# Patient Record
Sex: FEMALE | Race: OTHER | Employment: FULL TIME
[De-identification: names, ages, dates, MRNs, and addresses within clinical notes are randomized per-mention and may not be internally consistent; named-entity substitution may affect disease eponyms.]

---

## 2017-01-25 ENCOUNTER — TELEPHONE (OUTPATIENT)
Dept: OBGYN | Facility: CLINIC | Age: 32
End: 2017-01-25

## 2017-01-25 DIAGNOSIS — N92.6 MISSED PERIOD: Primary | ICD-10-CM

## 2017-02-16 ENCOUNTER — HOSPITAL ENCOUNTER (OUTPATIENT)
Age: 32
Discharge: HOME | End: 2017-02-16
Payer: COMMERCIAL

## 2017-02-16 DIAGNOSIS — N92.6 MISSED PERIOD: ICD-10-CM

## 2017-02-16 LAB — HCG QUANTITATIVE: <1 IU/L

## 2017-02-16 PROCEDURE — 84702 CHORIONIC GONADOTROPIN TEST: CPT

## 2017-02-16 PROCEDURE — 36415 COLL VENOUS BLD VENIPUNCTURE: CPT

## 2018-02-16 ENCOUNTER — OFFICE VISIT (OUTPATIENT)
Dept: OBGYN CLINIC | Age: 33
End: 2018-02-16
Payer: COMMERCIAL

## 2018-02-16 ENCOUNTER — HOSPITAL ENCOUNTER (OUTPATIENT)
Age: 33
Setting detail: SPECIMEN
Discharge: HOME | End: 2018-02-16
Payer: COMMERCIAL

## 2018-02-16 VITALS
HEART RATE: 86 BPM | HEIGHT: 65 IN | DIASTOLIC BLOOD PRESSURE: 70 MMHG | WEIGHT: 188 LBS | SYSTOLIC BLOOD PRESSURE: 106 MMHG | BODY MASS INDEX: 31.32 KG/M2

## 2018-02-16 DIAGNOSIS — Z11.51 SPECIAL SCREENING EXAMINATION FOR HUMAN PAPILLOMAVIRUS (HPV): ICD-10-CM

## 2018-02-16 DIAGNOSIS — N89.8 VAGINAL DISCHARGE: ICD-10-CM

## 2018-02-16 DIAGNOSIS — L91.8 SKIN TAG: ICD-10-CM

## 2018-02-16 DIAGNOSIS — Z01.419 WELL WOMAN EXAM: Primary | ICD-10-CM

## 2018-02-16 LAB
DIRECT EXAM: NORMAL
Lab: NORMAL
SPECIMEN DESCRIPTION: NORMAL
SPECIMEN DESCRIPTION: NORMAL
STATUS: NORMAL

## 2018-02-16 PROCEDURE — G0145 SCR C/V CYTO,THINLAYER,RESCR: HCPCS

## 2018-02-16 PROCEDURE — 87491 CHLMYD TRACH DNA AMP PROBE: CPT

## 2018-02-16 PROCEDURE — 99395 PREV VISIT EST AGE 18-39: CPT | Performed by: NURSE PRACTITIONER

## 2018-02-16 PROCEDURE — 87510 GARDNER VAG DNA DIR PROBE: CPT

## 2018-02-16 PROCEDURE — 87480 CANDIDA DNA DIR PROBE: CPT

## 2018-02-16 PROCEDURE — 87660 TRICHOMONAS VAGIN DIR PROBE: CPT

## 2018-02-16 PROCEDURE — 87591 N.GONORRHOEAE DNA AMP PROB: CPT

## 2018-02-16 PROCEDURE — 87624 HPV HI-RISK TYP POOLED RSLT: CPT

## 2018-02-16 ASSESSMENT — PATIENT HEALTH QUESTIONNAIRE - PHQ9
2. FEELING DOWN, DEPRESSED OR HOPELESS: 0
SUM OF ALL RESPONSES TO PHQ9 QUESTIONS 1 & 2: 0
SUM OF ALL RESPONSES TO PHQ QUESTIONS 1-9: 0
1. LITTLE INTEREST OR PLEASURE IN DOING THINGS: 0

## 2018-02-16 NOTE — PROGRESS NOTES
cancer- 45s? - unsure of what age)  Given information on Myrisk testing     If YES see scanned worksheet. Preventative Health Testing:    Health Maintenance:  Health Maintenance Due   Topic Date Due    DTaP/Tdap/Td vaccine (1 - Tdap) 08/23/2005    Cervical cancer screen  01/05/2017    Flu vaccine (1) 09/01/2017       Date of Last Pap Smear: 1/5/2016 ASCUS  Abnormal Pap Smear History: yes  Colposcopy History: colp 5/2016  Date of Last Mammogram: NA  Date of Last Colonoscopy:   Date of Last Bone Density:      ________________________________________________________________________        REVIEW OF SYSTEMS:    yes   A minimum of an eleven point review of systems was completed. Review Of Systems (11 point):  Constitutional: No fever, chills or malaise; No weight change or fatigue  Head and Eyes: No vision, Headache, Dizziness or trauma in last 12 months  ENT ROS: No hearing, Tinnitis, sinus or taste problems  Hematological and Lymphatic ROS:No Lymphoma, Von Willebrand's, Hemophillia or Bleeding History  Psych ROS: No Depression, Homicidal thoughts,suicidal thoughts, or anxiety  Breast ROS: No prior breast abnormalities or lumps  Respiratory ROS: No SOB, Pneumoniae,Cough, or Pulmonary Embolism History  Cardiovascular ROS: No Chest Pain with Exertion, Palpitations, Syncope, Edema, Arrhythmia  Gastrointestinal ROS: No Indigestion, Heartburn, Nausea, vomiting, Diarrhea, Constipation,or Bowel Changes; No Bloody Stools or melena  Genito-Urinary ROS: No Dysuria, Hematuria or Nocturia.  No Urinary Incontinence +Vaginal Discharge  Musculoskeletal ROS: No Arthralgia, Arthritis,Gout,Osteoporosis or Rheumatism  Neurological ROS: No CVA, Migraines, Epilepsy, Seizure Hx, or Limb Weakness  Dermatological ROS: No Rash, Itching, Hives, Mole Changes or Cancer PHYSICAL Exam:     Constitutional:  Vitals:    02/16/18 1055   BP: 106/70   Site: Right Arm   Position: Sitting   Cuff Size: Medium Adult   Pulse: 86   Weight: 188 lb (85.3 kg)   Height: 5' 5\" (1.651 m)         General Appearance: This  is a well Developed, well Nourished, well groomed female. Her BMI was reviewed. Nutritional decision making was discussed. Skin:  There was a Normal Inspection of the skin without rashes or lesions. There were no rashes. (Papular, Maculopapular, Hives, Pustular, Macular)     There were no lesions (Ulcers, Erythema, Abn. Appearing Nevi)            Lymphatic:  No Lymph Nodes were Palpable in the neck , axilla or groin.  0 # Of Lymph Nodes; Location ; Character [Normal]  [Shotty] [Tender] [Enlarged]     Neck and EENT:  The neck was supple. There were no masses   The thyroid was not enlarged and had no masses. Perrla, EOMI B/L, TMI B/L No Abnormalities. Throat inspected-No exudates or Masses, Nares Patent No Masses        Respiratory: The lungs were auscultated and found to be clear. There were no rales, rhonchi or wheezes. There was a good respiratory effort. Cardiovascular: The heart was in a regular rate and rhythm. . No S3 or S4. There was no murmur appreciated. Location, grade, and radiation are not applicable. Extremities: The patients extremities were without calf tenderness, edema, or varicosities. There was full range of motion in all four extremities. Pulses in all four extremities were appreciated and are 2/4. Abdomen: The abdomen was soft and non-tender. There were good bowel sounds in all quadrants and there was no guarding, rebound or rigidity. On evaluation there was no evidence of hepatosplenomegaly and there was no costal vertebral adeline tenderness bilaterally. No hernias were appreciated. Abdominal Scars: cholecystectomy scar    Psych:   The patient had a normal Orientation to: Time, begin every year at 37 yo if no abnormalities are found and no family     History. 3. Bone density studies every 2-3 years. Begin at 71 yo. If no fracture history or osteoporosis family history. (significant). 4. Colonoscopy begin at 47 yo. Repeat every ten years if negative and no family history. 5. Calcium of 4074-3448 mg/day in split dosing  6. Vitamin D 400-800 IU/day  7. All other preventative health recommendations will be managed by the patients Primary care physician. PLAN:  Return in about 4 weeks (around 3/16/2018) for DR Montoya/ skin tag removal.   Pap smear and vaginal cultures collected. Call for results. Repeat Annual every 1 year  Cervical Cytology Evaluation begins at 24years old. If Negative Cytology, Follow-up screening per current guidelines. Mammograms every 1 year. If 37 yo and last mammogram was negative. Calcium and Vitamin D dosing reviewed. Colonoscopy screening reviewed as well as onset for bone density testing. Birth control and barrier recommendations discussed. STD counseling and prevention reviewed. Gardisil counseling completed for all patients 7-35 yo. Routine health maintenance per patients PCP. Orders Placed This Encounter   Procedures    VAGINITIS DNA PROBE    C. Trachomatis / N. Gonorrhoeae, DNA     Standing Status:   Future     Standing Expiration Date:   2/17/2019    PAP SMEAR     Patient History:    No LMP recorded. Patient has had an injection. OBGYN Status: Injection  Past Surgical History:  No date: CHOLECYSTECTOMY    Problem List       Edg Problems Affecting Cytology    HPV in female     Smoking status: Never Smoker                                                              Smokeless tobacco: Never Used                           Standing Status:   Future     Standing Expiration Date:   2/16/2019     Order Specific Question:   Collection Type     Answer: Thin Prep     Order Specific Question:   Prior Abnormal Pap Test     Answer:    No

## 2018-02-19 LAB
C TRACH DNA GENITAL QL NAA+PROBE: NEGATIVE
HPV SAMPLE: ABNORMAL
HPV SOURCE: ABNORMAL
HPV, GENOTYPE 16: NOT DETECTED
HPV, GENOTYPE 18: DETECTED
HPV, HIGH RISK OTHER: DETECTED
HPV, INTERPRETATION: ABNORMAL
N. GONORRHOEAE DNA: NEGATIVE

## 2018-02-23 LAB — CYTOLOGY REPORT: NORMAL

## 2018-02-28 ENCOUNTER — TELEPHONE (OUTPATIENT)
Dept: OBGYN CLINIC | Age: 33
End: 2018-02-28

## 2018-02-28 RX ORDER — FLUCONAZOLE 150 MG/1
150 TABLET ORAL ONCE
Qty: 1 TABLET | Refills: 0 | Status: SHIPPED | OUTPATIENT
Start: 2018-02-28 | End: 2018-02-28

## 2018-04-16 ENCOUNTER — TELEPHONE (OUTPATIENT)
Dept: OBGYN CLINIC | Age: 33
End: 2018-04-16

## 2018-04-16 DIAGNOSIS — N92.6 MISSED MENSES: Primary | ICD-10-CM

## 2018-07-09 ENCOUNTER — OFFICE VISIT (OUTPATIENT)
Dept: PODIATRY | Age: 33
End: 2018-07-09
Payer: COMMERCIAL

## 2018-07-09 VITALS — HEIGHT: 65 IN | WEIGHT: 193 LBS | BODY MASS INDEX: 32.15 KG/M2

## 2018-07-09 DIAGNOSIS — M72.2 PLANTAR FASCIAL FIBROMATOSIS: Primary | ICD-10-CM

## 2018-07-09 DIAGNOSIS — M79.671 PAIN IN RIGHT FOOT: ICD-10-CM

## 2018-07-09 PROCEDURE — 99203 OFFICE O/P NEW LOW 30 MIN: CPT | Performed by: PODIATRIST

## 2018-07-09 NOTE — PROGRESS NOTES
Bilateral    Neurological: Sensation intact to light touch to level of digits, Bilateral.  Protective sensation intact 10/10 sites via 5.07/10g [place]-Vasyl Monofilament, Bilateral.  negative Tinel's, Bilateral.  negative Valleix sign, Bilateral.      Integument: Warm, dry, supple, Bilateral.  Open lesion absent, Bilateral.  Interdigital maceration absent to web spaces 1-4, Bilateral.  Nails are normal in length, thickness and color 1-5 bilateral.  Fissures absent, Bilateral.         Asessment: Patient is a 33 y.o. female with:   Diagnosis Orders   1. Plantar fascial fibromatosis  MRI Ankle Right WO Contrast   2. Pain in right foot  MRI Ankle Right WO Contrast       Plan: Patient examined and evaluated. Current condition and treatment options discussed in detail. Advised pt to her condtion. since we have tried NSAID, immobilization,  RICE therapy physical therapy and the symptoms have not improved we will need to order an MRI of the affected limb to assess the area as she may have a tear in the ligament. .  Verbal and written instructions given to patient. Orders:   Orders Placed This Encounter   Procedures    MRI Ankle Right WO Contrast     Standing Status:   Future     Standing Expiration Date:   7/10/2019      Contact office with any questions/problems/concerns.   RTC in 1week(s) after MRI.    7/9/2018    Electronically signed by Kannan Freedman DPM on 7/9/2018 at 4:26 PM  7/9/2018

## 2018-07-14 ENCOUNTER — HOSPITAL ENCOUNTER (OUTPATIENT)
Dept: MRI IMAGING | Age: 33
Discharge: HOME | End: 2018-07-16
Payer: COMMERCIAL

## 2018-07-14 DIAGNOSIS — M79.671 PAIN IN RIGHT FOOT: ICD-10-CM

## 2018-07-14 DIAGNOSIS — M72.2 PLANTAR FASCIAL FIBROMATOSIS: ICD-10-CM

## 2018-07-14 PROCEDURE — 73721 MRI JNT OF LWR EXTRE W/O DYE: CPT

## 2018-07-16 ENCOUNTER — OFFICE VISIT (OUTPATIENT)
Dept: PODIATRY | Age: 33
End: 2018-07-16
Payer: COMMERCIAL

## 2018-07-16 VITALS — HEIGHT: 65 IN | WEIGHT: 193 LBS | BODY MASS INDEX: 32.15 KG/M2

## 2018-07-16 DIAGNOSIS — M79.671 PAIN IN BOTH FEET: ICD-10-CM

## 2018-07-16 DIAGNOSIS — M72.2 PLANTAR FASCIAL FIBROMATOSIS: Primary | ICD-10-CM

## 2018-07-16 DIAGNOSIS — M79.672 PAIN IN BOTH FEET: ICD-10-CM

## 2018-07-16 PROBLEM — F10.10 ALCOHOL ABUSE: Status: ACTIVE | Noted: 2017-02-08

## 2018-07-16 PROBLEM — K85.90 ACUTE PANCREATITIS: Status: ACTIVE | Noted: 2017-08-22

## 2018-07-16 PROBLEM — N91.2 AMENORRHEA: Status: ACTIVE | Noted: 2017-03-22

## 2018-07-16 PROBLEM — K80.10 CHOLELITHIASIS AND CHOLECYSTITIS WITHOUT OBSTRUCTION: Status: ACTIVE | Noted: 2017-08-22

## 2018-07-16 PROBLEM — B35.4 TINEA CORPORIS: Status: ACTIVE | Noted: 2017-02-08

## 2018-07-16 PROBLEM — R51.9 HEADACHE: Status: ACTIVE | Noted: 2018-07-16

## 2018-07-16 PROBLEM — J01.90 ACUTE SINUSITIS: Status: ACTIVE | Noted: 2018-07-16

## 2018-07-16 PROBLEM — N92.6 IRREGULAR PERIODS: Status: ACTIVE | Noted: 2018-07-16

## 2018-07-16 PROBLEM — F41.8 MIXED ANXIETY AND DEPRESSIVE DISORDER: Status: ACTIVE | Noted: 2018-07-16

## 2018-07-16 PROCEDURE — 99213 OFFICE O/P EST LOW 20 MIN: CPT | Performed by: PODIATRIST

## 2018-07-16 RX ORDER — CITALOPRAM 10 MG/1
TABLET ORAL
COMMUNITY
End: 2018-08-10

## 2018-07-16 RX ORDER — KETOROLAC TROMETHAMINE 30 MG/ML
INJECTION, SOLUTION INTRAMUSCULAR; INTRAVENOUS
COMMUNITY
End: 2018-08-10

## 2018-07-16 RX ORDER — NAPROXEN 500 MG/1
TABLET ORAL
COMMUNITY
End: 2018-08-10

## 2018-07-16 RX ORDER — METRONIDAZOLE 500 MG/1
TABLET ORAL
COMMUNITY
End: 2018-08-10 | Stop reason: ALTCHOICE

## 2018-07-16 NOTE — LETTER
[address]  [address]  Phone: [phone]    Iam Nelson        July 16, 7216     Patient: Conner Aviles   YOB: 1985   Date of Visit: 7/16/2018       To Whom it May Concern:    Conner Aviles was seen in my clinic on 7/16/2018. She may return to work on tuesday july 17 2018. Please allow patient to work a full shift with 4 hours light duty and 4 hours of regular work duty not to exceed 8 hours per shift. Please allow patient to ice her foot for 15 minutes when needed for foot pain. If you have any questions or concerns, please don't hesitate to call.     Sincerely,         Maria De Jesus Miller DPM

## 2018-08-10 ENCOUNTER — HOSPITAL ENCOUNTER (OUTPATIENT)
Dept: PREADMISSION TESTING | Age: 33
Discharge: HOME | End: 2018-08-14
Payer: COMMERCIAL

## 2018-08-10 VITALS
WEIGHT: 201.5 LBS | SYSTOLIC BLOOD PRESSURE: 116 MMHG | RESPIRATION RATE: 16 BRPM | OXYGEN SATURATION: 98 % | HEART RATE: 89 BPM | BODY MASS INDEX: 33.57 KG/M2 | DIASTOLIC BLOOD PRESSURE: 88 MMHG | HEIGHT: 65 IN

## 2018-08-10 LAB
HCT VFR BLD CALC: 39.8 % (ref 36–46)
HEMOGLOBIN: 12.9 G/DL (ref 12–16)
MCH RBC QN AUTO: 26.5 PG (ref 26–34)
MCHC RBC AUTO-ENTMCNC: 32.5 G/DL (ref 31–37)
MCV RBC AUTO: 81.7 FL (ref 80–100)
NRBC AUTOMATED: ABNORMAL PER 100 WBC
PDW BLD-RTO: 13.5 % (ref 11.5–14.5)
PLATELET # BLD: 368 K/UL (ref 130–400)
PMV BLD AUTO: 9 FL (ref 6–12)
RBC # BLD: 4.88 M/UL (ref 4–5.2)
WBC # BLD: 13.5 K/UL (ref 3.5–11)

## 2018-08-10 PROCEDURE — 85027 COMPLETE CBC AUTOMATED: CPT

## 2018-08-10 PROCEDURE — 36415 COLL VENOUS BLD VENIPUNCTURE: CPT

## 2018-08-10 RX ORDER — PREDNISONE 20 MG/1
20 TABLET ORAL 2 TIMES DAILY
Status: ON HOLD | COMMUNITY
End: 2018-09-07

## 2018-08-10 RX ORDER — AMOXICILLIN AND CLAVULANATE POTASSIUM 875; 125 MG/1; MG/1
1 TABLET, FILM COATED ORAL 2 TIMES DAILY
Status: ON HOLD | COMMUNITY
End: 2018-09-07

## 2018-08-10 ASSESSMENT — PAIN DESCRIPTION - ORIENTATION: ORIENTATION: RIGHT

## 2018-08-10 ASSESSMENT — PAIN SCALES - GENERAL: PAINLEVEL_OUTOF10: 7

## 2018-08-10 ASSESSMENT — PAIN DESCRIPTION - DESCRIPTORS: DESCRIPTORS: SHARP

## 2018-08-10 ASSESSMENT — PAIN DESCRIPTION - PAIN TYPE: TYPE: ACUTE PAIN

## 2018-08-10 ASSESSMENT — PAIN DESCRIPTION - LOCATION: LOCATION: FOOT

## 2018-08-10 NOTE — H&P
History and Physical Service   [facility]    HISTORY AND PHYSICAL EXAMINATION            Date of Evaluation: 8/10/2018  Patient name:  Abiel Arthur  MRN:   [id number]  YOB: 1985  PCP:    Alia Michaels M.D., MD    History Obtained From:     Patient    History of Present Illness: This is Abiel Arthur a 33 y.o. female who presents for a pre-admission testing appointment for an upcoming right foot plantar fasciotomy and PRP injection by Dr. Randi Puente scheduled on 09- at 0900 due to right plantar fasciitis. The  patient's chief complaint is intermittent, 5-10/10 right foot pain which has progressively worsened over the past couple months. Right foot pain is aggravated by walking; and is minimally relieved with ice, elevation, and Naproxen. Pt states she has occasional right hallux numbness and occasional right foot swelling. Denies right foot tingling and weakness. Prior treatment includes a foot boot which is worn at night and right foot steroid injections x 3 by Dr. Ariadne West from podiatry. Denies recent falls and injury. Patient presents for surgical correction. Pt started taking Augmentin on 08- due to tonsillitis. Augmentin was prescribed for 10 days by Dr. Ritchie Grimaldo per pt. Since tonsillitis symptoms and sore throat had not improved by 08-, Dr. Ritchie Grimaldo started the pt on Prednisone 20 mg twice daily for 7 days. Pt denies current sore throat and fever. Pt states she has sinus congestion, occasional snoring, halitosis, and occasional dysphagia. Pt is taking Claritin for sinus congestion. Pt has an appointment with ENT on 08-.     Past Medical History:     Past Medical History:   Diagnosis Date    Alcohol abuse 2/8/2017    Atypical squamous cell changes of undetermined significance (ASCUS) on cervical cytology with positive high risk human papilloma virus (HPV) 1/5/16    Chlamydia 10/9/12    Depressive disorder 06/06/2013    is doing well now, no longer on medication    History of anxiety     HPV in female 2016    Seasonal allergies     Strep throat     Tonsillitis 2018        Past Surgical History:     Past Surgical History:   Procedure Laterality Date    CHOLECYSTECTOMY          Medications Prior to Admission:     Prior to Admission medications    Medication Sig Start Date End Date Taking? Authorizing Provider   NAPROXEN PO Take 200 mg by mouth 2 times daily as needed   Yes Historical Provider, MD   amoxicillin-clavulanate (AUGMENTIN) 875-125 MG per tablet Take 1 tablet by mouth 2 times daily   Yes Historical Provider, MD   predniSONE (DELTASONE) 20 MG tablet Take 20 mg by mouth 2 times daily   Yes Historical Provider, MD   loratadine-pseudoephedrine (CLARITIN-D 12 HOUR) 5-120 MG per extended release tablet Claritin-D 12 Hour 5 mg-120 mg tablet,extended release   Take 1 tablet every 12 hours by oral route. Yes Historical Provider, MD        Allergies:     No known allergies    Social History:     Tobacco:    reports that she has never smoked. She has never used smokeless tobacco.  Alcohol:      reports that she drinks alcohol. Drug Use:  reports that she does not use drugs. Functional Capacity:   1) Pt is able to walk 2 blocks on level ground without stopping due to right foot pain. 2) Pt is able to climb 1 flight of stairs or more without stopping. 3) Pt is able to walk up a hill 1-2 blocks.     Family History:     Family History   Problem Relation Age of Onset    Other Paternal Grandfather         sepsis    Diabetes Paternal Grandfather     Kidney Disease Paternal Grandfather         had transplant that caused sepsis    Cancer Paternal Grandmother         unsure of kind    Hypertension Paternal Grandmother     Hypertension Father     No Known Problems Mother     Breast Cancer Neg Hx     Colon Cancer Neg Hx     Eclampsia Neg Hx     Ovarian Cancer Neg Hx      Labor Neg Hx     Spont Abortions Neg Hx     Stroke Neg Hx  Heart Attack Neg Hx        Review of Systems:     Positive and Negative as described in HPI. CONSTITUTIONAL: Negative for fevers, chills, sweats, fatigue, and weight loss. HEENT: Sinus congestion. Occasional dysphagia. Seasonal allergies. Snoring. Halitosis. Negative for glasses, hearing changes, runny nose, and throat pain. RESPIRATORY:  Denies asthma, COPD, and sleep apnea. Negative for shortness of breath, cough, congestion, and wheezing. CARDIOVASCULAR:  Negative for chest pain, blood clot, irregular heart beat, and palpitations. GASTROINTESTINAL:  Negative for reflux, nausea, vomiting, diarrhea, constipation, change in bowel habits, and abdominal pain. GENITOURINARY: Negative for difficulty of urination, burning with urination, and frequency. INTEGUMENT:  Negative for rash, skin lesions, and easy bruising. HEMATOLOGIC/LYMPHATIC:  Negative for swelling/edema. ALLERGIC/IMMUNOLOGIC:  Negative for urticaria and itching. ENDOCRINE:  Negative for diabetes, increase in drinking, increase in urination, and heat or cold intolerance. MUSCULOSKELETAL: Right foot pain and occasional swelling. Negative muscle aches. NEUROLOGICAL: Right hallux numbness. Negative for headaches, dizziness, lightheadedness, and tingling extremities. BEHAVIOR/PSYCH: Depression. Negative for anxiety. Physical Exam:   /88   Pulse 89   Resp 16   Ht 5' 5\" (1.651 m)   Wt 201 lb 8 oz (91.4 kg)   LMP 07/14/2018   SpO2 98%   BMI 33.53 kg/m²   Patient's last menstrual period was 07/14/2018. [id number]  No results for input(s): POCGLU in the last 72 hours. General Appearance:  Alert, well appearing, and in no acute distress. Obese. Mental status:  Oriented to person, place, and time. Head:  Normocephalic and atraumatic. Eye:  No icterus, redness, pupils equal and reactive, extraocular eye movements intact, and conjunctiva clear. Ear:  Hearing grossly intact. Nose:  No drainage noted.   Mouth:  Airway is calcaneofibular and posterior talofibular ligaments are intact. DELTOID LIGAMENT COMPLEX: The deep fibers of the deltoid ligament are intact. SINUS TARSI AND SPRING LIGAMENT: The sinus tarsi fat is preserved. MEDIAL TENDONS: The tibialis posterior, flexor digitorum longus and flexor hallucis longus tendons are intact. There is minimal thickening of the distal tibialis posterior tendon. LATERAL TENDONS: Peroneal tendons are located and intact. There is a trace amount of peroneal tendon sheath fluid. ANTERIOR TENDONS: The anterior tendons are intact. ACHILLES TENDON: The Achilles tendon is intact. PLANTAR FASCIA: There is intense edema associated with the plantar fascial origins. This is most pronounced at the central and lateral cord origins. There is a small plantar calcaneal heel spur with mild edema. There is linear fluid signal intensity within the central cord origin consistent with small interstitial tearing. There is no complete tear or distraction. TARSAL TUNNEL: There are no obstructing lesions in the location of the tarsal tunnel. BONE MARROW: There is articular surface irregularity about the medial naviculocuneiform articulation with associated edema and cystic change. There is intense edema within the mid to distal aspect of the central cuneiform. There is no diffuse marrow replacing process. JOINT SPACES: The Lisfranc ligament is intact. Midfoot alignment is within normal limits. There is no osteochondral lesion. 1. Moderate to severe acute plantar fasciitis most pronounced involving the central and lateral cords. Small linear interstitial tearing at the central cord origin. 2. Articular surface irregularity and subchondral change involving the medial naviculocuneiform articulation. 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 3. Minimal peroneal tenosynovitis. Diagnosis:      1.  Right plantar fasciitis    Plans: 1.  Right foot plantar fasciotomy and PRP injection      Gustavo Del Rosario, APRN - CNP  8/10/2018  3:22 PM

## 2018-08-10 NOTE — PRE-PROCEDURE INSTRUCTIONS
[facility] ON Friday, 9/7/2018 at 7:30 AM    Once you enter the hospital lobby, take the elevators to the second floor. Check-In is at the surgery registration desk. If you have been given a blood band, you must bring it with you the day of surgery. Continue to take your home medications as you normally do up to and including the night before surgery with the exception of any blood thinning medications. Please stop any blood thinning medications as directed by your surgeon or prescribing physician. Failure to stop certain medications may interfere with your scheduled surgery. These may include:  Aspirin, Warfarin (Coumadin), Clopidogrel (Plavix), Ibuprofen (Motrin, Advil), Naproxen (Aleve), Meloxicam (Mobic), Celecoxib (Celebrex), Eliquis, Pradaxa, Xarelto, Effient, Fish Oil, Herbal supplements. Naproxen    If you are diabetic, do not take any of your diabetic medications by mouth the morning of surgery. If you are taking insulin contact the doctor that manages your diabetes for instructions about any changes to your insulin dosages the day before surgery. Do not inject insulin or other injectable diabetic medications the morning of surgery unless otherwise instructed by the doctor who manages your diabetes. Please take the following medication(s) the day of surgery with a small sip of water:  none    Please use your inhalers at home the day of surgery. PREPARING FOR YOUR SURGERY:     Before surgery, you can play an important role in your own health. Because skin is not sterile, we need to be sure that your skin is as free of germs as possible before surgery by carefully washing before surgery. Preparing or prepping skin before surgery can reduce the risk of a surgical site infection.   Do not shave the area of your body where your surgery will be performed unless you received specific permission from your physician.     You will need to shower at home the night before to surgery.  Brush your teeth but do not swallow water.  Bring your eyeglasses and case with you. No contacts are to be worn the day of surgery. You also may bring your hearing aids.  If you are on C-PAP or Bi-PAP at home and plan on staying in the hospital overnight for your surgery please bring the machine with you. · Do not wear any jewelry or body piercings day of surgery. Also, NO lotion, perfume or deodorant to be used the day of surgery. No nail polish on the operative extremity (arm/leg surgeries)    · If you are staying overnight with us, please bring a small bag of necessary personal items.  Please wear loose, comfortable clothing. If you are potentially going to have a cast or brace bring clothing that will fit over them.  In case of illness - If you have cold or flu like symptoms (high fever, runny nose, sore throat, cough, etc.) rash, nausea, vomiting, loose stools, and/or recent contact with someone who has a contagious disease (chicken pox, measles, etc.) Please call your doctor before coming to the hospital.     If your child is having surgery please make arrangements for any other children to be cared for at home on the day of surgery. Other children are not permitted in recovery room and we want you to be able to spend time with the patient. If other arrangements are not available then we suggest that you have a second adult to stay in the waiting room. Day of Surgery/Procedure:    As a patient at [facility] you can expect quality medical and nursing care that is centered on your individual needs. Our goal is to make your surgical experience as comfortable as possible    . Transportation After Your Surgery/Procedure: You will need a friend or family member to drive you home after your procedure.   Your  must be 25years of age or

## 2018-09-04 NOTE — PROGRESS NOTES
Called patient regarding her recent tonsillitis and scheduled appointment with ENT 8/24/2018. Pt states she had to reschedule her appointment and has not gone yet. States she finished her antibiotics and steroids and she hasn't had any problems since.

## 2018-09-06 ENCOUNTER — ANESTHESIA EVENT (OUTPATIENT)
Dept: OPERATING ROOM | Age: 33
End: 2018-09-06
Payer: COMMERCIAL

## 2018-09-07 ENCOUNTER — HOSPITAL ENCOUNTER (OUTPATIENT)
Age: 33
Setting detail: OUTPATIENT SURGERY
Discharge: HOME | End: 2018-09-07
Attending: PODIATRIST | Admitting: PODIATRIST
Payer: COMMERCIAL

## 2018-09-07 ENCOUNTER — ANESTHESIA (OUTPATIENT)
Dept: OPERATING ROOM | Age: 33
End: 2018-09-07
Payer: COMMERCIAL

## 2018-09-07 VITALS
HEIGHT: 65 IN | TEMPERATURE: 96.8 F | SYSTOLIC BLOOD PRESSURE: 126 MMHG | OXYGEN SATURATION: 100 % | HEART RATE: 67 BPM | WEIGHT: 201.5 LBS | DIASTOLIC BLOOD PRESSURE: 77 MMHG | RESPIRATION RATE: 19 BRPM | BODY MASS INDEX: 33.57 KG/M2

## 2018-09-07 VITALS — OXYGEN SATURATION: 100 % | SYSTOLIC BLOOD PRESSURE: 117 MMHG | DIASTOLIC BLOOD PRESSURE: 73 MMHG | TEMPERATURE: 97 F

## 2018-09-07 DIAGNOSIS — Z98.890 STATUS POST RIGHT FOOT SURGERY: Primary | ICD-10-CM

## 2018-09-07 DIAGNOSIS — G89.18 ACUTE POST-OPERATIVE PAIN: ICD-10-CM

## 2018-09-07 LAB — HCG, PREGNANCY URINE (POC): NEGATIVE

## 2018-09-07 PROCEDURE — 6360000002 HC RX W HCPCS: Performed by: ANESTHESIOLOGY

## 2018-09-07 PROCEDURE — 7100000011 HC PHASE II RECOVERY - ADDTL 15 MIN: Performed by: PODIATRIST

## 2018-09-07 PROCEDURE — 84703 CHORIONIC GONADOTROPIN ASSAY: CPT

## 2018-09-07 PROCEDURE — 28062 REMOVAL OF FOOT FASCIA: CPT | Performed by: PODIATRIST

## 2018-09-07 PROCEDURE — 6360000002 HC RX W HCPCS: Performed by: PODIATRIST

## 2018-09-07 PROCEDURE — 3700000000 HC ANESTHESIA ATTENDED CARE: Performed by: PODIATRIST

## 2018-09-07 PROCEDURE — 6370000000 HC RX 637 (ALT 250 FOR IP): Performed by: ANESTHESIOLOGY

## 2018-09-07 PROCEDURE — C9290 INJ, BUPIVACAINE LIPOSOME: HCPCS | Performed by: PODIATRIST

## 2018-09-07 PROCEDURE — 7100000000 HC PACU RECOVERY - FIRST 15 MIN: Performed by: PODIATRIST

## 2018-09-07 PROCEDURE — 2580000003 HC RX 258: Performed by: PODIATRIST

## 2018-09-07 PROCEDURE — 6360000002 HC RX W HCPCS: Performed by: NURSE ANESTHETIST, CERTIFIED REGISTERED

## 2018-09-07 PROCEDURE — 2580000003 HC RX 258: Performed by: ANESTHESIOLOGY

## 2018-09-07 PROCEDURE — 2709999900 HC NON-CHARGEABLE SUPPLY: Performed by: PODIATRIST

## 2018-09-07 PROCEDURE — 3600000012 HC SURGERY LEVEL 2 ADDTL 15MIN: Performed by: PODIATRIST

## 2018-09-07 PROCEDURE — 7100000010 HC PHASE II RECOVERY - FIRST 15 MIN: Performed by: PODIATRIST

## 2018-09-07 PROCEDURE — 2500000003 HC RX 250 WO HCPCS: Performed by: NURSE ANESTHETIST, CERTIFIED REGISTERED

## 2018-09-07 PROCEDURE — 3700000001 HC ADD 15 MINUTES (ANESTHESIA): Performed by: PODIATRIST

## 2018-09-07 PROCEDURE — 2580000003 HC RX 258: Performed by: NURSE ANESTHETIST, CERTIFIED REGISTERED

## 2018-09-07 PROCEDURE — 7100000001 HC PACU RECOVERY - ADDTL 15 MIN: Performed by: PODIATRIST

## 2018-09-07 PROCEDURE — 3600000002 HC SURGERY LEVEL 2 BASE: Performed by: PODIATRIST

## 2018-09-07 RX ORDER — MIDAZOLAM HYDROCHLORIDE 1 MG/ML
INJECTION INTRAMUSCULAR; INTRAVENOUS PRN
Status: DISCONTINUED | OUTPATIENT
Start: 2018-09-07 | End: 2018-09-07 | Stop reason: SDUPTHER

## 2018-09-07 RX ORDER — FENTANYL CITRATE 50 UG/ML
INJECTION, SOLUTION INTRAMUSCULAR; INTRAVENOUS PRN
Status: DISCONTINUED | OUTPATIENT
Start: 2018-09-07 | End: 2018-09-07 | Stop reason: SDUPTHER

## 2018-09-07 RX ORDER — SODIUM CHLORIDE, SODIUM LACTATE, POTASSIUM CHLORIDE, CALCIUM CHLORIDE 600; 310; 30; 20 MG/100ML; MG/100ML; MG/100ML; MG/100ML
INJECTION, SOLUTION INTRAVENOUS CONTINUOUS PRN
Status: DISCONTINUED | OUTPATIENT
Start: 2018-09-07 | End: 2018-09-07 | Stop reason: SDUPTHER

## 2018-09-07 RX ORDER — ONDANSETRON 2 MG/ML
INJECTION INTRAMUSCULAR; INTRAVENOUS PRN
Status: DISCONTINUED | OUTPATIENT
Start: 2018-09-07 | End: 2018-09-07 | Stop reason: SDUPTHER

## 2018-09-07 RX ORDER — CEPHALEXIN 500 MG/1
500 CAPSULE ORAL 3 TIMES DAILY
Qty: 21 CAPSULE | Refills: 0 | Status: SHIPPED | OUTPATIENT
Start: 2018-09-07 | End: 2018-09-14

## 2018-09-07 RX ORDER — SODIUM CHLORIDE, SODIUM LACTATE, POTASSIUM CHLORIDE, CALCIUM CHLORIDE 600; 310; 30; 20 MG/100ML; MG/100ML; MG/100ML; MG/100ML
INJECTION, SOLUTION INTRAVENOUS CONTINUOUS
Status: DISCONTINUED | OUTPATIENT
Start: 2018-09-08 | End: 2018-09-07 | Stop reason: HOSPADM

## 2018-09-07 RX ORDER — LABETALOL HYDROCHLORIDE 5 MG/ML
5 INJECTION, SOLUTION INTRAVENOUS EVERY 10 MIN PRN
Status: DISCONTINUED | OUTPATIENT
Start: 2018-09-07 | End: 2018-09-07 | Stop reason: HOSPADM

## 2018-09-07 RX ORDER — HYDROMORPHONE HCL 110MG/55ML
0.5 PATIENT CONTROLLED ANALGESIA SYRINGE INTRAVENOUS EVERY 5 MIN PRN
Status: DISCONTINUED | OUTPATIENT
Start: 2018-09-07 | End: 2018-09-07 | Stop reason: HOSPADM

## 2018-09-07 RX ORDER — PROMETHAZINE HYDROCHLORIDE 25 MG/ML
6.25 INJECTION, SOLUTION INTRAMUSCULAR; INTRAVENOUS
Status: DISCONTINUED | OUTPATIENT
Start: 2018-09-07 | End: 2018-09-07 | Stop reason: HOSPADM

## 2018-09-07 RX ORDER — LIDOCAINE HYDROCHLORIDE 10 MG/ML
1 INJECTION, SOLUTION EPIDURAL; INFILTRATION; INTRACAUDAL; PERINEURAL
Status: DISCONTINUED | OUTPATIENT
Start: 2018-09-07 | End: 2018-09-07 | Stop reason: HOSPADM

## 2018-09-07 RX ORDER — ONDANSETRON 2 MG/ML
4 INJECTION INTRAMUSCULAR; INTRAVENOUS
Status: DISCONTINUED | OUTPATIENT
Start: 2018-09-07 | End: 2018-09-07 | Stop reason: HOSPADM

## 2018-09-07 RX ORDER — SODIUM CHLORIDE 9 MG/ML
INJECTION, SOLUTION INTRAVENOUS CONTINUOUS
Status: DISCONTINUED | OUTPATIENT
Start: 2018-09-08 | End: 2018-09-07

## 2018-09-07 RX ORDER — KETOROLAC TROMETHAMINE 30 MG/ML
INJECTION, SOLUTION INTRAMUSCULAR; INTRAVENOUS PRN
Status: DISCONTINUED | OUTPATIENT
Start: 2018-09-07 | End: 2018-09-07 | Stop reason: SDUPTHER

## 2018-09-07 RX ORDER — HYDRALAZINE HYDROCHLORIDE 20 MG/ML
5 INJECTION INTRAMUSCULAR; INTRAVENOUS EVERY 10 MIN PRN
Status: DISCONTINUED | OUTPATIENT
Start: 2018-09-07 | End: 2018-09-07 | Stop reason: HOSPADM

## 2018-09-07 RX ORDER — HYDROCODONE BITARTRATE AND ACETAMINOPHEN 5; 325 MG/1; MG/1
1 TABLET ORAL
Status: COMPLETED | OUTPATIENT
Start: 2018-09-07 | End: 2018-09-07

## 2018-09-07 RX ORDER — HYDROCODONE BITARTRATE AND ACETAMINOPHEN 5; 325 MG/1; MG/1
1 TABLET ORAL EVERY 6 HOURS PRN
Qty: 28 TABLET | Refills: 0 | Status: SHIPPED | OUTPATIENT
Start: 2018-09-07 | End: 2018-09-14

## 2018-09-07 RX ORDER — PROPOFOL 10 MG/ML
INJECTION, EMULSION INTRAVENOUS PRN
Status: DISCONTINUED | OUTPATIENT
Start: 2018-09-07 | End: 2018-09-07 | Stop reason: SDUPTHER

## 2018-09-07 RX ORDER — SODIUM CHLORIDE 0.9 % (FLUSH) 0.9 %
10 SYRINGE (ML) INJECTION PRN
Status: DISCONTINUED | OUTPATIENT
Start: 2018-09-07 | End: 2018-09-07 | Stop reason: HOSPADM

## 2018-09-07 RX ORDER — SODIUM CHLORIDE 0.9 % (FLUSH) 0.9 %
10 SYRINGE (ML) INJECTION EVERY 12 HOURS SCHEDULED
Status: DISCONTINUED | OUTPATIENT
Start: 2018-09-07 | End: 2018-09-07 | Stop reason: HOSPADM

## 2018-09-07 RX ORDER — LIDOCAINE HYDROCHLORIDE 20 MG/ML
INJECTION, SOLUTION INFILTRATION; PERINEURAL PRN
Status: DISCONTINUED | OUTPATIENT
Start: 2018-09-07 | End: 2018-09-07 | Stop reason: SDUPTHER

## 2018-09-07 RX ORDER — FENTANYL CITRATE 50 UG/ML
25 INJECTION, SOLUTION INTRAMUSCULAR; INTRAVENOUS EVERY 5 MIN PRN
Status: DISCONTINUED | OUTPATIENT
Start: 2018-09-07 | End: 2018-09-07 | Stop reason: HOSPADM

## 2018-09-07 RX ORDER — DEXAMETHASONE SODIUM PHOSPHATE 10 MG/ML
INJECTION INTRAMUSCULAR; INTRAVENOUS PRN
Status: DISCONTINUED | OUTPATIENT
Start: 2018-09-07 | End: 2018-09-07 | Stop reason: SDUPTHER

## 2018-09-07 RX ORDER — LIDOCAINE HYDROCHLORIDE 5 MG/ML
INJECTION, SOLUTION INFILTRATION; PERINEURAL PRN
Status: DISCONTINUED | OUTPATIENT
Start: 2018-09-07 | End: 2018-09-07 | Stop reason: HOSPADM

## 2018-09-07 RX ADMIN — MIDAZOLAM 2 MG: 1 INJECTION INTRAMUSCULAR; INTRAVENOUS at 08:57

## 2018-09-07 RX ADMIN — DEXTROSE MONOHYDRATE 2 G: 50 INJECTION, SOLUTION INTRAVENOUS at 09:09

## 2018-09-07 RX ADMIN — HYDROCODONE BITARTRATE AND ACETAMINOPHEN 1 TABLET: 5; 325 TABLET ORAL at 11:17

## 2018-09-07 RX ADMIN — FENTANYL CITRATE 50 MCG: 50 INJECTION, SOLUTION INTRAMUSCULAR; INTRAVENOUS at 09:05

## 2018-09-07 RX ADMIN — FENTANYL CITRATE 50 MCG: 50 INJECTION, SOLUTION INTRAMUSCULAR; INTRAVENOUS at 09:01

## 2018-09-07 RX ADMIN — FENTANYL CITRATE 50 MCG: 50 INJECTION, SOLUTION INTRAMUSCULAR; INTRAVENOUS at 09:55

## 2018-09-07 RX ADMIN — SODIUM CHLORIDE, POTASSIUM CHLORIDE, SODIUM LACTATE AND CALCIUM CHLORIDE: 600; 310; 30; 20 INJECTION, SOLUTION INTRAVENOUS at 08:04

## 2018-09-07 RX ADMIN — DEXAMETHASONE SODIUM PHOSPHATE 10 MG: 10 INJECTION INTRAMUSCULAR; INTRAVENOUS at 09:07

## 2018-09-07 RX ADMIN — ONDANSETRON 4 MG: 2 INJECTION, SOLUTION INTRAMUSCULAR; INTRAVENOUS at 09:36

## 2018-09-07 RX ADMIN — FENTANYL CITRATE 25 MCG: 50 INJECTION INTRAMUSCULAR; INTRAVENOUS at 10:34

## 2018-09-07 RX ADMIN — FENTANYL CITRATE 25 MCG: 50 INJECTION INTRAMUSCULAR; INTRAVENOUS at 10:08

## 2018-09-07 RX ADMIN — FENTANYL CITRATE 25 MCG: 50 INJECTION INTRAMUSCULAR; INTRAVENOUS at 10:14

## 2018-09-07 RX ADMIN — KETOROLAC TROMETHAMINE 30 MG: 30 INJECTION, SOLUTION INTRAMUSCULAR; INTRAVENOUS at 09:36

## 2018-09-07 RX ADMIN — LIDOCAINE HYDROCHLORIDE 50 MG: 20 INJECTION, SOLUTION INFILTRATION; PERINEURAL at 09:01

## 2018-09-07 RX ADMIN — SODIUM CHLORIDE, POTASSIUM CHLORIDE, SODIUM LACTATE AND CALCIUM CHLORIDE: 600; 310; 30; 20 INJECTION, SOLUTION INTRAVENOUS at 08:57

## 2018-09-07 RX ADMIN — PROPOFOL 180 MG: 10 INJECTION, EMULSION INTRAVENOUS at 09:01

## 2018-09-07 RX ADMIN — FENTANYL CITRATE 50 MCG: 50 INJECTION, SOLUTION INTRAMUSCULAR; INTRAVENOUS at 09:25

## 2018-09-07 ASSESSMENT — PULMONARY FUNCTION TESTS
PIF_VALUE: 1
PIF_VALUE: 9
PIF_VALUE: 8
PIF_VALUE: 8
PIF_VALUE: 7
PIF_VALUE: 6
PIF_VALUE: 7
PIF_VALUE: 2
PIF_VALUE: 4
PIF_VALUE: 9
PIF_VALUE: 2
PIF_VALUE: 9
PIF_VALUE: 2
PIF_VALUE: 2
PIF_VALUE: 10
PIF_VALUE: 0
PIF_VALUE: 7
PIF_VALUE: 9
PIF_VALUE: 7
PIF_VALUE: 2
PIF_VALUE: 3
PIF_VALUE: 7
PIF_VALUE: 10
PIF_VALUE: 10
PIF_VALUE: 3
PIF_VALUE: 2
PIF_VALUE: 9
PIF_VALUE: 10
PIF_VALUE: 7
PIF_VALUE: 2
PIF_VALUE: 7
PIF_VALUE: 7
PIF_VALUE: 10
PIF_VALUE: 9
PIF_VALUE: 2
PIF_VALUE: 0
PIF_VALUE: 3
PIF_VALUE: 11
PIF_VALUE: 2
PIF_VALUE: 6
PIF_VALUE: 7
PIF_VALUE: 2
PIF_VALUE: 7
PIF_VALUE: 6
PIF_VALUE: 7
PIF_VALUE: 10
PIF_VALUE: 0
PIF_VALUE: 7
PIF_VALUE: 2
PIF_VALUE: 20
PIF_VALUE: 7
PIF_VALUE: 7
PIF_VALUE: 9
PIF_VALUE: 2
PIF_VALUE: 7
PIF_VALUE: 2
PIF_VALUE: 7
PIF_VALUE: 2
PIF_VALUE: 6
PIF_VALUE: 0
PIF_VALUE: 7
PIF_VALUE: 10

## 2018-09-07 ASSESSMENT — PAIN DESCRIPTION - LOCATION
LOCATION: FOOT

## 2018-09-07 ASSESSMENT — PAIN DESCRIPTION - PAIN TYPE
TYPE: SURGICAL PAIN

## 2018-09-07 ASSESSMENT — PAIN SCALES - GENERAL
PAINLEVEL_OUTOF10: 4
PAINLEVEL_OUTOF10: 5

## 2018-09-07 ASSESSMENT — PAIN DESCRIPTION - DESCRIPTORS
DESCRIPTORS: SORE
DESCRIPTORS: SHARP
DESCRIPTORS: SORE

## 2018-09-07 ASSESSMENT — PAIN DESCRIPTION - ORIENTATION
ORIENTATION: RIGHT

## 2018-09-07 ASSESSMENT — PAIN DESCRIPTION - PROGRESSION
CLINICAL_PROGRESSION: GRADUALLY WORSENING
CLINICAL_PROGRESSION: GRADUALLY IMPROVING

## 2018-09-07 ASSESSMENT — PAIN - FUNCTIONAL ASSESSMENT: PAIN_FUNCTIONAL_ASSESSMENT: 0-10

## 2018-09-07 NOTE — OP NOTE
PATIENT NAME: Shyann Carvalho  YOB: 1985  -  29 y.o. female  MRN: [id number]  DATE: 9/7/2018  BILLING #: [id number]     Surgeon(s):  Amara Velasquez DPM      ASSISTANTS: Negin Robles DPM, Radha Cortez DPM      PRE-OP DIAGNOSIS:   1. Chronic plantar fasciitis, Right foot      POST-OP DIAGNOSIS: Same as above.     PROCEDURE:   1. Plantar fasciectomy, Right foot  2. Injection of PRP, Right foot      ANESTHESIA: General with local      HEMOSTASIS: Pneumatic ankle left tourniquet @ 250 mmHg.     ESTIMATED BLOOD LOSS: Less than 5cc.     MATERIALS: 4-0 Monocryl, 4-0 Prolene      INJECTABLES: 8cc 1.3% Exparel, 3cc PRP      SPECIMEN: none     COMPLICATIONS: none     FINDINGS: Thickened medial and central band of plantar fascia     Indications:  Shyann Carvalho demonstrates painful chronic plantar fasciitis of the right foot, which has failed all conservative measures at this point. Surgery is indicated at this time to treat pain. All risks, benefits, and alternatives to the procedure were explained to the patient who voiced understanding. A consent was signed and placed in the chart. Procedure:  Patient was transferred to the operating room and placed on the table in the supine position. Following IV sedation, local anesthesia was obtained in the right foot using a total of 10cc of 0.5% lidocaine plain. A pneumatic ankle tourniquet was placed on the patient's right ankle. The left foot was scrubbed prepped, and draped in the usual aseptic manner. Following a timeout:   attention was directed to the medial aspect of the right calcaneus where  a 3 cm linear incision was made at the level of the plantar medial tubercle. This incision was then deepened through the subcutaneous tissues, being careful to identify and  retract all vital neural and vascular structures. All bleeders were  cauterized and ligated.   At this time, a probe was then inserted through the  medial incision and directed laterally just plantar to the plantar fascia,  which could be palpated with the probe. A plane was created between the  plantar fascia and the plantar fat pad. The plantar fascia medial and central bands were noted to be thickened. A wedge of the plantar fascia was resected with curved noland scissors. The wound was flushed with copious amounts of sterile normal saline. The subcutaneous tissue was re-approximated using 4-0 Monocryl. The PRP drawn was then injected to the surgical site. A vascular loop was inserted as a drain. The skin was re-approximated using 4-0 Prolene. The incision was dressed with adaptic and a dry sterile dressing consisting of 4x4 gauze, kerlix, and an ACE wrap. A posterior splint was applied to the RLE. Patient was transferred to the recovery room with vital signs stable and intact perfusion noted to all digits of the right foot. Following a period of post-operative monitoring the patient will be discharged home. Post-operative instructions were provided to the patient. Patient was instructed to follow-up with Dr. Mable Charles within one week.      Electronically signed by Agusto Zuniga DPM on 9/7/2018 at 3:33 PM

## 2018-09-07 NOTE — INTERVAL H&P NOTE
History and Physical Update    Pt Name: Jocelyn Parra  MRN: [id number]  [place]: 1985  Date of evaluation: 9/7/2018      [x] I have reviewed the H & P found in [facility] by Ming Galvin CNP from 08- which meets the criteria for an Interval History and Physical note. [x] I have examined  Jocelyn Parra a 29 y.o., female who is scheduled for a right foot plantar fasciotomy and PRP injection by Dr. Caroline Qauch due to right plantar fasciitis. The patient denies health changes since her appointment with Ming Galvin CNP on 08-. Pt also denies dysphagia, fever, chills, productive cough, SOB, chest pain, open sores, rashes, and wounds. Pt denies diabetes. Naproxen was last taken on 09-. Vital signs: /75   Pulse 74   Temp 98.1 °F (36.7 °C) (Oral)   Resp 18   Ht 5' 5\" (1.651 m)   Wt 201 lb 8 oz (91.4 kg)   LMP 07/14/2018   SpO2 100%   BMI 33.53 kg/m²      Allergies:  No known allergies    Past medical history, surgical history, social history, and family history were reviewed and updated in EPIC as indicated. Medications:    Prior to Admission medications    Medication Sig Start Date End Date Taking? Authorizing Provider   NAPROXEN PO Take 200 mg by mouth 2 times daily as needed    Historical Provider, MD   predniSONE (DELTASONE) 20 MG tablet Take 20 mg by mouth 2 times daily    Historical Provider, MD   loratadine-pseudoephedrine (CLARITIN-D 12 HOUR) 5-120 MG per extended release tablet Claritin-D 12 Hour 5 mg-120 mg tablet,extended release   Take 1 tablet every 12 hours by oral route. Historical Provider, MD       This is a 29 y.o. female who is pleasant, cooperative, alert and oriented x 3, in no acute distress. Obese. Heart:  HR 74. Regular rate and rhythm without murmur, gallop or rub. Lungs:  Normal respiratory effort with good air exchange, unlabored, and clear to auscultation without wheezes or rales bilaterally   Abdomen: Rounded abdomen.  Soft,

## 2018-09-07 NOTE — H&P (VIEW-ONLY)
History and Physical Service   [place] [address]    HISTORY AND PHYSICAL EXAMINATION            Date of Evaluation: 8/10/2018  Patient name:  Jocelyn Parra  MRN:   [id number]  YOB: 1985  PCP:    Jenanette Elam M.D., MD    History Obtained From:     Patient    History of Present Illness: This is Jocelyn Parra a 33 y.o. female who presents for a pre-admission testing appointment for an upcoming right foot plantar fasciotomy and PRP injection by Dr. Caroline Quach scheduled on 09- at 0900 due to right plantar fasciitis. The  patient's chief complaint is intermittent, 5-10/10 right foot pain which has progressively worsened over the past couple months. Right foot pain is aggravated by walking; and is minimally relieved with ice, elevation, and Naproxen. Pt states she has occasional right hallux numbness and occasional right foot swelling. Denies right foot tingling and weakness. Prior treatment includes a foot boot which is worn at night and right foot steroid injections x 3 by Dr. Shahbaz Dasilva from podiatry. Denies recent falls and injury. Patient presents for surgical correction. Pt started taking Augmentin on 08- due to tonsillitis. Augmentin was prescribed for 10 days by Dr. Erica Alfaro per pt. Since tonsillitis symptoms and sore throat had not improved by 08-, Dr. Erica Alfaro started the pt on Prednisone 20 mg twice daily for 7 days. Pt denies current sore throat and fever. Pt states she has sinus congestion, occasional snoring, halitosis, and occasional dysphagia. Pt is taking Claritin for sinus congestion. Pt has an appointment with ENT on 08-.     Past Medical History:     Past Medical History:   Diagnosis Date    Alcohol abuse 2/8/2017    Atypical squamous cell changes of undetermined significance (ASCUS) on cervical cytology with positive high risk human papilloma virus (HPV) 1/5/16    Chlamydia 10/9/12    Depressive disorder 06/06/2013    is doing well now, no longer on medication    History of anxiety     HPV in female 2016    Seasonal allergies     Strep throat     Tonsillitis 2018        Past Surgical History:     Past Surgical History:   Procedure Laterality Date    CHOLECYSTECTOMY          Medications Prior to Admission:     Prior to Admission medications    Medication Sig Start Date End Date Taking? Authorizing Provider   NAPROXEN PO Take 200 mg by mouth 2 times daily as needed   Yes Historical Provider, MD   amoxicillin-clavulanate (AUGMENTIN) 875-125 MG per tablet Take 1 tablet by mouth 2 times daily   Yes Historical Provider, MD   predniSONE (DELTASONE) 20 MG tablet Take 20 mg by mouth 2 times daily   Yes Historical Provider, MD   loratadine-pseudoephedrine (CLARITIN-D 12 HOUR) 5-120 MG per extended release tablet Claritin-D 12 Hour 5 mg-120 mg tablet,extended release   Take 1 tablet every 12 hours by oral route. Yes Historical Provider, MD        Allergies:     No known allergies    Social History:     Tobacco:    reports that she has never smoked. She has never used smokeless tobacco.  Alcohol:      reports that she drinks alcohol. Drug Use:  reports that she does not use drugs. Functional Capacity:   1) Pt is able to walk 2 blocks on level ground without stopping due to right foot pain. 2) Pt is able to climb 1 flight of stairs or more without stopping. 3) Pt is able to walk up a hill 1-2 blocks.     Family History:     Family History   Problem Relation Age of Onset    Other Paternal Grandfather         sepsis    Diabetes Paternal Grandfather     Kidney Disease Paternal Grandfather         had transplant that caused sepsis    Cancer Paternal Grandmother         unsure of kind    Hypertension Paternal Grandmother     Hypertension Father     No Known Problems Mother     Breast Cancer Neg Hx     Colon Cancer Neg Hx     Eclampsia Neg Hx     Ovarian Cancer Neg Hx      Labor Neg Hx     Spont Abortions Neg Hx     Stroke Neg Hx patent. Mucous membranes moist.  Neck: Enlarged tonsils. No lymphadenopathy noted. Supple and no carotid bruits noted. Lungs:  Bilateral equal air entry, clear to auscultation, no wheezing, rales or rhonchi, and normal effort. Cardiovascular:  Normal rate, regular rhythm, no murmur, gallop, and rub. Abdomen: Rounded abdomen. Soft, non-tender, non-distended, and active bowel sounds. Neurologic:  Normal speech and cranial nerves II through XII grossly intact. Strength 5+/5+ bilaterally. Skin:  No gross lesions, rashes, bruising, or bleeding on exposed skin area. Extremities: Left ankle 1+ edema. Left pedal 1+ edema. Posterior tibial pulses 2+ bilaterally. No right pedal or right ankle edema. No calf tenderness with palpation. Psych: Anxious. Investigations:      Laboratory Testing:  Recent Results (from the past 24 hour(s))   CBC    Collection Time: 08/10/18  2:34 PM   Result Value Ref Range    WBC 13.5 (H) 3.5 - 11.0 k/uL    RBC 4.88 4.0 - 5.2 m/uL    Hemoglobin 12.9 12.0 - 16.0 g/dL    Hematocrit 39.8 36 - 46 %    MCV 81.7 80 - 100 fL    MCH 26.5 26 - 34 pg    MCHC 32.5 31 - 37 g/dL    RDW 13.5 11.5 - 14.5 %    Platelets 886 490 - 029 k/uL    MPV 9.0 6.0 - 12.0 fL    NRBC Automated NOT REPORTED per 100 WBC       Recent Labs      08/10/18   1434   HGB  12.9   HCT  39.8   WBC  13.5*   MCV  81.7       Imaging/Diagnostics:    MRI Ankle Right Wo Contrast  Result Date: 7/16/2018  EXAMINATION: MRI OF THE RIGHT ANKLE WITHOUT CONTRAST, 7/14/2018 4:20 pm TECHNIQUE: Multiplanar multisequence MRI of the right ankle was performed without the administration of intravenous contrast. COMPARISON: None HISTORY: ORDERING SYSTEM PROVIDED HISTORY: Plantar fascial fibromatosis FINDINGS: SYNDESMOTIC LIGAMENTS: The tibiofibular syndesmosis and syndesmotic ligaments are intact. There is mild thickening of the anteroinferior tibiofibular syndesmotic ligament.  LATERAL COLLATERAL LIGAMENT COMPLEX: The anterior talofibular,

## 2018-09-07 NOTE — ANESTHESIA PRE PROCEDURE
Department of Anesthesiology  Preprocedure Note       Name:  Avis Boston   Age:  29 y.o.  :  1985                                          MRN:  [id number]         Date:  2018      Surgeon: Farshad Mercedes):  Jojo Hazel DPM    Procedure: Procedure(s):  PLANTAR FASCIOTOMY RIGHT AND PRP INJECTION RIGHT FOOT - CELLSAVER    Medications prior to admission:   Prior to Admission medications    Medication Sig Start Date End Date Taking? Authorizing Provider   loratadine-pseudoephedrine (CLARITIN-D 12 HOUR) 5-120 MG per extended release tablet Claritin-D 12 Hour 5 mg-120 mg tablet,extended release   Take 1 tablet every 12 hours by oral route.    Yes Historical Provider, MD   NAPROXEN PO Take 200 mg by mouth 2 times daily as needed    Historical Provider, MD       Current medications:    Current Facility-Administered Medications   Medication Dose Route Frequency Provider Last Rate Last Dose    [START ON 2018] lactated ringers infusion   Intravenous Continuous Saturnino Batres  mL/hr at 18 0804      sodium chloride flush 0.9 % injection 10 mL  10 mL Intravenous 2 times per day Mary Matthews MD        sodium chloride flush 0.9 % injection 10 mL  10 mL Intravenous PRN Mary Matthews MD        lidocaine PF 1 % injection 1 mL  1 mL Intradermal Once PRN Mary Matthews MD         Facility-Administered Medications Ordered in Other Encounters   Medication Dose Route Frequency Provider Last Rate Last Dose    lactated ringers infusion    Continuous PRN Clyda Spanner, APRN - CRNA        midazolam (VERSED) injection    PRN Clyda Spanner, APRN - CRNA   2 mg at 18 0857    propofol injection    PRN Clyda Spanner, APRN - CRNA   180 mg at 18 0901    lidocaine 2 % injection    PRN Clyda Spanner, APRN - CRNA   50 mg at 18 0901    fentaNYL (SUBLIMAZE) injection    PRN Clyda Spanner, APRN - CRNA   50 mcg at 18 0925    dexamethasone (DECADRON) injection    PRN °C)   TempSrc:  Oral   SpO2:  100%   Weight: 201 lb 8 oz (91.4 kg)    Height: 5' 5\" (1.651 m)                                               BP Readings from Last 3 Encounters:   09/07/18 129/75   08/10/18 116/88   02/16/18 106/70       NPO Status: Time of last liquid consumption: 2200                        Time of last solid consumption: 2200                        Date of last liquid consumption: 09/06/18                        Date of last solid food consumption: 09/06/18    BMI:   Wt Readings from Last 3 Encounters:   09/07/18 201 lb 8 oz (91.4 kg)   08/10/18 201 lb 8 oz (91.4 kg)   07/16/18 193 lb (87.5 kg)     Body mass index is 33.53 kg/m². CBC:   Lab Results   Component Value Date    WBC 13.5 08/10/2018    RBC 4.88 08/10/2018    HGB 12.9 08/10/2018    HCT 39.8 08/10/2018    MCV 81.7 08/10/2018    RDW 13.5 08/10/2018     08/10/2018       CMP: No results found for: NA, K, CL, CO2, BUN, CREATININE, GFRAA, AGRATIO, LABGLOM, GLUCOSE, PROT, CALCIUM, BILITOT, ALKPHOS, AST, ALT    POC Tests: No results for input(s): POCGLU, POCNA, POCK, POCCL, POCBUN, POCHEMO, POCHCT in the last 72 hours.     Coags: No results found for: PROTIME, INR, APTT    HCG (If Applicable):   Lab Results   Component Value Date    PREGTESTUR negative 05/20/2016    HCG NEGATIVE 09/07/2018    HCGQUANT <1 02/16/2017        ABGs: No results found for: PHART, PO2ART, NPN3GUN, NJC1JBA, BEART, H8QJPYTU     Type & Screen (If Applicable):  No results found for: LABABO, [address]    Anesthesia Evaluation  Patient summary reviewed and Nursing notes reviewed no history of anesthetic complications:   Airway: Mallampati: II  TM distance: >3 FB   Neck ROM: full  Mouth opening: > = 3 FB Dental: normal exam         Pulmonary:normal exam        (-) COPD and asthma                           Cardiovascular:  Exercise tolerance: no interval change,       (-) pacemaker, hypertension, CAD, CABG/stent and dysrhythmias        Rate: normal

## 2018-09-12 ENCOUNTER — OFFICE VISIT (OUTPATIENT)
Dept: PODIATRY | Age: 33
End: 2018-09-12

## 2018-09-12 VITALS — BODY MASS INDEX: 32.15 KG/M2 | WEIGHT: 193 LBS | HEIGHT: 65 IN

## 2018-09-12 DIAGNOSIS — Z98.890 POST-OPERATIVE STATE: Primary | ICD-10-CM

## 2018-09-12 PROCEDURE — 99024 POSTOP FOLLOW-UP VISIT: CPT | Performed by: PODIATRIST

## 2018-09-12 RX ORDER — PREDNISONE 20 MG/1
TABLET ORAL
COMMUNITY
End: 2019-04-15

## 2018-09-12 RX ORDER — IBUPROFEN 800 MG/1
800 TABLET ORAL EVERY 8 HOURS PRN
Qty: 90 TABLET | Refills: 2 | Status: SHIPPED | OUTPATIENT
Start: 2018-09-12 | End: 2020-01-09

## 2018-09-12 NOTE — PROGRESS NOTES
[address] [facility] [address] [facility]  [address]  Dept: [phone]    POST-OP PROGRESS NOTE  Date of patient's visit: 9/12/2018  Patient's Name:  Neeta Redd YOB: 1985            Patient Care Team:  Melia Everett MD as PCP - General (Family Medicine)  Hanane Nichols DO as Consulting Physician (Obstetrics & Gynecology)  Bunny Foster DPM as Consulting Physician (Podiatry)        Chief Complaint   Patient presents with    Post-Op Check     right foot           Subjective: Neeta Redd is a 29 y.o. female who presents to the office today 5 day(s)  S/P PLANTAR FASCIOTOMY RIGHT AND PRP INJECTION RIGHT FOOT   for correction of PLANTAR FASCITIS RIGHT. Patient states the pain medication is making her nauseous. She is requesting a new pain medication if possible. Problem List Items Addressed This Visit     None      Visit Diagnoses     Post-operative state    -  Primary      . Patient relates pain is Present and IMPROVED Pain is rated 4 out of 10 and is described as intermittent. Currently denies F/C/N/V. Patient is taking pain medications as prescribed and is controlling pain. Physical Examination:  Incision is coapted, sutures/steri-strips are intact. Minimal bleeding post operatively. Edema present. No erythema. No Pus. Operative correction is satisfactory. Assessment: Neeta Redd is status post PLANTAR FASCIOTOMY RIGHT AND PRP INJECTION RIGHT FOOT   Normal post operative course. Doing well            ICD-10-CM ICD-9-CM    1. Post-operative state Z98.890 V45.89          Plan:  Patient examined and evaluated. Current condition and treatment options discussed in detail. Advised pt to to her condtion. She is to take ibuprofen to be taken q 8 hrs. Verbal and written instructions given to patient. Orders: No orders of the defined types were placed in this encounter.      Contact office with any

## 2018-09-19 ENCOUNTER — OFFICE VISIT (OUTPATIENT)
Dept: PODIATRY | Age: 33
End: 2018-09-19

## 2018-09-19 VITALS — HEIGHT: 65 IN | WEIGHT: 193 LBS | BODY MASS INDEX: 32.15 KG/M2

## 2018-09-19 DIAGNOSIS — Z98.890 POST-OPERATIVE STATE: Primary | ICD-10-CM

## 2018-09-19 PROCEDURE — 99024 POSTOP FOLLOW-UP VISIT: CPT | Performed by: PODIATRIST

## 2018-09-19 NOTE — PROGRESS NOTES
questions/problems/concerns.   RTC in 2 weeks     Electronically signed by Ruthann Coe DPM on 9/19/2018 at 10:58 AM  9/19/2018

## 2018-10-03 ENCOUNTER — OFFICE VISIT (OUTPATIENT)
Dept: PODIATRY | Age: 33
End: 2018-10-03

## 2018-10-03 DIAGNOSIS — M72.2 PLANTAR FASCIAL FIBROMATOSIS: ICD-10-CM

## 2018-10-03 DIAGNOSIS — Z98.890 POST-OPERATIVE STATE: Primary | ICD-10-CM

## 2018-10-03 DIAGNOSIS — M79.672 PAIN IN LEFT FOOT: ICD-10-CM

## 2018-10-03 PROCEDURE — 99024 POSTOP FOLLOW-UP VISIT: CPT | Performed by: PODIATRIST

## 2018-10-03 NOTE — PROGRESS NOTES
Verbal and written instructions given to patient. Orders: No orders of the defined types were placed in this encounter. Contact office with any questions/problems/concerns. RTC in 2week(s).      Electronically signed by Kaitlin Barney DPM on 10/3/2018 at 2:13 PM  10/3/2018

## 2018-10-17 ENCOUNTER — OFFICE VISIT (OUTPATIENT)
Dept: PODIATRY | Age: 33
End: 2018-10-17

## 2018-10-17 VITALS — WEIGHT: 193 LBS | BODY MASS INDEX: 32.15 KG/M2 | HEIGHT: 65 IN

## 2018-10-17 DIAGNOSIS — Z98.890 POST-OPERATIVE STATE: Primary | ICD-10-CM

## 2018-10-17 PROCEDURE — 99024 POSTOP FOLLOW-UP VISIT: CPT | Performed by: PODIATRIST

## 2018-10-17 RX ORDER — LIDOCAINE AND PRILOCAINE 25; 25 MG/G; MG/G
CREAM TOPICAL
Qty: 3 TUBE | Refills: 1 | Status: SHIPPED | OUTPATIENT
Start: 2018-10-17 | End: 2019-04-15

## 2018-10-29 ENCOUNTER — TELEPHONE (OUTPATIENT)
Dept: OBGYN CLINIC | Age: 33
End: 2018-10-29

## 2018-10-29 DIAGNOSIS — N92.6 MISSED PERIOD: Primary | ICD-10-CM

## 2018-10-31 ENCOUNTER — HOSPITAL ENCOUNTER (OUTPATIENT)
Age: 33
Discharge: HOME | End: 2018-10-31
Payer: COMMERCIAL

## 2018-10-31 ENCOUNTER — OFFICE VISIT (OUTPATIENT)
Dept: PODIATRY | Age: 33
End: 2018-10-31

## 2018-10-31 VITALS — WEIGHT: 193 LBS | BODY MASS INDEX: 32.15 KG/M2 | HEIGHT: 65 IN

## 2018-10-31 DIAGNOSIS — Z98.890 POST-OPERATIVE STATE: Primary | ICD-10-CM

## 2018-10-31 DIAGNOSIS — N92.6 MISSED MENSES: ICD-10-CM

## 2018-10-31 LAB — HCG QUANTITATIVE: 2384 IU/L

## 2018-10-31 PROCEDURE — 99024 POSTOP FOLLOW-UP VISIT: CPT | Performed by: PODIATRIST

## 2018-10-31 PROCEDURE — 36415 COLL VENOUS BLD VENIPUNCTURE: CPT

## 2018-10-31 PROCEDURE — 84702 CHORIONIC GONADOTROPIN TEST: CPT

## 2018-10-31 NOTE — PROGRESS NOTES
[address] [facility] [address] [facility]  [address]  Dept: [phone]    POST-OP PROGRESS NOTE  Date of patient's visit: 10/31/2018  Patient's Name:  Palomo Pimentel YOB: 1985            Patient Care Team:  Carole Worrell MD as PCP - General (Family Medicine)  Luana Carvajal DO as Consulting Physician (Obstetrics & Gynecology)  Priyanka Flores DPM as Consulting Physician (Podiatry)        Chief Complaint   Patient presents with    Post-Op Check     8 weeks right foot           Subjective: Palomo Pimentel is a 29 y.o. female who presents to the office today 8 week(s)  S/P right foot open plantar fasciectomy for correction of right heel pain. Problem List Items Addressed This Visit     None      Visit Diagnoses     Post-operative state    -  Primary      . Patient relates pain is Present and improved. Pain is rated 0 out of 10 and is described as intermittent. Currently denies F/C/N/V. Physical Examination:  Incision is coapted, sutures/steri-strips are intact. Minimal bleeding post operatively. Edema present. No erythema. No Pus. Operative correction is satisfactory. she feels really great. Assessment: Palomo Pimentel is status post as above  Normal post operative course. ICD-10-CM    1. Post-operative state Z98.890          Plan:  Patient examined and evaluated. Current condition and treatment options discussed in detail. Verbal and written instructions given to patient. Orders: No orders of the defined types were placed in this encounter. Contact office with any questions/problems/concerns.     Pt can go back to work and have no restrictions     Electronically signed by Priyanka Flores DPM on 10/31/2018 at 10:37 AM  10/31/2018

## 2018-10-31 NOTE — LETTER
[address] [facility] [address]  [address]  [address]  Phone: [phone]    Alberto Reyes        October 31, 4100     Patient: Preet Nguyen   YOB: 1985   Date of Visit: 10/31/2018       To Whom It May Concern: It is my medical opinion that Preet Nguyen may return to full duty immediately with no restrictions on 11/05/2018. If you have any questions or concerns, please don't hesitate to call.     Sincerely,        Segun Gallego DPM

## 2018-11-01 ENCOUNTER — TELEPHONE (OUTPATIENT)
Dept: OBGYN CLINIC | Age: 33
End: 2018-11-01

## 2018-11-01 DIAGNOSIS — Z34.90 EARLY STAGE OF PREGNANCY: Primary | ICD-10-CM

## 2019-03-06 ENCOUNTER — OFFICE VISIT (OUTPATIENT)
Dept: PODIATRY | Age: 34
End: 2019-03-06
Payer: COMMERCIAL

## 2019-03-06 VITALS — BODY MASS INDEX: 35.65 KG/M2 | HEIGHT: 65 IN | WEIGHT: 214 LBS

## 2019-03-06 DIAGNOSIS — M72.2 PLANTAR FASCIAL FIBROMATOSIS: Primary | ICD-10-CM

## 2019-03-06 DIAGNOSIS — M79.672 PAIN IN LEFT FOOT: ICD-10-CM

## 2019-03-06 PROBLEM — R51.9 INTRACTABLE HEADACHE: Status: ACTIVE | Noted: 2019-02-18

## 2019-03-06 PROBLEM — R10.13 EPIGASTRIC PAIN: Status: ACTIVE | Noted: 2019-01-14

## 2019-03-06 PROBLEM — F10.11 HISTORY OF ALCOHOL ABUSE: Status: ACTIVE | Noted: 2019-01-14

## 2019-03-06 PROBLEM — Z90.49 HISTORY OF CHOLECYSTECTOMY: Status: ACTIVE | Noted: 2019-01-14

## 2019-03-06 PROCEDURE — 99213 OFFICE O/P EST LOW 20 MIN: CPT | Performed by: PODIATRIST

## 2019-03-06 PROCEDURE — 20550 NJX 1 TENDON SHEATH/LIGAMENT: CPT | Performed by: PODIATRIST

## 2019-04-01 ENCOUNTER — TELEPHONE (OUTPATIENT)
Dept: OBGYN CLINIC | Age: 34
End: 2019-04-01

## 2019-04-01 RX ORDER — PYRIDOXINE HCL (VITAMIN B6) 50 MG
50 TABLET ORAL 2 TIMES DAILY
Qty: 60 TABLET | Refills: 0 | Status: SHIPPED | OUTPATIENT
Start: 2019-04-01 | End: 2019-04-15

## 2019-04-15 ENCOUNTER — HOSPITAL ENCOUNTER (EMERGENCY)
Age: 34
Discharge: HOME | End: 2019-04-15
Attending: EMERGENCY MEDICINE
Payer: COMMERCIAL

## 2019-04-15 VITALS
HEIGHT: 65 IN | BODY MASS INDEX: 34.99 KG/M2 | RESPIRATION RATE: 19 BRPM | TEMPERATURE: 97.7 F | OXYGEN SATURATION: 98 % | DIASTOLIC BLOOD PRESSURE: 64 MMHG | HEART RATE: 87 BPM | SYSTOLIC BLOOD PRESSURE: 129 MMHG | WEIGHT: 210 LBS

## 2019-04-15 DIAGNOSIS — R11.2 NON-INTRACTABLE VOMITING WITH NAUSEA, UNSPECIFIED VOMITING TYPE: Primary | ICD-10-CM

## 2019-04-15 LAB
-: ABNORMAL
ABO/RH: NORMAL
ABSOLUTE EOS #: 0 K/UL (ref 0–0.4)
ABSOLUTE IMMATURE GRANULOCYTE: ABNORMAL K/UL (ref 0–0.3)
ABSOLUTE LYMPH #: 0.8 K/UL (ref 1–4.8)
ABSOLUTE MONO #: 0.6 K/UL (ref 0.2–0.8)
ALBUMIN SERPL-MCNC: 3.7 G/DL (ref 3.5–5.2)
ALBUMIN/GLOBULIN RATIO: ABNORMAL (ref 1–2.5)
ALP BLD-CCNC: 50 U/L (ref 35–104)
ALT SERPL-CCNC: 62 U/L (ref 5–33)
AMORPHOUS: ABNORMAL
AMYLASE: 43 U/L (ref 28–100)
ANION GAP SERPL CALCULATED.3IONS-SCNC: 14 MMOL/L (ref 9–17)
AST SERPL-CCNC: 48 U/L
BACTERIA: ABNORMAL
BASOPHILS # BLD: 1 % (ref 0–2)
BASOPHILS ABSOLUTE: 0 K/UL (ref 0–0.2)
BILIRUB SERPL-MCNC: 0.16 MG/DL (ref 0.3–1.2)
BILIRUBIN DIRECT: <0.08 MG/DL
BILIRUBIN URINE: ABNORMAL
BILIRUBIN, INDIRECT: ABNORMAL MG/DL (ref 0–1)
BUN BLDV-MCNC: 5 MG/DL (ref 6–20)
BUN/CREAT BLD: 11 (ref 9–20)
CALCIUM SERPL-MCNC: 8.5 MG/DL (ref 8.6–10.4)
CASTS UA: ABNORMAL /LPF
CHLORIDE BLD-SCNC: 102 MMOL/L (ref 98–107)
CO2: 18 MMOL/L (ref 20–31)
COLOR: YELLOW
COMMENT UA: ABNORMAL
CREAT SERPL-MCNC: 0.44 MG/DL (ref 0.5–0.9)
CRYSTALS, UA: ABNORMAL /HPF
DIFFERENTIAL TYPE: ABNORMAL
EOSINOPHILS RELATIVE PERCENT: 1 % (ref 1–4)
EPITHELIAL CELLS UA: ABNORMAL /HPF (ref 0–5)
GFR AFRICAN AMERICAN: >60 ML/MIN
GFR NON-AFRICAN AMERICAN: >60 ML/MIN
GFR SERPL CREATININE-BSD FRML MDRD: ABNORMAL ML/MIN/{1.73_M2}
GFR SERPL CREATININE-BSD FRML MDRD: ABNORMAL ML/MIN/{1.73_M2}
GLOBULIN: ABNORMAL G/DL (ref 1.5–3.8)
GLUCOSE BLD-MCNC: 92 MG/DL (ref 70–99)
GLUCOSE URINE: NEGATIVE
HCG QUANTITATIVE: ABNORMAL IU/L
HCG, PREGNANCY URINE (POC): POSITIVE
HCT VFR BLD CALC: 36.1 % (ref 36–46)
HEMOGLOBIN: 12.2 G/DL (ref 12–16)
IMMATURE GRANULOCYTES: ABNORMAL %
KETONES, URINE: ABNORMAL
LEUKOCYTE ESTERASE, URINE: ABNORMAL
LIPASE: 35 U/L (ref 13–60)
LYMPHOCYTES # BLD: 18 % (ref 24–44)
MAGNESIUM: 1.8 MG/DL (ref 1.6–2.6)
MCH RBC QN AUTO: 26.7 PG (ref 26–34)
MCHC RBC AUTO-ENTMCNC: 33.7 G/DL (ref 31–37)
MCV RBC AUTO: 79.2 FL (ref 80–100)
MONOCYTES # BLD: 13 % (ref 1–7)
MUCUS: ABNORMAL
NITRITE, URINE: NEGATIVE
NRBC AUTOMATED: ABNORMAL PER 100 WBC
OTHER OBSERVATIONS UA: ABNORMAL
PDW BLD-RTO: 12.6 % (ref 11.5–14.5)
PH UA: 6 (ref 5–8)
PLATELET # BLD: 291 K/UL (ref 130–400)
PLATELET ESTIMATE: ABNORMAL
PMV BLD AUTO: ABNORMAL FL (ref 6–12)
POTASSIUM SERPL-SCNC: 3.3 MMOL/L (ref 3.7–5.3)
PROTEIN UA: ABNORMAL
RBC # BLD: 4.56 M/UL (ref 4–5.2)
RBC # BLD: ABNORMAL 10*6/UL
RBC UA: ABNORMAL /HPF (ref 0–2)
RENAL EPITHELIAL, UA: ABNORMAL /HPF
SEG NEUTROPHILS: 67 % (ref 36–66)
SEGMENTED NEUTROPHILS ABSOLUTE COUNT: 3.4 K/UL (ref 1.8–7.7)
SODIUM BLD-SCNC: 134 MMOL/L (ref 135–144)
SPECIFIC GRAVITY UA: 1.03 (ref 1–1.03)
TOTAL PROTEIN: 7.3 G/DL (ref 6.4–8.3)
TRICHOMONAS: ABNORMAL
TURBIDITY: ABNORMAL
URINE HGB: ABNORMAL
UROBILINOGEN, URINE: NORMAL
WBC # BLD: 4.8 K/UL (ref 3.5–11)
WBC # BLD: ABNORMAL 10*3/UL
WBC UA: ABNORMAL /HPF (ref 0–5)
YEAST: ABNORMAL

## 2019-04-15 PROCEDURE — C9113 INJ PANTOPRAZOLE SODIUM, VIA: HCPCS | Performed by: EMERGENCY MEDICINE

## 2019-04-15 PROCEDURE — 86900 BLOOD TYPING SEROLOGIC ABO: CPT

## 2019-04-15 PROCEDURE — 96361 HYDRATE IV INFUSION ADD-ON: CPT

## 2019-04-15 PROCEDURE — 80076 HEPATIC FUNCTION PANEL: CPT

## 2019-04-15 PROCEDURE — 84703 CHORIONIC GONADOTROPIN ASSAY: CPT

## 2019-04-15 PROCEDURE — 99283 EMERGENCY DEPT VISIT LOW MDM: CPT

## 2019-04-15 PROCEDURE — 6360000002 HC RX W HCPCS: Performed by: EMERGENCY MEDICINE

## 2019-04-15 PROCEDURE — 86901 BLOOD TYPING SEROLOGIC RH(D): CPT

## 2019-04-15 PROCEDURE — 81001 URINALYSIS AUTO W/SCOPE: CPT

## 2019-04-15 PROCEDURE — 83735 ASSAY OF MAGNESIUM: CPT

## 2019-04-15 PROCEDURE — 96374 THER/PROPH/DIAG INJ IV PUSH: CPT

## 2019-04-15 PROCEDURE — 85025 COMPLETE CBC W/AUTO DIFF WBC: CPT

## 2019-04-15 PROCEDURE — 80048 BASIC METABOLIC PNL TOTAL CA: CPT

## 2019-04-15 PROCEDURE — 84702 CHORIONIC GONADOTROPIN TEST: CPT

## 2019-04-15 PROCEDURE — 82150 ASSAY OF AMYLASE: CPT

## 2019-04-15 PROCEDURE — 96375 TX/PRO/DX INJ NEW DRUG ADDON: CPT

## 2019-04-15 PROCEDURE — 83690 ASSAY OF LIPASE: CPT

## 2019-04-15 PROCEDURE — 2580000003 HC RX 258: Performed by: EMERGENCY MEDICINE

## 2019-04-15 RX ORDER — ONDANSETRON 2 MG/ML
4 INJECTION INTRAMUSCULAR; INTRAVENOUS ONCE
Status: COMPLETED | OUTPATIENT
Start: 2019-04-15 | End: 2019-04-15

## 2019-04-15 RX ORDER — 0.9 % SODIUM CHLORIDE 0.9 %
2000 INTRAVENOUS SOLUTION INTRAVENOUS ONCE
Status: COMPLETED | OUTPATIENT
Start: 2019-04-15 | End: 2019-04-15

## 2019-04-15 RX ORDER — 0.9 % SODIUM CHLORIDE 0.9 %
10 VIAL (ML) INJECTION ONCE
Status: DISCONTINUED | OUTPATIENT
Start: 2019-04-15 | End: 2019-04-15 | Stop reason: HOSPADM

## 2019-04-15 RX ORDER — PROMETHAZINE HYDROCHLORIDE 25 MG/ML
12.5 INJECTION, SOLUTION INTRAMUSCULAR; INTRAVENOUS ONCE
Status: COMPLETED | OUTPATIENT
Start: 2019-04-15 | End: 2019-04-15

## 2019-04-15 RX ORDER — ONDANSETRON 4 MG/1
4 TABLET, ORALLY DISINTEGRATING ORAL EVERY 8 HOURS PRN
Qty: 20 TABLET | Refills: 0 | Status: SHIPPED | OUTPATIENT
Start: 2019-04-15 | End: 2019-09-03

## 2019-04-15 RX ORDER — PANTOPRAZOLE SODIUM 40 MG/10ML
40 INJECTION, POWDER, LYOPHILIZED, FOR SOLUTION INTRAVENOUS ONCE
Status: COMPLETED | OUTPATIENT
Start: 2019-04-15 | End: 2019-04-15

## 2019-04-15 RX ADMIN — SODIUM CHLORIDE 2000 ML: 9 INJECTION, SOLUTION INTRAVENOUS at 01:12

## 2019-04-15 RX ADMIN — PROMETHAZINE HYDROCHLORIDE 12.5 MG: 25 INJECTION INTRAMUSCULAR; INTRAVENOUS at 01:17

## 2019-04-15 RX ADMIN — ONDANSETRON 4 MG: 2 INJECTION INTRAMUSCULAR; INTRAVENOUS at 01:17

## 2019-04-15 RX ADMIN — PANTOPRAZOLE SODIUM 40 MG: 40 INJECTION, POWDER, LYOPHILIZED, FOR SOLUTION INTRAVENOUS at 01:17

## 2019-04-15 ASSESSMENT — PAIN SCALES - GENERAL: PAINLEVEL_OUTOF10: 10

## 2019-04-15 NOTE — ED PROVIDER NOTES
[address] [place] [address] ED  eMERGENCY dEPARTMENT eNCOUnter      Pt Name: Ganga De Oliveira  MRN: [id number]  [place] 1985  Date of evaluation: 4/15/2019  Provider: Shagufta Kimble MD    [address] [place] [address]       Chief Complaint   Patient presents with    Abdominal Pain    Emesis     diarrhea         HISTORY OF PRESENT ILLNESS  (Location/Symptom, Timing/Onset, Context/Setting, Quality, Duration, Modifying Factors, Severity.)   Ganga De Oliveira is a 29 y.o. female who presents to the emergency department for reevaluation of sustained vomiting and diarrhea. Patient was evaluated yesterday at [facility].  She had an ultrasound performed confirming 8 week 6 day gestation, no evidence of appendicitis. Patient states she's in the process of aborting this pregnancy and taking misoprostol. She now has sustained vomiting and diarrhea symptoms. Nursing Notes were reviewed.     ALLERGIES     No known allergies    CURRENT MEDICATIONS       Previous Medications    IBUPROFEN (ADVIL;MOTRIN) 800 MG TABLET    Take 1 tablet by mouth every 8 hours as needed for Pain    NAPROXEN PO    Take 200 mg by mouth 2 times daily as needed       PAST MEDICAL HISTORY         Diagnosis Date    Alcohol abuse 2/8/2017    Atypical squamous cell changes of undetermined significance (ASCUS) on cervical cytology with positive high risk human papilloma virus (HPV) 1/5/16    Chlamydia 10/9/12    Depressive disorder 06/06/2013    is doing well now, no longer on medication    Generalized anxiety disorder     History of anxiety     HPV in female 1/2016    Mixed anxiety and depressive disorder     Seasonal allergies     Strep throat     Tonsillitis 08/2018       SURGICAL HISTORY           Procedure Laterality Date    CHOLECYSTECTOMY      PLANTAR FASCIA SURGERY Right 09/07/2018    PLANTAR FASCIOTOMY RIGHT AND PRP INJECTION RIGHT FOOT - CELLSAVER    OK INCISION OF FOOT/TOE FASCIA Right 9/7/2018    PLANTAR FASCIOTOMY RIGHT AND PRP auscultation without wheezes rales or rhonchi. Abdomen is mildly obese. Abdomen is soft. Fundal height not appreciable to palpation. She does not have any focal rebound. Extremities show no gross abnormality. Integument without rash or lesion. No neurovascular deficits are noted. DIAGNOSTIC RESULTS           RADIOLOGY:   Non-plain film images such as CT, Ultrasound and MRI are read by the radiologist. Plain radiographic images are visualized and preliminarily interpreted by the emergency physician with the below findings:        Interpretation per the Radiologist below, if available at the time of this note:    No orders to display     Result Narrative   Pelvic ultrasound    History: Pelvic pain, ovarian torsion, right lower quadrant, neck pain    Comparison: 10/17/2018    Findings:    Transabdominal and transvaginal sonographic evaluation of the pelvis. Transabdominal imaging performed to evaluate for extra adnexal pelvic pathology. Transvaginal imaging performed for better delineation of the adnexal and endometrial contents. Uterus measures 12.1 x 8.1 x 7.3 cm. Within the imaged cavity is a gestational sac measuring 3.3 cm corresponding to an 8 week 2 day gestation.  Possible small subchorionic hemorrhage.  Amniotic fluid via is appropriate for patient's age.  Is no uterus given early gestational age. Fetal pole measures 2.2 cm corresponding to 8 weeks 6 days.  Fetal heart rate 180 bpm.    Yolk sac measures 0.4 cm. Ovaries are unremarkable the right measures 3.5 x 2.5 x 2.2 cm.  The left 2.5 x 1.4 x 1.6 cm. Doppler Evaluation    Procedure:    Real time gray scale, color flow imaging and duplex spectral Doppler waveform analysis evaluation was performed of the major arterial inflow and venous outflow structures of the ovaries with arterial and venous spectral waveforms obtained and reviewed in view of the clinical history of pelvic pain   ovarian torsion.   Findings:    Duplex spectral Doppler document arterial and venous spectral waveforms documented within the major arterial inflow and venous outflow of both  ovaries .  Normal arterial venous Doppler device spectral waveforms without evidence of ovarian torsion. Impression:    Single live intrauterine pregnancy corresponding to a 8 week 6 day gestation by crown-rump length. Possible small subchorionic hemorrhage. Unremarkable ovaries without evidence of ovarian torsion. ______________________________________________      STUDY: Ultrasound abdomen    CLINICAL HISTORY: Abdominal pain, right lower quadrant pain, appendicitis    COMPARISON: None. FINDINGS:    Real-time sonographic evaluation of the abdomen and right lower quadrant.  Nonvisualization the appendix.  No right lower quadrant fluid collection.  Appendicitis cannot be excluded. IMPRESSION:    1. Nonvisualization of the appendix.  No focal fluid collection.     Workstation:[id number]    Finalized by Uriel Velarde MD on 4/14/2019 12:03 AM   Other Result Information         ED BEDSIDE ULTRASOUND:   Performed by ED Physician - none    LABS:  Labs Reviewed   BASIC METABOLIC PANEL - Abnormal; Notable for the following components:       Result Value    BUN 5 (*)     CREATININE 0.44 (*)     Calcium 8.5 (*)     Sodium 134 (*)     Potassium 3.3 (*)     CO2 18 (*)     All other components within normal limits   CBC WITH AUTO DIFFERENTIAL - Abnormal; Notable for the following components:    MCV 79.2 (*)     Seg Neutrophils 67 (*)     Lymphocytes 18 (*)     Monocytes 13 (*)     Absolute Lymph # 0.80 (*)     All other components within normal limits   HEPATIC FUNCTION PANEL - Abnormal; Notable for the following components:    ALT 62 (*)     AST 48 (*)     Total Bilirubin 0.16 (*)     All other components within normal limits   CULTURE STOOL   C DIFF TOXIN B BY RT PCR   GIARDIA ANTIGEN   MAGNESIUM   AMYLASE   LIPASE   URINALYSIS   HCG, QUANTITATIVE, PREGNANCY   BLOOD OCCULT STOOL an appointment as soon as possible for a visit       [facility] ED  [address] [facility]  [phone]    As needed      DISCHARGE MEDICATIONS:     New Prescriptions    ONDANSETRON (ZOFRAN ODT) 4 MG DISINTEGRATING TABLET    Take 1 tablet by mouth every 8 hours as needed for Nausea           (Please note that portions of this note were completed with a voice recognition program.  Efforts were made to edit the dictations but occasionally words are mis-transcribed.)    Felice Ahumada, MD  Attending Emergency Physician         Felice Ahumada, MD  04/15/19 0684

## 2019-04-15 NOTE — ED NOTES
Pt presents to the er c/o generalized abdominal pain nausea and vomiting that started two days ago pt states that she was seen yesterday at [facility] for the same thing. Pt states that she is currently 8 weeks pregnant and in the midst of going through an  pt states that she took the first pill for the  yesterday.      Milind Solorio RN  04/15/19 [id number]

## 2019-06-03 ENCOUNTER — OFFICE VISIT (OUTPATIENT)
Dept: OBGYN CLINIC | Age: 34
End: 2019-06-03
Payer: COMMERCIAL

## 2019-06-03 ENCOUNTER — HOSPITAL ENCOUNTER (OUTPATIENT)
Age: 34
Setting detail: SPECIMEN
Discharge: HOME | End: 2019-06-03
Payer: COMMERCIAL

## 2019-06-03 VITALS
HEIGHT: 65 IN | SYSTOLIC BLOOD PRESSURE: 112 MMHG | WEIGHT: 211 LBS | DIASTOLIC BLOOD PRESSURE: 84 MMHG | BODY MASS INDEX: 35.16 KG/M2

## 2019-06-03 DIAGNOSIS — Z11.3 SCREENING EXAMINATION FOR STD (SEXUALLY TRANSMITTED DISEASE): ICD-10-CM

## 2019-06-03 DIAGNOSIS — Z01.419 WELL WOMAN EXAM: ICD-10-CM

## 2019-06-03 DIAGNOSIS — Z01.419 WELL WOMAN EXAM: Primary | ICD-10-CM

## 2019-06-03 DIAGNOSIS — Z11.51 SCREENING FOR HPV (HUMAN PAPILLOMAVIRUS): ICD-10-CM

## 2019-06-03 LAB
DIRECT EXAM: ABNORMAL
Lab: ABNORMAL
SPECIMEN DESCRIPTION: ABNORMAL

## 2019-06-03 PROCEDURE — 99395 PREV VISIT EST AGE 18-39: CPT | Performed by: NURSE PRACTITIONER

## 2019-06-03 PROCEDURE — 87480 CANDIDA DNA DIR PROBE: CPT

## 2019-06-03 PROCEDURE — G0145 SCR C/V CYTO,THINLAYER,RESCR: HCPCS

## 2019-06-03 PROCEDURE — 87624 HPV HI-RISK TYP POOLED RSLT: CPT

## 2019-06-03 PROCEDURE — 87491 CHLMYD TRACH DNA AMP PROBE: CPT

## 2019-06-03 PROCEDURE — 87510 GARDNER VAG DNA DIR PROBE: CPT

## 2019-06-03 PROCEDURE — 87591 N.GONORRHOEAE DNA AMP PROB: CPT

## 2019-06-03 PROCEDURE — 87660 TRICHOMONAS VAGIN DIR PROBE: CPT

## 2019-06-03 RX ORDER — NORETHINDRONE ACETATE AND ETHINYL ESTRADIOL 1MG-20(21)
1 KIT ORAL DAILY
Qty: 1 PACKET | Refills: 12 | Status: SHIPPED | OUTPATIENT
Start: 2019-06-03 | End: 2019-09-03

## 2019-06-03 ASSESSMENT — PATIENT HEALTH QUESTIONNAIRE - PHQ9
SUM OF ALL RESPONSES TO PHQ9 QUESTIONS 1 & 2: 0
SUM OF ALL RESPONSES TO PHQ QUESTIONS 1-9: 0
SUM OF ALL RESPONSES TO PHQ QUESTIONS 1-9: 0
1. LITTLE INTEREST OR PLEASURE IN DOING THINGS: 0
2. FEELING DOWN, DEPRESSED OR HOPELESS: 0

## 2019-06-03 NOTE — PATIENT INSTRUCTIONS
Patient Education        Combination Birth Control Pills: Care Instructions  Your Care Instructions    Combination birth control pills are used to prevent pregnancy. They give you a regular dose of the hormones estrogen and progestin. You take a hormone pill every day to prevent pregnancy. Birth control pills come in packs. The most common type has 3 weeks of hormone pills. Some packs have sugar pills (they do not contain any hormones) for the fourth week. During that fourth no-hormone week, you have your period. After the fourth week (28 days), you start a new pack. Some birth control pills are packaged in different ways. For example, some have hormone pills for the fourth week instead of sugar pills. Taking hormones for the entire month causes you to not have periods or to have fewer periods. Others are packaged so that you have a period every 3 months. Your doctor will tell you what type of pills you have. Follow-up care is a key part of your treatment and safety. Be sure to make and go to all appointments, and call your doctor if you are having problems. It's also a good idea to know your test results and keep a list of the medicines you take. How can you care for yourself at home? How do you take the pill? · Follow your doctor's instructions about when to start taking your pills. Use backup birth control, such as a condom, or don't have intercourse for 7 days after you start your pills. · Take your pills every day, at about the same time of day. To help yourself do this, try to take them when you do something else every day, such as brushing your teeth. What if you forget to take a pill? Always read the label for specific instructions, or call your doctor. Here are some basic guidelines:  · If you miss 1 hormone pill, take it as soon as you remember. Ask your doctor if you may need to use a backup birth control method, such as a condom, or not have intercourse.   · If you miss 2 or more hormone pills, take one as soon as you remember you forgot them. Then read the pill label or call your doctor about instructions on how to take your missed pills. Use a backup method of birth control or don't have intercourse for 7 days. Pregnancy is more likely if you miss more than 1 pill. · If you had intercourse, you can use emergency contraception, such as the morning-after pill (Plan B). You can use emergency contraception for up to 5 days after having had intercourse, but it works best if you take it right away. What else do you need to know? · The pill has side effects. ? You may have very light or skipped periods. ? You may have bleeding between periods (spotting). This usually decreases after 3 to 4 months. ? You may have mood changes, less interest in sex, or weight gain. · The pill may reduce acne, heavy bleeding and cramping, and symptoms of premenstrual syndrome. · Check with your doctor before you use any other medicines, including over-the-counter medicines, vitamins, herbal products, and supplements. Birth control hormones may not work as well to prevent pregnancy when combined with other medicines. · The pill doesn't protect against sexually transmitted infection (STIs), such as herpes or HIV/AIDS. If you're not sure whether your sex partner might have an STI, use a condom to protect against disease. When should you call for help? Call your doctor now or seek immediate medical care if:    · You have severe belly pain.     · You have signs of a blood clot, such as:  ? Pain in your calf, back of the knee, thigh, or groin. ?  Redness and swelling in your leg or groin.     · You have blurred vision or other problems seeing.     · You have a severe headache.     · You have severe trouble breathing.    Watch closely for changes in your health, and be sure to contact your doctor if:    · You think you might be pregnant.     · You think you may be depressed.     · You think you may have been exposed to or have

## 2019-06-03 NOTE — PROGRESS NOTES
History and Physical  [address], [address] [facility] [address], [address] [phone]   Fax [phone]  Suzan Ramirez  [birth year]              29 y.o. No chief complaint on file. Patient's last menstrual period was 2019. Primary Care Physician: Eagle Alejo M.D., MD    The patient was seen and examined. She has no chief complaint today and is here for her annual exam. Patient here to discuss birth control options. Hx of  a few months ago. LMP 2019. Had Mirena in a few years ago and didn't like the mood swings. Interested in nonhormonal options. Discussed both nonhormonal and hormonal options. Denies smoking. Denies personal or family hx of blood clots to leg, lung or brain. Decides to try OCPs. Her bowels are regular. There are no voiding complaints. She denies any bloating. She denies vaginal discharge and was counseled on STD's and the need for barrier contraception.      HPI : Suzan Ramirez is a 29 y.o. female     Annual exam  Wants to start on OCPs  Hx of abnormal pap 2018 ASCUS, +HPV- did not complete recommended colposcopy  STD screening    ________________________________________________________________________  OB History    Para Term  AB Living   2 2 2 0 0 2   SAB TAB Ectopic Molar Multiple Live Births   0 0 0 0 0 0      # Outcome Date GA Lbr Henrik/2nd Weight Sex Delivery Anes PTL Lv   2 Term 02/15/09 39w0d 07:00 7 lb 12 oz (3.515 kg) F Vag-Spont None        Name: Sen Slice   1 Term 06 40w0d 07:00 8 lb 5 oz (3.771 kg) F Vag-Forceps None        Name: Marni Gotti     Past Medical History:   Diagnosis Date    Alcohol abuse 2017    Atypical squamous cell changes of undetermined significance (ASCUS) on cervical cytology with positive high risk human papilloma virus (HPV) 16    Chlamydia 10/9/12    Depressive disorder 2013    is doing well now, no longer on medication    Generalized anxiety disorder     History of anxiety     HPV in female 2016    Mixed anxiety and depressive disorder     Seasonal allergies     Strep throat     Tonsillitis 2018                                                                   Past Surgical History:   Procedure Laterality Date    CHOLECYSTECTOMY      PLANTAR FASCIA SURGERY Right 2018    PLANTAR FASCIOTOMY RIGHT AND PRP INJECTION RIGHT FOOT - CELLSAVER    [place] INCISION OF FOOT/TOE FASCIA Right 2018    PLANTAR FASCIOTOMY RIGHT AND PRP INJECTION RIGHT FOOT - CELLSAVER performed by Shravan Edge DPM at [address] [place] [address] History   Problem Relation Age of Onset    Other Paternal Grandfather         sepsis    Diabetes Paternal Grandfather     Kidney Disease Paternal Grandfather         had transplant that caused sepsis    Cancer Paternal Grandmother         unsure of kind    Hypertension Paternal Grandmother     Hypertension Father     No Known Problems Mother     Breast Cancer Neg Hx     Colon Cancer Neg Hx     Eclampsia Neg Hx     Ovarian Cancer Neg Hx      Labor Neg Hx     Spont Abortions Neg Hx     Stroke Neg Hx     Heart Attack Neg Hx      Social History     Socioeconomic History    Marital status: Single     Spouse name: Not on file    Number of children: Not on file    Years of education: Not on file    Highest education level: Not on file   Occupational History    Not on file   Social Needs    Financial resource strain: Not on file    Food insecurity:     Worry: Not on file     Inability: Not on file    Transportation needs:     Medical: Not on file     Non-medical: Not on file   Tobacco Use    Smoking status: Never Smoker    Smokeless tobacco: Never Used   Substance and Sexual Activity    Alcohol use:  Yes     Alcohol/week: 0.0 oz     Comment: Occassional    Drug use: No    Sexual activity: Yes     Partners: Male     Birth control/protection: IUD     Comment: IUD inserted in 3/2009 Lifestyle    Physical activity:     Days per week: Not on file     Minutes per session: Not on file    Stress: Not on file   Relationships    Social connections:     Talks on phone: Not on file     Gets together: Not on file     Attends Jew service: Not on file     Active member of club or organization: Not on file     Attends meetings of clubs or organizations: Not on file     Relationship status: Not on file    Intimate partner violence:     Fear of current or ex partner: Not on file     Emotionally abused: Not on file     Physically abused: Not on file     Forced sexual activity: Not on file   Other Topics Concern    Not on file   Social History Narrative    Not on file       MEDICATIONS:  Current Outpatient Medications   Medication Sig Dispense Refill    norethindrone-ethinyl estradiol (1110 Dominique MENCHACA 1/20) 1-20 MG-MCG per tablet Take 1 tablet by mouth daily for 28 days 1 packet 12    ondansetron (ZOFRAN ODT) 4 MG disintegrating tablet Take 1 tablet by mouth every 8 hours as needed for Nausea 20 tablet 0    ibuprofen (ADVIL;MOTRIN) 800 MG tablet Take 1 tablet by mouth every 8 hours as needed for Pain 90 tablet 2    NAPROXEN PO Take 200 mg by mouth 2 times daily as needed       No current facility-administered medications for this visit. ALLERGIES:  Allergies as of 06/03/2019 - Review Complete 06/03/2019   Allergen Reaction Noted    No known allergies         Symptoms of decreased mood absent  Symptoms of anhedonia absent    **If either question is answered in a  positive fashion then complete the PHQ9 Scoring Evaluation and make the appropriate referral**      Immunization status: stated as current, but no records available. Gynecologic History:  Menarche: 16 yo  Menopause at [address] [facility] yo     Patient's last menstrual period was 05/17/2019.     Sexually Active: Yes    STD History: Yes     Permanent Sterilization: No   Reversible Birth Control: yes, condoms       Hormone Replacement Exposure: No      Genetic Qualified Family History of Breast, Ovarian , Colon or Uterine Cancer: No     If YES see scanned worksheet. Preventative Health Testing:    Health Maintenance:  Health Maintenance Due   Topic Date Due    Pneumococcal 0-64 years Vaccine (1 of 1 - PPSV23) 08/23/1992    Varicella Vaccine (1 of 2 - 13+ 2-dose series) 08/23/1999    DTaP/Tdap/Td vaccine (1 - Tdap) 08/23/2005    Cervical cancer screen  02/16/2019       Date of Last Pap Smear: 2/16/2018 ASCUS/+HPV - did not complete recommended colposcopy  Abnormal Pap Smear History: yes  Colposcopy History:   Date of Last Mammogram: NA  Date of Last Colonoscopy:   Date of Last Bone Density:      ________________________________________________________________________        REVIEW OF SYSTEMS:    yes   A minimum of an eleven point review of systems was completed. Review Of Systems (11 point):  Constitutional: No fever, chills or malaise; No weight change or fatigue  Head and Eyes: No vision, Headache, Dizziness or trauma in last 12 months  ENT ROS: No hearing, Tinnitis, sinus or taste problems  Hematological and Lymphatic ROS:No Lymphoma, Von Willebrand's, Hemophillia or Bleeding History  Psych ROS: No Depression, Homicidal thoughts,suicidal thoughts, or anxiety  Breast ROS: No prior breast abnormalities or lumps  Respiratory ROS: No SOB, Pneumoniae,Cough, or Pulmonary Embolism History  Cardiovascular ROS: No Chest Pain with Exertion, Palpitations, Syncope, Edema, Arrhythmia  Gastrointestinal ROS: No Indigestion, Heartburn, Nausea, vomiting, Diarrhea, Constipation,or Bowel Changes; No Bloody Stools or melena  Genito-Urinary ROS: No Dysuria, Hematuria or Nocturia.  No Urinary Incontinence or Vaginal Discharge  Musculoskeletal ROS: No Arthralgia, Arthritis,Gout,Osteoporosis or Rheumatism  Neurological ROS: No CVA, Migraines, Epilepsy, Seizure Hx, or Limb Weakness  Dermatological ROS: No Rash, Itching, Hives, Mole Changes or Cancer PHYSICAL Exam:     Constitutional:  Vitals:    06/03/19 1607   BP: 112/84   Site: Left Upper Arm   Position: Sitting   Cuff Size: Medium Adult   Weight: 211 lb (95.7 kg)   Height: 5' 5\" (1.651 m)         General Appearance: This  is a well Developed, well Nourished, well groomed female. Her BMI was reviewed. Nutritional decision making was discussed. Skin:  There was a Normal Inspection of the skin without rashes or lesions. There were no rashes. (Papular, Maculopapular, Hives, Pustular, Macular)     There were no lesions (Ulcers, Erythema, Abn. Appearing Nevi)            Lymphatic:  No Lymph Nodes were Palpable in the neck , axilla or groin.  0 # Of Lymph Nodes; Location ; Character [Normal]  [Shotty] [Tender] [Enlarged]     Neck and EENT:  The neck was supple. There were no masses   The thyroid was not enlarged and had no masses. Perrla, EOMI B/L, TMI B/L No Abnormalities. Throat inspected-No exudates or Masses, Nares Patent No Masses        Respiratory: The lungs were auscultated and found to be clear. There were no rales, rhonchi or wheezes. There was a good respiratory effort. Cardiovascular: The heart was in a regular rate and rhythm. . No S3 or S4. There was no murmur appreciated. Location, grade, and radiation are not applicable. Extremities: The patients extremities were without calf tenderness, edema, or varicosities. There was full range of motion in all four extremities. Pulses in all four extremities were appreciated and are 2/4. Abdomen: The abdomen was soft and non-tender. There were good bowel sounds in all quadrants and there was no guarding, rebound or rigidity.   On evaluation there was no evidence of hepatosplenomegaly and there was no costal vertebral adeline tenderness bilaterally. No hernias were appreciated. Abdominal Scars: intact    Psych: The patient had a normal Orientation to: Time, Place, Person, and Situation  There is no Mood / Affect changes    Breast:  (Chest)  normal appearance, no masses or tenderness  Self breast exams were reviewed in detail. Literature was given. Pelvic Exam:  Vulva and vagina appear normal. Bimanual exam reveals normal uterus and adnexa. Rectal Exam:  exam declined by patient          Musculosk:  Normal Gait and station was noted. Digits were evaluated without abnormal findings. Range of motion, stability and strength were evaluated and found to be appropriate for the patients age. ASSESSMENT:      29 y.o. Annual   Diagnosis Orders   1. Well woman exam  PAP SMEAR   2. Screening examination for STD (sexually transmitted disease)  VAGINITIS DNA PROBE    C.trachomatis N.gonorrhoeae DNA   3. Screening for HPV (human papillomavirus)            No chief complaint on file.          Past Medical History:   Diagnosis Date    Alcohol abuse 2/8/2017    Atypical squamous cell changes of undetermined significance (ASCUS) on cervical cytology with positive high risk human papilloma virus (HPV) 1/5/16    Chlamydia 10/9/12    Depressive disorder 06/06/2013    is doing well now, no longer on medication    Generalized anxiety disorder     History of anxiety     HPV in female 1/2016    Mixed anxiety and depressive disorder     Seasonal allergies     Strep throat     Tonsillitis 08/2018         Patient Active Problem List    Diagnosis Date Noted    Vaginitis 10/09/2012     Priority: High    Intractable headache 02/18/2019    Epigastric pain 01/14/2019    History of alcohol abuse 01/14/2019    History of cholecystectomy 01/14/2019    Plantar fascial fibromatosis     Pain in right foot     Acute sinusitis 07/16/2018    Headache 07/16/2018    Irregular periods 07/16/2018    Mixed anxiety and depressive disorder 07/16/2018    Acute pancreatitis 08/22/2017    Cholelithiasis and cholecystitis without obstruction 08/22/2017    Amenorrhea 03/22/2017    Alcohol abuse 02/08/2017    Tinea corporis 02/08/2017    ASCUS with positive high risk HPV cervical 02/26/2016    Atypical squamous cell changes of undetermined significance (ASCUS) on cervical cytology with positive high risk human papilloma virus (HPV) 01/05/2016    HPV in female 01/01/2016    Microscopic hematuria 04/16/2014    Disorder of lung 03/19/2014    Calculus of gallbladder with cholecystitis 03/17/2014    Acute pharyngitis 03/05/2014    Migraine without aura 09/03/2013    Depressive disorder 06/06/2013    Generalized anxiety disorder 06/06/2013    Overweight 06/06/2013    Chlamydia           Hereditary Breast, Ovarian, Colon and Uterine Cancer screening Done. Tobacco & Secondary smoke risks reviewed; instructed on cessation and avoidance      Counseling Completed:  Preventative Health Recommendations and Follow up. The patient was informed of the recommended preventative health recommendations. 1. Annuals every year; Cytology collections per prevailing guidelines. 2. Mammograms begin every year at 37 yo if no abnormalities are found and no family     History. 3. Bone density studies every 2-3 years. Begin at 73 yo. If no fracture history or osteoporosis family history. (significant). 4. Colonoscopy begin at 38 yo. Repeat every ten years if negative and no family history. 5. Calcium of 3677-4944 mg/day in split dosing  6. Vitamin D 400-800 IU/day  7. All other preventative health recommendations will be managed by the patients Primary care physician. Counseling Hormonal Based Birth Control:      The patient was seen and counseled on all forms of birth control both male and female  reversible and non.  She is aware that hormonal based birth control may increase her risk of developing a blood clot which may increase her morbidity and or mortality. She was counseled on alternate non hormonal based contraception options. We discussed that smoking and any hormonal based contraception may increase the patients risks of developing these life threatening blood clots. All patients are encouraged to stop smoking at the time of contraceptive counseling. Cessation programs were reviewed. The patient was instructed to use barrier contraception for sexually transmitted disease prevention. The patient was also informed of antibiotics decreasing contraceptive efficacy and the need for barrier contraception from the onset of her antibiotic dosing and through a minimum of thirty days from antibiotic cessation. The life threatening side effect profile was reviewed in detail this includes but is not limited to shortness of breath, chest pain, severe or persistent headaches, or calf pain. If any of these occur the patient has been instructed to stop using her hormonal based contraception, notify the office, and go to the emergency department or call 911. The patient denied any personal history of blood clots in her leg, lung, or heart and denied any family history of stroke, TIA, sudden cardiac death < 36 y.o.,pulmonary embolism, or deep venous thrombosis. PLAN:  Return in about 3 months (around 9/3/2019) for follow up on OCPs. Pap smear and vaginal cultures obtained. Call for results. Patient denies any personal or family history of blood clots or sudden cardiac death prior to age 36. Signs hormonal birth control consent form  Prescription for Loestrin Fe 1/20 sent to pharmacy. Repeat Annual every 1 year  Cervical Cytology Evaluation begins at 24years old. If Negative Cytology, Follow-up screening per current guidelines. Mammograms every 1 year. If 35 yo and last mammogram was negative. Calcium and Vitamin D dosing reviewed. Colonoscopy screening reviewed as well as onset for bone density testing.   Birth control and barrier recommendations discussed. STD counseling and prevention reviewed. Gardisil counseling completed for all patients 10-37 yo. Routine health maintenance per patients PCP. Orders Placed This Encounter   Procedures    VAGINITIS DNA PROBE    C.trachomatis N.gonorrhoeae DNA    PAP SMEAR     Patient History:    Patient's last menstrual period was 05/17/2019. OBGYN Status: Other - See Notes  Past Surgical History:  No date: CHOLECYSTECTOMY  09/07/2018: PLANTAR FASCIA SURGERY; Right      Comment:  PLANTAR FASCIOTOMY RIGHT AND PRP INJECTION RIGHT FOOT -                CELLSAVER  9/7/2018: [place] INCISION OF FOOT/TOE FASCIA; Right      Comment:  PLANTAR FASCIOTOMY RIGHT AND PRP INJECTION RIGHT FOOT -                CELLSAVER performed by Krish Santiago DPM at [address] [facility]  Medications/Contraceptives Affecting Cytology     Combination Contraceptives - Oral Disp Start End     norethindrone-ethinyl estradiol (1110 Dominique MENCHACA 1/20) 1-20 MG-MCG per   tablet    1 packet 6/3/2019 7/1/2019    Sig: Take 1 tablet by mouth daily for 28 days    Route: Oral     Problem List       Edg Problems Affecting Cytology    HPV in female     Social History    Tobacco Use      Smoking status: Never Smoker      Smokeless tobacco: Never Used       Standing Status:   Future     Standing Expiration Date:   6/2/2020     Order Specific Question:   Collection Type     Answer: Thin Prep     Order Specific Question:   Prior Abnormal Pap Test     Answer:   No     Order Specific Question:   Screening or Diagnostic     Answer:   Screening     Order Specific Question:   HPV Requested?      Answer:   Yes     Order Specific Question:   High Risk Patient     Answer:   N/A

## 2019-06-04 ENCOUNTER — TELEPHONE (OUTPATIENT)
Dept: OBGYN CLINIC | Age: 34
End: 2019-06-04

## 2019-06-04 LAB
C TRACH DNA GENITAL QL NAA+PROBE: NEGATIVE
N. GONORRHOEAE DNA: NEGATIVE
SPECIMEN DESCRIPTION: NORMAL

## 2019-06-04 RX ORDER — METRONIDAZOLE 500 MG/1
500 TABLET ORAL 2 TIMES DAILY
Qty: 14 TABLET | Refills: 0 | Status: SHIPPED | OUTPATIENT
Start: 2019-06-04 | End: 2019-06-11

## 2019-06-04 NOTE — TELEPHONE ENCOUNTER
Attempted to reach patient in regards to + BV and Flagyl 500 mg # 14 BID po x 7 days, as indicated by Anjali Singh.  Patient did not answer, voicemail left, awaiting call back.

## 2019-06-04 NOTE — TELEPHONE ENCOUNTER
Patient returned call related to + BV and   Flagyl 500 mg # 14 BID po x 7 days, as indicated by Kortney Lux.  Patient verbalized an understanding of test results and plan of care.

## 2019-06-04 NOTE — TELEPHONE ENCOUNTER
----- Message from Heidi Bryant, Macie Oconnell sent at 6/4/2019  8:21 AM EDT -----  + BV  Flagyl 500 mg # 14 BID po x 7 days

## 2019-06-04 NOTE — TELEPHONE ENCOUNTER
----- Message from Jaycob Adams, Macie Oconnell sent at 6/4/2019  8:21 AM EDT -----  + BV  Flagyl 500 mg # 14 BID po x 7 days

## 2019-06-05 LAB
HPV SAMPLE: NORMAL
HPV, GENOTYPE 16: NOT DETECTED
HPV, GENOTYPE 18: NOT DETECTED
HPV, HIGH RISK OTHER: NOT DETECTED
HPV, INTERPRETATION: NORMAL
SPECIMEN DESCRIPTION: NORMAL

## 2019-06-06 LAB — CYTOLOGY REPORT: NORMAL

## 2019-06-07 ENCOUNTER — TELEPHONE (OUTPATIENT)
Dept: OBGYN CLINIC | Age: 34
End: 2019-06-07

## 2019-06-10 ENCOUNTER — TELEPHONE (OUTPATIENT)
Dept: OBGYN CLINIC | Age: 34
End: 2019-06-10

## 2019-06-10 NOTE — TELEPHONE ENCOUNTER
----- Message from NAHUN Bunch CNP sent at 6/10/2019 10:13 AM EDT -----  Pap smear neg  HPV not detected  AGE 33  Discussed results with Dr Arcadio Reyes. Repeat pap smear in 6 months- 1year. Please call patient and go over results and schedule follow up pap smear.

## 2019-06-17 ENCOUNTER — OFFICE VISIT (OUTPATIENT)
Dept: PODIATRY | Age: 34
End: 2019-06-17
Payer: COMMERCIAL

## 2019-06-17 VITALS — HEIGHT: 65 IN | BODY MASS INDEX: 35.32 KG/M2 | WEIGHT: 212 LBS

## 2019-06-17 DIAGNOSIS — M79.672 PAIN IN LEFT FOOT: Primary | ICD-10-CM

## 2019-06-17 DIAGNOSIS — M72.2 PLANTAR FASCIAL FIBROMATOSIS: ICD-10-CM

## 2019-06-17 PROBLEM — R19.7 DIARRHEA: Status: ACTIVE | Noted: 2019-04-15

## 2019-06-17 PROBLEM — Z34.90 PREGNANT: Status: ACTIVE | Noted: 2019-04-15

## 2019-06-17 PROBLEM — A08.4 VIRAL GASTROENTERITIS: Status: ACTIVE | Noted: 2019-04-15

## 2019-06-17 PROBLEM — Z33.2 ELECTIVE ABORTION: Status: ACTIVE | Noted: 2019-04-15

## 2019-06-17 PROCEDURE — 99213 OFFICE O/P EST LOW 20 MIN: CPT | Performed by: PODIATRIST

## 2019-06-17 PROCEDURE — 20550 NJX 1 TENDON SHEATH/LIGAMENT: CPT | Performed by: PODIATRIST

## 2019-06-17 RX ORDER — FLUTICASONE PROPIONATE 50 MCG
SPRAY, SUSPENSION (ML) NASAL
Refills: 0 | COMMUNITY
Start: 2019-06-06 | End: 2020-02-24

## 2019-06-17 RX ORDER — BETAMETHASONE SODIUM PHOSPHATE AND BETAMETHASONE ACETATE 3; 3 MG/ML; MG/ML
6 INJECTION, SUSPENSION INTRA-ARTICULAR; INTRALESIONAL; INTRAMUSCULAR; SOFT TISSUE ONCE
Status: DISCONTINUED | OUTPATIENT
Start: 2019-06-17 | End: 2020-06-23

## 2019-06-17 NOTE — PROGRESS NOTES
[facility] [id number] [facility]  [address]  Dept: [phone]    RETURN PATIENT PROGRESS NOTE  Date of patient's visit: 6/17/2019  Patient's Name:  Tio Ochoa YOB: 1985            Patient Care Team:  Alyssa Larkin MD as PCP - General (Family Medicine)  Liz Bains DO as Consulting Physician (Obstetrics & Gynecology)  Sharlet Primrose, DPM as Consulting Physician (Podiatry)       Tio Ochoa 29 y.o. female that presents for follow-up of   Chief Complaint   Patient presents with    Foot Pain     left foot     Pt's primary care physician is Merly Becerril M.D., MD last seen April 15 2019  Symptoms began 5 month(s) ago and are increased . Patient relates pain is Present. Pain is rated 8 out of 10 and is described as constant. Treatments prior to today's visit include: previous podiatry treatment/cortisone injections. Currently denies F/C/N/V. 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  Authorizing Provider   fluticasone (FLONASE) 50 MCG/ACT nasal spray instill 2 sprays into each nostril once daily 6/6/19  Yes Historical Provider, MD   norethindrone-ethinyl estradiol (1110 Domin[address] MENCHACA 1/20) 1-20 MG-MCG per tablet Take 1 tablet by mouth daily for 28 days 6/3/19 7/1/19 Melony Kimble, APRN - CNP   ondansetron (ZOFRAN ODT) 4 MG disintegrating tablet Take 1 tablet by mouth every 8 hours as needed for Nausea 4/15/19   Eliana Guerrero MD   ibuprofen (ADVIL;MOTRIN) 800 MG tablet Take 1 tablet by mouth every 8 hours as needed for Pain 9/12/18 4/15/19  Krish Santiago DPM   NAPROXEN PO Take 200 mg by mouth 2 times daily as needed    Historical Provider, MD       Review of Systems    Review of Systems:  History obtained from chart review and the patient  General ROS: negative for - chills, fatigue, fever, night sweats or weight gain  Constitutional: Negative for chills, diaphoresis, fatigue, fever and unexpected weight change. Musculoskeletal: Positive for arthralgias, gait problem and joint swelling. Neurological ROS: negative for - behavioral changes, confusion, headaches or seizures. Negative for weakness and numbness. Dermatological ROS: negative for - mole changes, rash  Cardiovascular: Negative for leg swelling. Gastrointestinal: Negative for constipation, diarrhea, nausea and vomiting. Lower Extremity Physical Examination:     Vitals: There were no vitals filed for this visit. General: AAO x 3 in NAD. Dermatologic Exam:  Skin lesion/ulceration Absent . Skin No rashes or nodules noted. .       Musculoskeletal:     1st MPJ ROM decreased, Bilateral.  Muscle strength 5/5, Bilateral.  POP of the left plantar medial calcaneal tuberosity. Pain increased with Dorsiflexion of the right and left lesser toes. Negative pain on compression of the calcaneus bilaterally Medial longitudinal arch, Bilateral WNL.   Ankle ROM WNL,Bilateral.    Dorsally contracted digits absent digits 1-5 Bilateral.     Vascular: DP and PT pulses palpable 2/4, Bilateral.  CFT <3 seconds, Bilateral.  Hair growth present to the level of the digits, Bilateral.  Edema absent, Bilateral.  Varicosities absent, Bilateral. Erythema absent, Bilateral    Neurological: Sensation intact to light touch to level of digits, Bilateral.  Protective sensation intact 10/10 sites via 5.07/10g Solsberry-Vasyl Monofilament, Bilateral.  negative Tinel's, Bilateral.  negative Valleix sign, Bilateral.      Integument: Warm, dry, supple, Bilateral.  Open lesion absent, Bilateral.  Interdigital maceration absent to web spaces 1-4, Bilateral.  Nails are normal in length, thickness and color 1-5 bilateral.  Fissures absent, Bilateral.       Asessment: Patient is a 29 y.o. female with:    Diagnosis Orders   1. Pain in left foot  97146 - OK INJECT TENDON SHEATH/LIGAMENT   2. Plantar fascial fibromatosis  97289 - [place] INJECT TENDON SHEATH/LIGAMENT         Plan: Patient examined and evaluated. Current condition and treatment options discussed in detail. Advised pt to her condiiton. After obtaining verbal consent from the patient the left medial calcaneus was cleansed with an isopropyl alcohol swab. Topical ethyl chloride was then applied to the site. And injection was informed consisting of a 3:1 mix of 0.5% Marcaine plain and dexamethasone phosphate/dexamethasone acetate (6MG/ML). The patient was educated about the possibility of steroid flair. The patient is to call any questions, comments, or concerns. .  Verbal and written instructions given to patient. Contact office with any questions/problems/concerns. No orders of the defined types were placed in this encounter. No orders of the defined types were placed in this encounter. RTC in 2week(s).     6/17/2019      Electronically signed by Celia Rice DPM on 6/17/2019 at 3:27 PM  6/17/2019

## 2019-06-27 ENCOUNTER — OFFICE VISIT (OUTPATIENT)
Dept: PODIATRY | Age: 34
End: 2019-06-27
Payer: COMMERCIAL

## 2019-06-27 VITALS — HEIGHT: 65 IN | WEIGHT: 212 LBS | RESPIRATION RATE: 16 BRPM | BODY MASS INDEX: 35.32 KG/M2

## 2019-06-27 DIAGNOSIS — M72.2 PLANTAR FASCIAL FIBROMATOSIS: ICD-10-CM

## 2019-06-27 DIAGNOSIS — M79.672 PAIN IN LEFT FOOT: Primary | ICD-10-CM

## 2019-06-27 PROCEDURE — 99213 OFFICE O/P EST LOW 20 MIN: CPT | Performed by: PODIATRIST

## 2019-06-27 RX ORDER — TRAMADOL HYDROCHLORIDE 50 MG/1
50 TABLET ORAL EVERY 6 HOURS PRN
Qty: 30 TABLET | Refills: 0 | Status: SHIPPED | OUTPATIENT
Start: 2019-06-27 | End: 2019-07-04

## 2019-06-27 RX ORDER — PREDNISONE 10 MG/1
TABLET ORAL
Qty: 20 TABLET | Refills: 0 | Status: SHIPPED | OUTPATIENT
Start: 2019-06-27 | End: 2019-09-03

## 2019-06-27 NOTE — LETTER
[facility] [address] [address]  Phone: [phone]  Fax: [phone]    Dina Ruiz        June 27, 6543     Patient: Patsy Collier   YOB: 1985   Date of Visit: 6/27/2019       To Whom It May Concern: It is my medical opinion that Patsy Collier is unable to work from 6/28/2019 until 7/14/2019. May return to work on 7/15/2019    If you have any questions or concerns, please don't hesitate to call.     Sincerely,        Girish Gallagher DPM

## 2019-07-11 ENCOUNTER — OFFICE VISIT (OUTPATIENT)
Dept: PODIATRY | Age: 34
End: 2019-07-11
Payer: COMMERCIAL

## 2019-07-11 VITALS — WEIGHT: 212 LBS | BODY MASS INDEX: 33.27 KG/M2 | RESPIRATION RATE: 16 BRPM | HEIGHT: 67 IN

## 2019-07-11 DIAGNOSIS — M72.2 PLANTAR FASCIAL FIBROMATOSIS: ICD-10-CM

## 2019-07-11 DIAGNOSIS — M62.9 NONTRAUMATIC TEAR OF PLANTAR FASCIA: ICD-10-CM

## 2019-07-11 DIAGNOSIS — M79.672 PAIN IN LEFT FOOT: Primary | ICD-10-CM

## 2019-07-11 PROCEDURE — 99213 OFFICE O/P EST LOW 20 MIN: CPT | Performed by: PODIATRIST

## 2019-07-11 RX ORDER — IBUPROFEN 800 MG/1
800 TABLET ORAL EVERY 8 HOURS PRN
Qty: 90 TABLET | Refills: 2 | Status: SHIPPED | OUTPATIENT
Start: 2019-07-11 | End: 2019-09-03

## 2019-07-11 NOTE — LETTER
[facility] [address] [address]  Phone: [phone]  Fax: [phone]    Kavon Riccardo        July 11, 0852     Patient: Sangita Merritt   YOB: 1985   Date of Visit: 7/11/2019       To Whom It May Concern: It is my medical opinion that Sangita Merritt  Is unable to work from 6/28/19 to 7/21/2019. May return to work on 7/22/2019  If you have any questions or concerns, please don't hesitate to call.     Sincerely,        Yesica Thurman DPM

## 2019-08-21 ENCOUNTER — OFFICE VISIT (OUTPATIENT)
Dept: PODIATRY | Age: 34
End: 2019-08-21
Payer: COMMERCIAL

## 2019-08-21 VITALS — HEIGHT: 65 IN | WEIGHT: 210 LBS | BODY MASS INDEX: 34.99 KG/M2

## 2019-08-21 DIAGNOSIS — M79.672 PAIN IN LEFT FOOT: Primary | ICD-10-CM

## 2019-08-21 DIAGNOSIS — M76.72 PERONEAL TENDONITIS OF LEFT LOWER EXTREMITY: ICD-10-CM

## 2019-08-21 DIAGNOSIS — M72.2 PLANTAR FASCIAL FIBROMATOSIS: ICD-10-CM

## 2019-08-21 PROCEDURE — 99213 OFFICE O/P EST LOW 20 MIN: CPT | Performed by: PODIATRIST

## 2019-08-21 RX ORDER — CITALOPRAM 10 MG/1
TABLET ORAL
COMMUNITY
End: 2019-09-03

## 2019-08-21 RX ORDER — METRONIDAZOLE 500 MG/1
TABLET ORAL
COMMUNITY
End: 2019-09-03

## 2019-08-21 RX ORDER — METHYLPREDNISOLONE 4 MG/1
TABLET ORAL
Qty: 1 KIT | Refills: 0 | Status: SHIPPED | OUTPATIENT
Start: 2019-08-21 | End: 2019-08-27

## 2019-08-21 NOTE — PROGRESS NOTES
citalopram 10 mg tablet   Yes Historical Provider, MD   predniSONE (DELTASONE) 10 MG tablet Take one PO TID x 3days Take one PO BID x 3days Take one PO qdaily x 3days Take 1/2 PO q daily  x 4days 6/27/19  Yes Ca Leija DPM   fluticasone [facility]) 50 MCG/ACT nasal spray instill 2 sprays into each nostril once daily 6/6/19  Yes Historical Provider, MD   ondansetron (ZOFRAN ODT) 4 MG disintegrating tablet Take 1 tablet by mouth every 8 hours as needed for Nausea 4/15/19  Yes Sally Adams MD   NAPROXEN PO Take 200 mg by mouth 2 times daily as needed   Yes Historical Provider, MD   ibuprofen (ADVIL;MOTRIN) 800 MG tablet Take 1 tablet by mouth every 8 hours as needed for Pain 7/11/19 8/10/19  Ca Leija DPM   norethindrone-ethinyl estradiol (LOESTRIN FE 1/20) 1-20 MG-MCG per tablet Take 1 tablet by mouth daily for 28 days 6/3/19 7/1/19  NAHUN Patel - CNP   ibuprofen (ADVIL;MOTRIN) 800 MG tablet Take 1 tablet by mouth every 8 hours as needed for Pain 9/12/18 4/15/19  Ca Leija DPM       Review of Systems    Review of Systems:  History obtained from chart review and the patient  General ROS: negative for - chills, fatigue, fever, night sweats or weight gain  Constitutional: Negative for chills, diaphoresis, fatigue, fever and unexpected weight change. Musculoskeletal: Positive for arthralgias, gait problem and joint swelling. Neurological ROS: negative for - behavioral changes, confusion, headaches or seizures. Negative for weakness and numbness. Dermatological ROS: negative for - mole changes, rash  Cardiovascular: Negative for leg swelling. Gastrointestinal: Negative for constipation, diarrhea, nausea and vomiting. Lower Extremity Physical Examination:     Vitals: There were no vitals filed for this visit. General: AAO x 3 in NAD. Dermatologic Exam:  Skin lesion/ulceration Absent . Skin No rashes or nodules noted. .       Musculoskeletal:     1st MPJ ROM

## 2019-08-27 DIAGNOSIS — N92.6 MISSED PERIOD: Primary | ICD-10-CM

## 2019-09-03 ENCOUNTER — TELEPHONE (OUTPATIENT)
Dept: OBGYN CLINIC | Age: 34
End: 2019-09-03

## 2019-09-03 ENCOUNTER — HOSPITAL ENCOUNTER (OUTPATIENT)
Dept: PREADMISSION TESTING | Age: 34
Discharge: HOME | End: 2019-09-07
Payer: COMMERCIAL

## 2019-09-03 ENCOUNTER — HOSPITAL ENCOUNTER (OUTPATIENT)
Age: 34
Setting detail: SPECIMEN
Discharge: HOME | End: 2019-09-03
Payer: COMMERCIAL

## 2019-09-03 VITALS
WEIGHT: 219 LBS | HEIGHT: 65 IN | RESPIRATION RATE: 16 BRPM | HEART RATE: 83 BPM | DIASTOLIC BLOOD PRESSURE: 76 MMHG | OXYGEN SATURATION: 99 % | SYSTOLIC BLOOD PRESSURE: 124 MMHG | BODY MASS INDEX: 36.49 KG/M2

## 2019-09-03 LAB
ABSOLUTE EOS #: 0.19 K/UL (ref 0–0.44)
ABSOLUTE IMMATURE GRANULOCYTE: 0.07 K/UL (ref 0–0.3)
ABSOLUTE LYMPH #: 2.46 K/UL (ref 1.1–3.7)
ABSOLUTE MONO #: 0.64 K/UL (ref 0.1–1.2)
BASOPHILS # BLD: 0 % (ref 0–2)
BASOPHILS ABSOLUTE: 0.04 K/UL (ref 0–0.2)
DIFFERENTIAL TYPE: ABNORMAL
EKG ATRIAL RATE: 72 BPM
EKG P AXIS: 52 DEGREES
EKG P-R INTERVAL: 148 MS
EKG Q-T INTERVAL: 360 MS
EKG QRS DURATION: 84 MS
EKG QTC CALCULATION (BAZETT): 394 MS
EKG R AXIS: 20 DEGREES
EKG T AXIS: 26 DEGREES
EKG VENTRICULAR RATE: 72 BPM
EOSINOPHILS RELATIVE PERCENT: 2 % (ref 1–4)
HCG QUANTITATIVE: <1 IU/L
HCT VFR BLD CALC: 41.5 % (ref 36.3–47.1)
HEMOGLOBIN: 13 G/DL (ref 11.9–15.1)
IMMATURE GRANULOCYTES: 1 %
LYMPHOCYTES # BLD: 28 % (ref 24–43)
MCH RBC QN AUTO: 25.3 PG (ref 25.2–33.5)
MCHC RBC AUTO-ENTMCNC: 31.3 G/DL (ref 28.4–34.8)
MCV RBC AUTO: 80.9 FL (ref 82.6–102.9)
MONOCYTES # BLD: 7 % (ref 3–12)
NRBC AUTOMATED: 0 PER 100 WBC
PDW BLD-RTO: 14.3 % (ref 11.8–14.4)
PLATELET # BLD: 342 K/UL (ref 138–453)
PLATELET ESTIMATE: ABNORMAL
PMV BLD AUTO: 10.6 FL (ref 8.1–13.5)
RBC # BLD: 5.13 M/UL (ref 3.95–5.11)
RBC # BLD: ABNORMAL 10*6/UL
SEG NEUTROPHILS: 62 % (ref 36–65)
SEGMENTED NEUTROPHILS ABSOLUTE COUNT: 5.53 K/UL (ref 1.5–8.1)
WBC # BLD: 8.9 K/UL (ref 3.5–11.3)
WBC # BLD: ABNORMAL 10*3/UL

## 2019-09-03 PROCEDURE — 93005 ELECTROCARDIOGRAM TRACING: CPT | Performed by: ANESTHESIOLOGY

## 2019-09-03 PROCEDURE — 85025 COMPLETE CBC W/AUTO DIFF WBC: CPT

## 2019-09-03 PROCEDURE — 36415 COLL VENOUS BLD VENIPUNCTURE: CPT

## 2019-09-03 PROCEDURE — 84702 CHORIONIC GONADOTROPIN TEST: CPT

## 2019-09-03 ASSESSMENT — PAIN DESCRIPTION - ONSET: ONSET: ON-GOING

## 2019-09-03 ASSESSMENT — PAIN DESCRIPTION - FREQUENCY: FREQUENCY: CONTINUOUS

## 2019-09-03 ASSESSMENT — PAIN DESCRIPTION - PAIN TYPE: TYPE: CHRONIC PAIN

## 2019-09-03 ASSESSMENT — PAIN DESCRIPTION - LOCATION: LOCATION: FOOT

## 2019-09-03 ASSESSMENT — PAIN DESCRIPTION - ORIENTATION: ORIENTATION: LEFT

## 2019-09-03 ASSESSMENT — PAIN DESCRIPTION - PROGRESSION: CLINICAL_PROGRESSION: NOT CHANGED

## 2019-09-03 ASSESSMENT — PAIN DESCRIPTION - DESCRIPTORS: DESCRIPTORS: CONSTANT;SHARP

## 2019-09-03 ASSESSMENT — PAIN SCALES - GENERAL: PAINLEVEL_OUTOF10: 5

## 2019-09-03 NOTE — TELEPHONE ENCOUNTER
Patient made aware of negative blood pregnancy test as requested by Nba ARDON. Patient verbalized an understanding of results.

## 2019-09-03 NOTE — H&P
palpitations. GASTROINTESTINAL: Acid reflux. Negative for nausea, vomiting, diarrhea, constipation, change in bowel habits, and abdominal pain. GENITOURINARY: Negative for difficulty of urination, burning with urination, and frequency. INTEGUMENT: Negative for rash, skin lesions, and easy bruising. HEMATOLOGIC/LYMPHATIC:  Negative for swelling/edema. ALLERGIC/IMMUNOLOGIC:  Negative for urticaria and itching. ENDOCRINE: Negative for diabetes, increase in thirst, increase in urination, and heat or cold intolerance. MUSCULOSKELETAL: See HPI. NEUROLOGICAL: Headaches. Infrequent migraines. Negative for dizziness, lightheadedness, numbness, and tingling extremities. BEHAVIOR/PSYCH: Anxiety and depression. Pt states depression is under control. Physical Exam:   /76   Pulse 83   Resp 16   Ht 5' 5\" (1.651 m)   Wt 219 lb (99.3 kg)   SpO2 99%   BMI 36.44 kg/m²   No LMP recorded. (Menstrual status: Irregular periods). LMP was 07/2019. No results for input(s): POCGLU in the last 72 hours. General Appearance:  Alert, well appearing, and in no acute distress. Obese. Mental status:  Oriented to person, place, and time. Head:  Normocephalic and atraumatic. Eye:  No icterus, redness, pupils equal and reactive, extraocular eye movements intact, and conjunctiva clear. Ear:  Hearing grossly intact. Nose:  No drainage noted. Mouth:  Mucous membranes moist.  Neck:  Supple and no carotid bruits noted. Lungs:  Bilateral equal air entry, clear to auscultation, no wheezing, rales or rhonchi, and normal effort. Cardiovascular:  Normal rate, regular rhythm, no murmur, gallop, or rub. Abdomen:  Soft, non-tender, non-distended, and active bowel sounds. Neurologic:  Normal speech and cranial nerves II through XII grossly intact. Strength 5/5 bilaterally. Skin:  No gross lesions, rashes, bruising, or bleeding on exposed skin area. Extremities:  Posterior tibial pulses 2+ bilaterally. No pedal edema.

## 2019-09-12 ENCOUNTER — ANESTHESIA EVENT (OUTPATIENT)
Dept: OPERATING ROOM | Age: 34
End: 2019-09-12
Payer: COMMERCIAL

## 2019-09-13 ENCOUNTER — ANESTHESIA (OUTPATIENT)
Dept: OPERATING ROOM | Age: 34
End: 2019-09-13
Payer: COMMERCIAL

## 2019-09-13 ENCOUNTER — HOSPITAL ENCOUNTER (OUTPATIENT)
Age: 34
Setting detail: OUTPATIENT SURGERY
Discharge: HOME | End: 2019-09-13
Attending: PODIATRIST | Admitting: PODIATRIST
Payer: COMMERCIAL

## 2019-09-13 VITALS
DIASTOLIC BLOOD PRESSURE: 68 MMHG | SYSTOLIC BLOOD PRESSURE: 120 MMHG | RESPIRATION RATE: 17 BRPM | OXYGEN SATURATION: 99 %

## 2019-09-13 VITALS
BODY MASS INDEX: 36.49 KG/M2 | RESPIRATION RATE: 21 BRPM | HEIGHT: 65 IN | HEART RATE: 96 BPM | TEMPERATURE: 96.8 F | SYSTOLIC BLOOD PRESSURE: 140 MMHG | WEIGHT: 219 LBS | DIASTOLIC BLOOD PRESSURE: 75 MMHG | OXYGEN SATURATION: 100 %

## 2019-09-13 DIAGNOSIS — G89.18 POST-OP PAIN: Primary | ICD-10-CM

## 2019-09-13 DIAGNOSIS — Z98.890 POST-OPERATIVE STATE: ICD-10-CM

## 2019-09-13 LAB — HCG, PREGNANCY URINE (POC): NEGATIVE

## 2019-09-13 PROCEDURE — 2500000003 HC RX 250 WO HCPCS: Performed by: NURSE ANESTHETIST, CERTIFIED REGISTERED

## 2019-09-13 PROCEDURE — 6370000000 HC RX 637 (ALT 250 FOR IP): Performed by: ANESTHESIOLOGY

## 2019-09-13 PROCEDURE — 6360000002 HC RX W HCPCS: Performed by: ANESTHESIOLOGY

## 2019-09-13 PROCEDURE — 3700000001 HC ADD 15 MINUTES (ANESTHESIA): Performed by: PODIATRIST

## 2019-09-13 PROCEDURE — 28062 REMOVAL OF FOOT FASCIA: CPT | Performed by: PODIATRIST

## 2019-09-13 PROCEDURE — 81025 URINE PREGNANCY TEST: CPT

## 2019-09-13 PROCEDURE — 2580000003 HC RX 258: Performed by: ANESTHESIOLOGY

## 2019-09-13 PROCEDURE — 3600000012 HC SURGERY LEVEL 2 ADDTL 15MIN: Performed by: PODIATRIST

## 2019-09-13 PROCEDURE — 7100000010 HC PHASE II RECOVERY - FIRST 15 MIN: Performed by: PODIATRIST

## 2019-09-13 PROCEDURE — 3600000002 HC SURGERY LEVEL 2 BASE: Performed by: PODIATRIST

## 2019-09-13 PROCEDURE — 6360000002 HC RX W HCPCS: Performed by: PODIATRIST

## 2019-09-13 PROCEDURE — 6360000002 HC RX W HCPCS: Performed by: NURSE ANESTHETIST, CERTIFIED REGISTERED

## 2019-09-13 PROCEDURE — 7100000001 HC PACU RECOVERY - ADDTL 15 MIN: Performed by: PODIATRIST

## 2019-09-13 PROCEDURE — 7100000000 HC PACU RECOVERY - FIRST 15 MIN: Performed by: PODIATRIST

## 2019-09-13 PROCEDURE — 7100000011 HC PHASE II RECOVERY - ADDTL 15 MIN: Performed by: PODIATRIST

## 2019-09-13 PROCEDURE — 2709999900 HC NON-CHARGEABLE SUPPLY: Performed by: PODIATRIST

## 2019-09-13 PROCEDURE — 2580000003 HC RX 258: Performed by: PODIATRIST

## 2019-09-13 PROCEDURE — 3700000000 HC ANESTHESIA ATTENDED CARE: Performed by: PODIATRIST

## 2019-09-13 PROCEDURE — 2500000003 HC RX 250 WO HCPCS: Performed by: PODIATRIST

## 2019-09-13 RX ORDER — FENTANYL CITRATE 50 UG/ML
INJECTION, SOLUTION INTRAMUSCULAR; INTRAVENOUS PRN
Status: DISCONTINUED | OUTPATIENT
Start: 2019-09-13 | End: 2019-09-13 | Stop reason: SDUPTHER

## 2019-09-13 RX ORDER — FENTANYL CITRATE 50 UG/ML
25 INJECTION, SOLUTION INTRAMUSCULAR; INTRAVENOUS EVERY 5 MIN PRN
Status: DISCONTINUED | OUTPATIENT
Start: 2019-09-13 | End: 2019-09-13 | Stop reason: HOSPADM

## 2019-09-13 RX ORDER — PROPOFOL 10 MG/ML
INJECTION, EMULSION INTRAVENOUS PRN
Status: DISCONTINUED | OUTPATIENT
Start: 2019-09-13 | End: 2019-09-13 | Stop reason: SDUPTHER

## 2019-09-13 RX ORDER — LIDOCAINE HYDROCHLORIDE 20 MG/ML
INJECTION, SOLUTION EPIDURAL; INFILTRATION; INTRACAUDAL; PERINEURAL PRN
Status: DISCONTINUED | OUTPATIENT
Start: 2019-09-13 | End: 2019-09-13 | Stop reason: SDUPTHER

## 2019-09-13 RX ORDER — BUPIVACAINE HYDROCHLORIDE 5 MG/ML
INJECTION, SOLUTION EPIDURAL; INTRACAUDAL PRN
Status: DISCONTINUED | OUTPATIENT
Start: 2019-09-13 | End: 2019-09-13 | Stop reason: ALTCHOICE

## 2019-09-13 RX ORDER — OXYCODONE HYDROCHLORIDE AND ACETAMINOPHEN 5; 325 MG/1; MG/1
1 TABLET ORAL
Status: COMPLETED | OUTPATIENT
Start: 2019-09-13 | End: 2019-09-13

## 2019-09-13 RX ORDER — KETAMINE HCL IN NACL, ISO-OSM 100MG/10ML
SYRINGE (ML) INJECTION PRN
Status: DISCONTINUED | OUTPATIENT
Start: 2019-09-13 | End: 2019-09-13 | Stop reason: SDUPTHER

## 2019-09-13 RX ORDER — ONDANSETRON 2 MG/ML
4 INJECTION INTRAMUSCULAR; INTRAVENOUS
Status: COMPLETED | OUTPATIENT
Start: 2019-09-13 | End: 2019-09-13

## 2019-09-13 RX ORDER — FENTANYL CITRATE 50 UG/ML
50 INJECTION, SOLUTION INTRAMUSCULAR; INTRAVENOUS EVERY 5 MIN PRN
Status: DISCONTINUED | OUTPATIENT
Start: 2019-09-13 | End: 2019-09-13 | Stop reason: HOSPADM

## 2019-09-13 RX ORDER — SODIUM CHLORIDE 0.9 % (FLUSH) 0.9 %
10 SYRINGE (ML) INJECTION EVERY 12 HOURS SCHEDULED
Status: DISCONTINUED | OUTPATIENT
Start: 2019-09-13 | End: 2019-09-13 | Stop reason: HOSPADM

## 2019-09-13 RX ORDER — HYDROCODONE BITARTRATE AND ACETAMINOPHEN 5; 325 MG/1; MG/1
1 TABLET ORAL EVERY 6 HOURS PRN
Qty: 28 TABLET | Refills: 0 | Status: SHIPPED | OUTPATIENT
Start: 2019-09-13 | End: 2019-09-13 | Stop reason: HOSPADM

## 2019-09-13 RX ORDER — SODIUM CHLORIDE 9 MG/ML
INJECTION, SOLUTION INTRAVENOUS CONTINUOUS
Status: DISCONTINUED | OUTPATIENT
Start: 2019-09-13 | End: 2019-09-13 | Stop reason: HOSPADM

## 2019-09-13 RX ORDER — SODIUM CHLORIDE, SODIUM LACTATE, POTASSIUM CHLORIDE, CALCIUM CHLORIDE 600; 310; 30; 20 MG/100ML; MG/100ML; MG/100ML; MG/100ML
INJECTION, SOLUTION INTRAVENOUS CONTINUOUS
Status: DISCONTINUED | OUTPATIENT
Start: 2019-09-13 | End: 2019-09-13 | Stop reason: HOSPADM

## 2019-09-13 RX ORDER — MIDAZOLAM HYDROCHLORIDE 1 MG/ML
INJECTION INTRAMUSCULAR; INTRAVENOUS PRN
Status: DISCONTINUED | OUTPATIENT
Start: 2019-09-13 | End: 2019-09-13 | Stop reason: SDUPTHER

## 2019-09-13 RX ORDER — SODIUM CHLORIDE 0.9 % (FLUSH) 0.9 %
10 SYRINGE (ML) INJECTION PRN
Status: DISCONTINUED | OUTPATIENT
Start: 2019-09-13 | End: 2019-09-13 | Stop reason: HOSPADM

## 2019-09-13 RX ORDER — OXYCODONE HYDROCHLORIDE AND ACETAMINOPHEN 5; 325 MG/1; MG/1
1 TABLET ORAL EVERY 6 HOURS PRN
Qty: 28 TABLET | Refills: 0 | Status: SHIPPED | OUTPATIENT
Start: 2019-09-13 | End: 2019-09-20

## 2019-09-13 RX ORDER — LIDOCAINE HYDROCHLORIDE 10 MG/ML
1 INJECTION, SOLUTION EPIDURAL; INFILTRATION; INTRACAUDAL; PERINEURAL
Status: DISCONTINUED | OUTPATIENT
Start: 2019-09-13 | End: 2019-09-13 | Stop reason: HOSPADM

## 2019-09-13 RX ORDER — LIDOCAINE HYDROCHLORIDE 10 MG/ML
INJECTION, SOLUTION EPIDURAL; INFILTRATION; INTRACAUDAL; PERINEURAL PRN
Status: DISCONTINUED | OUTPATIENT
Start: 2019-09-13 | End: 2019-09-13 | Stop reason: ALTCHOICE

## 2019-09-13 RX ORDER — PROPOFOL 10 MG/ML
INJECTION, EMULSION INTRAVENOUS CONTINUOUS PRN
Status: DISCONTINUED | OUTPATIENT
Start: 2019-09-13 | End: 2019-09-13 | Stop reason: SDUPTHER

## 2019-09-13 RX ADMIN — Medication 10 MG: at 07:49

## 2019-09-13 RX ADMIN — FENTANYL CITRATE 50 MCG: 50 INJECTION INTRAMUSCULAR; INTRAVENOUS at 08:44

## 2019-09-13 RX ADMIN — Medication 10 MG: at 08:00

## 2019-09-13 RX ADMIN — Medication 25 MCG: at 07:49

## 2019-09-13 RX ADMIN — PROPOFOL 20 MG: 10 INJECTION, EMULSION INTRAVENOUS at 07:35

## 2019-09-13 RX ADMIN — Medication 25 MCG: at 08:00

## 2019-09-13 RX ADMIN — MIDAZOLAM 2 MG: 1 INJECTION INTRAMUSCULAR; INTRAVENOUS at 07:27

## 2019-09-13 RX ADMIN — PROPOFOL 30 MG: 10 INJECTION, EMULSION INTRAVENOUS at 07:38

## 2019-09-13 RX ADMIN — OXYCODONE AND ACETAMINOPHEN 1 TABLET: 5; 325 TABLET ORAL at 09:44

## 2019-09-13 RX ADMIN — Medication 10 MG: at 08:22

## 2019-09-13 RX ADMIN — Medication 25 MCG: at 07:34

## 2019-09-13 RX ADMIN — LIDOCAINE HYDROCHLORIDE 100 MG: 20 INJECTION, SOLUTION EPIDURAL; INFILTRATION; INTRACAUDAL; PERINEURAL at 07:34

## 2019-09-13 RX ADMIN — Medication 10 MG: at 07:34

## 2019-09-13 RX ADMIN — SODIUM CHLORIDE, POTASSIUM CHLORIDE, SODIUM LACTATE AND CALCIUM CHLORIDE: 600; 310; 30; 20 INJECTION, SOLUTION INTRAVENOUS at 07:27

## 2019-09-13 RX ADMIN — PROPOFOL 100 MCG/KG/MIN: 10 INJECTION, EMULSION INTRAVENOUS at 07:32

## 2019-09-13 RX ADMIN — Medication 25 MCG: at 08:09

## 2019-09-13 RX ADMIN — SODIUM CHLORIDE, POTASSIUM CHLORIDE, SODIUM LACTATE AND CALCIUM CHLORIDE: 600; 310; 30; 20 INJECTION, SOLUTION INTRAVENOUS at 06:32

## 2019-09-13 RX ADMIN — Medication 10 MG: at 08:12

## 2019-09-13 RX ADMIN — PROPOFOL 30 MG: 10 INJECTION, EMULSION INTRAVENOUS at 07:49

## 2019-09-13 RX ADMIN — PROPOFOL 20 MG: 10 INJECTION, EMULSION INTRAVENOUS at 07:51

## 2019-09-13 RX ADMIN — ONDANSETRON 4 MG: 2 INJECTION INTRAMUSCULAR; INTRAVENOUS at 09:50

## 2019-09-13 RX ADMIN — OXYCODONE HYDROCHLORIDE AND ACETAMINOPHEN 1 TABLET: 5; 325 TABLET ORAL at 09:14

## 2019-09-13 RX ADMIN — FENTANYL CITRATE 50 MCG: 50 INJECTION INTRAMUSCULAR; INTRAVENOUS at 08:50

## 2019-09-13 RX ADMIN — PROPOFOL 30 MG: 10 INJECTION, EMULSION INTRAVENOUS at 07:34

## 2019-09-13 RX ADMIN — CEFAZOLIN 2 G: 10 INJECTION, POWDER, FOR SOLUTION INTRAVENOUS at 07:41

## 2019-09-13 ASSESSMENT — PULMONARY FUNCTION TESTS
PIF_VALUE: 1

## 2019-09-13 ASSESSMENT — PAIN SCALES - GENERAL
PAINLEVEL_OUTOF10: 8
PAINLEVEL_OUTOF10: 5
PAINLEVEL_OUTOF10: 8
PAINLEVEL_OUTOF10: 2
PAINLEVEL_OUTOF10: 8
PAINLEVEL_OUTOF10: 2
PAINLEVEL_OUTOF10: 8
PAINLEVEL_OUTOF10: 2
PAINLEVEL_OUTOF10: 2

## 2019-09-13 ASSESSMENT — PAIN DESCRIPTION - DESCRIPTORS
DESCRIPTORS: CONSTANT
DESCRIPTORS: CONSTANT

## 2019-09-13 ASSESSMENT — PAIN DESCRIPTION - LOCATION
LOCATION: FOOT
LOCATION: LEG

## 2019-09-13 ASSESSMENT — PAIN DESCRIPTION - ORIENTATION
ORIENTATION: LEFT
ORIENTATION: LEFT

## 2019-09-13 NOTE — ANESTHESIA PRE PROCEDURE
Department of Anesthesiology  Preprocedure Note       Name:  Peace Green   Age:  36 y.o.  :  1985                                          MRN:  [id number]         Date:  2019      Surgeon: Celso Cockayne):  Fide Saldana DPM    Procedure: OPEN PLANTAR FASCIECTOMY WITH PRP INJECTION LEFT (Left )    Medications prior to admission:   Prior to Admission medications    Medication Sig Start Date End Date Taking? Authorizing Provider   fluticasone (FLONASE) 50 MCG/ACT nasal spray instill 2 sprays into each nostril once daily 19  Yes Historical Provider, MD   ibuprofen (ADVIL;MOTRIN) 800 MG tablet Take 1 tablet by mouth every 8 hours as needed for Pain 9/12/18 4/15/19  Fide Saldana DPM       Current medications:    Current Facility-Administered Medications   Medication Dose Route Frequency Provider Last Rate Last Dose    ceFAZolin (ANCEF) 2 g in dextrose 5 % 50 mL IVPB  2 g Intravenous Once Fide Saldana DPM        0.9 % sodium chloride infusion   Intravenous Continuous Martha Henderson MD        lactated ringers infusion   Intravenous Continuous Martha Henderson  mL/hr at 19 4192      sodium chloride flush 0.9 % injection 10 mL  10 mL Intravenous 2 times per day Martha Henderson MD        sodium chloride flush 0.9 % injection 10 mL  10 mL Intravenous PRN Mirela Khoury MD        lidocaine PF 1 % injection 1 mL  1 mL Intradermal Once PRN Martha Henderson MD           Allergies:     Allergies   Allergen Reactions    No Known Allergies        Problem List:    Patient Active Problem List   Diagnosis Code    Vaginitis N76.0    Chlamydia A74.9    ASCUS with positive high risk HPV cervical R87.610, R87.810    Atypical squamous cell changes of undetermined significance (ASCUS) on cervical cytology with positive high risk human papilloma virus (HPV) R87.610, R87.810    HPV in female B97.7    Acute pancreatitis K85.90    Acute pharyngitis J02.9    Acute sinusitis J01.90  Alcohol abuse F10.10    Amenorrhea N91.2    Calculus of gallbladder with cholecystitis K80.10    Cholelithiasis and cholecystitis without obstruction K80.10    Depressive disorder F32.9    Disorder of lung J98.4    Generalized anxiety disorder F41.1    Headache R51    Irregular periods N92.6    Microscopic hematuria R31.29    Migraine without aura G43.009    Mixed anxiety and depressive disorder F41.8    Overweight E66.3    Tinea corporis B35.4    Plantar fascial fibromatosis M72.2    Pain in right foot M79.671    Epigastric pain R10.13    History of alcohol abuse Z87.898    History of cholecystectomy Z90.49    Intractable headache R51    Diarrhea R19.7    Elective  Z33.2    Pregnant Z34.90    Viral gastroenteritis A08.4       Past Medical History:        Diagnosis Date    Alcohol abuse 2017    Atypical squamous cell changes of undetermined significance (ASCUS) on cervical cytology with positive high risk human papilloma virus (HPV) 16    Chlamydia 10/9/12    Depressive disorder 2013    is doing well now, no longer on medication    Generalized anxiety disorder     History of anxiety     HPV in female 2016    Migraines     Mixed anxiety and depressive disorder     Seasonal allergies     Strep throat 2018    Tonsillitis 2018       Past Surgical History:        Procedure Laterality Date    CHOLECYSTECTOMY      PLANTAR FASCIA SURGERY Right 2018    PLANTAR FASCIOTOMY RIGHT AND PRP INJECTION RIGHT FOOT - CELLSAVER    [place] INCISION OF FOOT/TOE FASCIA Right 2018    PLANTAR FASCIOTOMY RIGHT AND PRP INJECTION RIGHT FOOT - CELLSAVER performed by Sara Parra DPM at [facility]       Social History:    Social History     Tobacco Use    Smoking status: Never Smoker    Smokeless tobacco: Never Used   Substance Use Topics    Alcohol use:  Yes     Alcohol/week: 0.0 standard drinks     Comment: Occassional Social full  Mouth opening: > = 3 FB Dental:          Pulmonary:                              Cardiovascular:                      Neuro/Psych:               GI/Hepatic/Renal:             Endo/Other:                     Abdominal:           Vascular:                                        Anesthesia Plan      MAC     ASA 2             Anesthetic plan and risks discussed with patient.                       Chantale Maddox MD   9/13/2019

## 2019-09-13 NOTE — H&P
Past Surgical History:   Procedure Laterality Date    CHOLECYSTECTOMY        PLANTAR FASCIA SURGERY Right 2018     PLANTAR FASCIOTOMY RIGHT AND PRP INJECTION RIGHT FOOT - CELLSAVER    [place] INCISION OF FOOT/TOE FASCIA Right 2018     PLANTAR FASCIOTOMY RIGHT AND PRP INJECTION RIGHT FOOT - CELLSAVER performed by Twila Braun DPM at [address] [place] [address]            Medications Prior to Admission:      Home Medications           Prior to Admission medications    Medication Sig Start Date End Date Taking? Authorizing Provider   fluticasone (FLONASE) 50 MCG/ACT nasal spray instill 2 sprays into each nostril once daily 19     Historical Provider, MD   ibuprofen (ADVIL;MOTRIN) 800 MG tablet Take 1 tablet by mouth every 8 hours as needed for Pain 9/12/18 4/15/19   Twila Braun DPM            Allergies:      No known allergies     Social History:      Tobacco:    reports that she has never smoked. She has never used smokeless tobacco.  Alcohol:      reports that she drinks alcohol. Drug Use:  reports that she does not use drugs. Functional Capacity:              1) Pt is able to walk 2 city blocks or more on level ground without SOB. 2) Pt is able to climb 2 flights of stairs without SOB. 3) Pt is able to walk up a hill for 1-2 city blocks without SOB.      Family History:      Family History         Family History   Problem Relation Age of Onset    Other Paternal Grandfather           sepsis    Diabetes Paternal Grandfather      Kidney Disease Paternal Grandfather           had transplant that caused sepsis    Cancer Paternal Grandmother           unsure of kind    Hypertension Paternal Grandmother      Hypertension Father      No Known Problems Mother      Breast Cancer Neg Hx      Colon Cancer Neg Hx      Eclampsia Neg Hx      Ovarian Cancer Neg Hx       Labor Neg Hx      Spont Abortions Neg Hx      Stroke Neg Hx      Heart Attack Neg Hx              Review of Systems:      Positive and Negative as described in HPI. CONSTITUTIONAL: Negative for fevers, chills, sweats, fatigue, and weight loss. HEENT: Negative for glasses, hearing changes, rhinorrhea, and throat pain. RESPIRATORY: Denies asthma, COPD, and sleep apnea. Negative for shortness of breath, cough, congestion, and wheezing. CARDIOVASCULAR: Negative for chest pain, blood clot, irregular heartbeat, and palpitations. GASTROINTESTINAL: Acid reflux. Negative for nausea, vomiting, diarrhea, constipation, change in bowel habits, and abdominal pain. GENITOURINARY: Negative for difficulty of urination, burning with urination, and frequency. INTEGUMENT: Negative for rash, skin lesions, and easy bruising. HEMATOLOGIC/LYMPHATIC:  Negative for swelling/edema. ALLERGIC/IMMUNOLOGIC:  Negative for urticaria and itching. ENDOCRINE: Negative for diabetes, increase in thirst, increase in urination, and heat or cold intolerance. MUSCULOSKELETAL: See HPI. NEUROLOGICAL: Headaches. Infrequent migraines. Negative for dizziness, lightheadedness, numbness, and tingling extremities. BEHAVIOR/PSYCH: Anxiety and depression. Pt states depression is under control. Physical Exam:   /76   Pulse 83   Resp 16   Ht 5' 5\" (1.651 m)   Wt 219 lb (99.3 kg)   SpO2 99%   BMI 36.44 kg/m²   No LMP recorded. (Menstrual status: Irregular periods). LMP was 07/2019. No results for input(s): POCGLU in the last 72 hours. General Appearance:  Alert, well appearing, and in no acute distress. Obese. Mental status:  Oriented to person, place, and time. Head:  Normocephalic and atraumatic. Eye:  No icterus, redness, pupils equal and reactive, extraocular eye movements intact, and conjunctiva clear. Ear:  Hearing grossly intact. Nose:  No drainage noted. Mouth:  Mucous membranes moist.  Neck:  Supple and no carotid bruits noted.   Lungs:  Bilateral equal air entry, clear to auscultation, no wheezing, rales or Cosigned by: Lata De La Rosa DPM at 9/3/2019 11:23 AM   Revision History

## 2019-09-23 ENCOUNTER — OFFICE VISIT (OUTPATIENT)
Dept: PODIATRY | Age: 34
End: 2019-09-23

## 2019-09-23 VITALS — BODY MASS INDEX: 34.99 KG/M2 | HEIGHT: 65 IN | WEIGHT: 210 LBS

## 2019-09-23 DIAGNOSIS — Z98.890 POST-OPERATIVE STATE: Primary | ICD-10-CM

## 2019-09-23 PROCEDURE — 99024 POSTOP FOLLOW-UP VISIT: CPT | Performed by: PODIATRIST

## 2019-09-23 RX ORDER — OXYCODONE HYDROCHLORIDE AND ACETAMINOPHEN 5; 325 MG/1; MG/1
1 TABLET ORAL EVERY 6 HOURS PRN
Qty: 28 TABLET | Refills: 0 | Status: SHIPPED | OUTPATIENT
Start: 2019-09-23 | End: 2019-09-30

## 2019-10-02 ENCOUNTER — OFFICE VISIT (OUTPATIENT)
Dept: PODIATRY | Age: 34
End: 2019-10-02
Payer: COMMERCIAL

## 2019-10-02 VITALS — WEIGHT: 210 LBS | HEIGHT: 65 IN | BODY MASS INDEX: 34.99 KG/M2

## 2019-10-02 DIAGNOSIS — M79.672 PAIN IN LEFT FOOT: ICD-10-CM

## 2019-10-02 DIAGNOSIS — M76.72 PERONEAL TENDONITIS OF LEFT LOWER EXTREMITY: ICD-10-CM

## 2019-10-02 DIAGNOSIS — Z98.890 POST-OPERATIVE STATE: Primary | ICD-10-CM

## 2019-10-02 PROCEDURE — 99024 POSTOP FOLLOW-UP VISIT: CPT | Performed by: PODIATRIST

## 2019-10-02 PROCEDURE — L4361 PNEUMA/VAC WALK BOOT PRE OTS: HCPCS | Performed by: PODIATRIST

## 2019-10-14 ENCOUNTER — HOSPITAL ENCOUNTER (OUTPATIENT)
Age: 34
Discharge: HOME | End: 2019-10-14
Payer: COMMERCIAL

## 2019-10-14 ENCOUNTER — OFFICE VISIT (OUTPATIENT)
Dept: OBGYN CLINIC | Age: 34
End: 2019-10-14
Payer: COMMERCIAL

## 2019-10-14 VITALS
DIASTOLIC BLOOD PRESSURE: 84 MMHG | BODY MASS INDEX: 36.49 KG/M2 | SYSTOLIC BLOOD PRESSURE: 128 MMHG | HEIGHT: 65 IN | WEIGHT: 219 LBS | HEART RATE: 74 BPM

## 2019-10-14 DIAGNOSIS — N92.6 IRREGULAR MENSES: ICD-10-CM

## 2019-10-14 DIAGNOSIS — N91.2 AMENORRHEA: Primary | ICD-10-CM

## 2019-10-14 LAB
ABSOLUTE EOS #: 0.2 K/UL (ref 0–0.4)
ABSOLUTE IMMATURE GRANULOCYTE: NORMAL K/UL (ref 0–0.3)
ABSOLUTE LYMPH #: 2.2 K/UL (ref 1–4.8)
ABSOLUTE MONO #: 0.5 K/UL (ref 0.1–1.3)
ALT SERPL-CCNC: 29 U/L (ref 5–33)
AST SERPL-CCNC: 20 U/L
BASOPHILS # BLD: 1 % (ref 0–2)
BASOPHILS ABSOLUTE: 0 K/UL (ref 0–0.2)
BUN BLDV-MCNC: 10 MG/DL (ref 6–20)
CREAT SERPL-MCNC: 0.51 MG/DL (ref 0.5–0.9)
DIFFERENTIAL TYPE: NORMAL
EOSINOPHILS RELATIVE PERCENT: 3 % (ref 0–4)
ESTIMATED AVERAGE GLUCOSE: 108 MG/DL
FOLLICLE STIMULATING HORMONE: 4.1 U/L (ref 1.7–21.5)
GFR AFRICAN AMERICAN: >60 ML/MIN
GFR NON-AFRICAN AMERICAN: >60 ML/MIN
GFR SERPL CREATININE-BSD FRML MDRD: NORMAL ML/MIN/{1.73_M2}
GFR SERPL CREATININE-BSD FRML MDRD: NORMAL ML/MIN/{1.73_M2}
GLUCOSE FASTING: 100 MG/DL (ref 70–99)
HBA1C MFR BLD: 5.4 % (ref 4–6)
HCG QUANTITATIVE: <1 IU/L
HCT VFR BLD CALC: 39.6 % (ref 36–46)
HEMOGLOBIN: 13 G/DL (ref 12–16)
IMMATURE GRANULOCYTES: NORMAL %
INSULIN COMMENT: NORMAL
INSULIN REFERENCE RANGE:: NORMAL
INSULIN: 9.7 MU/L
LH: 5.4 U/L (ref 1–95.6)
LYMPHOCYTES # BLD: 34 % (ref 24–44)
MCH RBC QN AUTO: 26.3 PG (ref 26–34)
MCHC RBC AUTO-ENTMCNC: 32.8 G/DL (ref 31–37)
MCV RBC AUTO: 80.1 FL (ref 80–100)
MONOCYTES # BLD: 7 % (ref 1–7)
NRBC AUTOMATED: NORMAL PER 100 WBC
PDW BLD-RTO: 13.7 % (ref 11.5–14.9)
PLATELET # BLD: 335 K/UL (ref 150–450)
PLATELET ESTIMATE: NORMAL
PMV BLD AUTO: 8.8 FL (ref 6–12)
RBC # BLD: 4.94 M/UL (ref 4–5.2)
RBC # BLD: NORMAL 10*6/UL
SEG NEUTROPHILS: 55 % (ref 36–66)
SEGMENTED NEUTROPHILS ABSOLUTE COUNT: 3.6 K/UL (ref 1.3–9.1)
TSH SERPL DL<=0.05 MIU/L-ACNC: 1.63 MIU/L (ref 0.3–5)
WBC # BLD: 6.5 K/UL (ref 3.5–11)
WBC # BLD: NORMAL 10*3/UL

## 2019-10-14 PROCEDURE — 85025 COMPLETE CBC W/AUTO DIFF WBC: CPT

## 2019-10-14 PROCEDURE — 84520 ASSAY OF UREA NITROGEN: CPT

## 2019-10-14 PROCEDURE — 84460 ALANINE AMINO (ALT) (SGPT): CPT

## 2019-10-14 PROCEDURE — 36415 COLL VENOUS BLD VENIPUNCTURE: CPT

## 2019-10-14 PROCEDURE — 84702 CHORIONIC GONADOTROPIN TEST: CPT

## 2019-10-14 PROCEDURE — 82947 ASSAY GLUCOSE BLOOD QUANT: CPT

## 2019-10-14 PROCEDURE — 99212 OFFICE O/P EST SF 10 MIN: CPT | Performed by: NURSE PRACTITIONER

## 2019-10-14 PROCEDURE — 83525 ASSAY OF INSULIN: CPT

## 2019-10-14 PROCEDURE — 83001 ASSAY OF GONADOTROPIN (FSH): CPT

## 2019-10-14 PROCEDURE — 84450 TRANSFERASE (AST) (SGOT): CPT

## 2019-10-14 PROCEDURE — 83036 HEMOGLOBIN GLYCOSYLATED A1C: CPT

## 2019-10-14 PROCEDURE — 82565 ASSAY OF CREATININE: CPT

## 2019-10-14 PROCEDURE — 84443 ASSAY THYROID STIM HORMONE: CPT

## 2019-10-14 PROCEDURE — 83002 ASSAY OF GONADOTROPIN (LH): CPT

## 2019-10-16 ENCOUNTER — OFFICE VISIT (OUTPATIENT)
Dept: PODIATRY | Age: 34
End: 2019-10-16

## 2019-10-16 VITALS — WEIGHT: 210 LBS | BODY MASS INDEX: 34.99 KG/M2 | HEIGHT: 65 IN

## 2019-10-16 DIAGNOSIS — Z98.890 POST-OPERATIVE STATE: Primary | ICD-10-CM

## 2019-10-16 PROCEDURE — 99024 POSTOP FOLLOW-UP VISIT: CPT | Performed by: PODIATRIST

## 2019-10-21 ENCOUNTER — OFFICE VISIT (OUTPATIENT)
Dept: OBGYN CLINIC | Age: 34
End: 2019-10-21
Payer: COMMERCIAL

## 2019-10-21 DIAGNOSIS — N92.6 IRREGULAR MENSES: ICD-10-CM

## 2019-10-21 PROCEDURE — 76830 TRANSVAGINAL US NON-OB: CPT | Performed by: OBSTETRICS & GYNECOLOGY

## 2019-10-21 PROCEDURE — 76856 US EXAM PELVIC COMPLETE: CPT | Performed by: OBSTETRICS & GYNECOLOGY

## 2019-11-26 ENCOUNTER — TELEPHONE (OUTPATIENT)
Dept: OBGYN CLINIC | Age: 34
End: 2019-11-26

## 2019-12-03 ENCOUNTER — TELEPHONE (OUTPATIENT)
Dept: OBGYN CLINIC | Age: 34
End: 2019-12-03

## 2019-12-31 ENCOUNTER — INITIAL PRENATAL (OUTPATIENT)
Dept: OBGYN CLINIC | Age: 34
End: 2019-12-31

## 2019-12-31 ENCOUNTER — HOSPITAL ENCOUNTER (OUTPATIENT)
Age: 34
Setting detail: SPECIMEN
Discharge: HOME | End: 2019-12-31
Payer: COMMERCIAL

## 2019-12-31 VITALS
DIASTOLIC BLOOD PRESSURE: 70 MMHG | SYSTOLIC BLOOD PRESSURE: 110 MMHG | HEART RATE: 84 BPM | WEIGHT: 217 LBS | BODY MASS INDEX: 36.11 KG/M2

## 2019-12-31 DIAGNOSIS — O09.299 HX OF FORCEPS DELIVERY IN PRIOR PREGNANCY, CURRENTLY PREGNANT: ICD-10-CM

## 2019-12-31 DIAGNOSIS — Z34.90 EARLY STAGE OF PREGNANCY: Primary | ICD-10-CM

## 2019-12-31 DIAGNOSIS — Z36.9 FIRST TRIMESTER SCREENING: Primary | ICD-10-CM

## 2019-12-31 PROBLEM — R19.7 DIARRHEA: Status: RESOLVED | Noted: 2019-04-15 | Resolved: 2019-12-31

## 2019-12-31 PROBLEM — N91.2 AMENORRHEA: Status: RESOLVED | Noted: 2017-03-22 | Resolved: 2019-12-31

## 2019-12-31 PROBLEM — A08.4 VIRAL GASTROENTERITIS: Status: RESOLVED | Noted: 2019-04-15 | Resolved: 2019-12-31

## 2019-12-31 LAB
DIRECT EXAM: ABNORMAL
Lab: ABNORMAL
SPECIMEN DESCRIPTION: ABNORMAL

## 2019-12-31 PROCEDURE — 87660 TRICHOMONAS VAGIN DIR PROBE: CPT

## 2019-12-31 PROCEDURE — 87510 GARDNER VAG DNA DIR PROBE: CPT

## 2019-12-31 PROCEDURE — 87491 CHLMYD TRACH DNA AMP PROBE: CPT

## 2019-12-31 PROCEDURE — 87591 N.GONORRHOEAE DNA AMP PROB: CPT

## 2019-12-31 PROCEDURE — 87070 CULTURE OTHR SPECIMN AEROBIC: CPT

## 2019-12-31 PROCEDURE — 87480 CANDIDA DNA DIR PROBE: CPT

## 2019-12-31 PROCEDURE — 0500F INITIAL PRENATAL CARE VISIT: CPT | Performed by: NURSE PRACTITIONER

## 2019-12-31 RX ORDER — PNV NO.95/FERROUS FUM/FOLIC AC 28MG-0.8MG
1 TABLET ORAL DAILY
Qty: 30 TABLET | Refills: 11 | Status: SHIPPED | OUTPATIENT
Start: 2019-12-31 | End: 2020-09-30

## 2020-01-02 ENCOUNTER — TELEPHONE (OUTPATIENT)
Dept: OBGYN CLINIC | Age: 35
End: 2020-01-02

## 2020-01-02 NOTE — TELEPHONE ENCOUNTER
----- Message from Dilma Harris, Macie Handleyumas Anish sent at 1/2/2020  8:09 AM EST -----  + BV  Flagyl 500 mg # 14 BID po x 7 days  + pregnancy treat after 15 weeks gestation

## 2020-01-03 LAB
CULTURE: NORMAL
CULTURE: NORMAL
Lab: NORMAL
SPECIMEN DESCRIPTION: NORMAL

## 2020-01-06 ENCOUNTER — HOSPITAL ENCOUNTER (OUTPATIENT)
Age: 35
Discharge: HOME | End: 2020-01-06
Payer: COMMERCIAL

## 2020-01-06 LAB
-: ABNORMAL
ABO/RH: NORMAL
ABSOLUTE EOS #: 0.07 K/UL (ref 0–0.44)
ABSOLUTE IMMATURE GRANULOCYTE: 0.04 K/UL (ref 0–0.3)
ABSOLUTE LYMPH #: 1.97 K/UL (ref 1.1–3.7)
ABSOLUTE MONO #: 0.56 K/UL (ref 0.1–1.2)
AMORPHOUS: ABNORMAL
ANTIBODY SCREEN: NEGATIVE
BACTERIA: ABNORMAL
BASOPHILS # BLD: 0 % (ref 0–2)
BASOPHILS ABSOLUTE: 0.03 K/UL (ref 0–0.2)
BILIRUBIN URINE: NEGATIVE
CASTS UA: ABNORMAL /LPF (ref 0–8)
COLOR: ABNORMAL
COMMENT UA: ABNORMAL
CRYSTALS, UA: ABNORMAL /HPF
DIFFERENTIAL TYPE: ABNORMAL
EOSINOPHILS RELATIVE PERCENT: 1 % (ref 1–4)
EPITHELIAL CELLS UA: ABNORMAL /HPF (ref 0–5)
GLUCOSE BLD-MCNC: 113 MG/DL (ref 70–99)
GLUCOSE URINE: NEGATIVE
HCG QUANTITATIVE: ABNORMAL IU/L
HCT VFR BLD CALC: 37.4 % (ref 36.3–47.1)
HEMOGLOBIN: 12.2 G/DL (ref 11.9–15.1)
HEPATITIS B SURFACE ANTIGEN: NONREACTIVE
HIV AG/AB: NONREACTIVE
IMMATURE GRANULOCYTES: 0 %
KETONES, URINE: NEGATIVE
LEUKOCYTE ESTERASE, URINE: ABNORMAL
LYMPHOCYTES # BLD: 22 % (ref 24–43)
MCH RBC QN AUTO: 25.5 PG (ref 25.2–33.5)
MCHC RBC AUTO-ENTMCNC: 32.6 G/DL (ref 28.4–34.8)
MCV RBC AUTO: 78.2 FL (ref 82.6–102.9)
MONOCYTES # BLD: 6 % (ref 3–12)
MUCUS: ABNORMAL
NITRITE, URINE: NEGATIVE
NRBC AUTOMATED: 0 PER 100 WBC
OTHER OBSERVATIONS UA: ABNORMAL
PDW BLD-RTO: 12.9 % (ref 11.8–14.4)
PH UA: 6 (ref 5–8)
PLATELET # BLD: 359 K/UL (ref 138–453)
PLATELET ESTIMATE: ABNORMAL
PMV BLD AUTO: 11 FL (ref 8.1–13.5)
PROTEIN UA: ABNORMAL
RBC # BLD: 4.78 M/UL (ref 3.95–5.11)
RBC # BLD: ABNORMAL 10*6/UL
RBC UA: ABNORMAL /HPF (ref 0–4)
RENAL EPITHELIAL, UA: ABNORMAL /HPF
RUBV IGG SER QL: 43.2 IU/ML
SEG NEUTROPHILS: 71 % (ref 36–65)
SEGMENTED NEUTROPHILS ABSOLUTE COUNT: 6.49 K/UL (ref 1.5–8.1)
SPECIFIC GRAVITY UA: 1.03 (ref 1–1.03)
TRICHOMONAS: ABNORMAL
TSH SERPL DL<=0.05 MIU/L-ACNC: 0.66 MIU/L (ref 0.3–5)
TURBIDITY: ABNORMAL
URINE HGB: NEGATIVE
UROBILINOGEN, URINE: NORMAL
WBC # BLD: 9.2 K/UL (ref 3.5–11.3)
WBC # BLD: ABNORMAL 10*3/UL
WBC UA: ABNORMAL /HPF (ref 0–5)
YEAST: ABNORMAL

## 2020-01-06 PROCEDURE — 84443 ASSAY THYROID STIM HORMONE: CPT

## 2020-01-06 PROCEDURE — 86901 BLOOD TYPING SEROLOGIC RH(D): CPT

## 2020-01-06 PROCEDURE — 82947 ASSAY GLUCOSE BLOOD QUANT: CPT

## 2020-01-06 PROCEDURE — 86403 PARTICLE AGGLUT ANTBDY SCRN: CPT

## 2020-01-06 PROCEDURE — 36415 COLL VENOUS BLD VENIPUNCTURE: CPT

## 2020-01-06 PROCEDURE — 86900 BLOOD TYPING SEROLOGIC ABO: CPT

## 2020-01-06 PROCEDURE — 87340 HEPATITIS B SURFACE AG IA: CPT

## 2020-01-06 PROCEDURE — 86762 RUBELLA ANTIBODY: CPT

## 2020-01-06 PROCEDURE — 84702 CHORIONIC GONADOTROPIN TEST: CPT

## 2020-01-06 PROCEDURE — 86850 RBC ANTIBODY SCREEN: CPT

## 2020-01-06 PROCEDURE — 81001 URINALYSIS AUTO W/SCOPE: CPT

## 2020-01-06 PROCEDURE — 87389 HIV-1 AG W/HIV-1&-2 AB AG IA: CPT

## 2020-01-06 PROCEDURE — 85025 COMPLETE CBC W/AUTO DIFF WBC: CPT

## 2020-01-06 PROCEDURE — 87086 URINE CULTURE/COLONY COUNT: CPT

## 2020-01-07 PROBLEM — R82.71 GBS BACTERIURIA: Status: ACTIVE | Noted: 2020-01-07

## 2020-01-07 LAB
CULTURE: ABNORMAL
CULTURE: ABNORMAL
Lab: ABNORMAL
SPECIMEN DESCRIPTION: ABNORMAL

## 2020-01-08 ENCOUNTER — TELEPHONE (OUTPATIENT)
Dept: OBGYN CLINIC | Age: 35
End: 2020-01-08

## 2020-01-08 RX ORDER — AMPICILLIN 500 MG/1
500 CAPSULE ORAL 4 TIMES DAILY
Qty: 40 CAPSULE | Refills: 0 | Status: SHIPPED | OUTPATIENT
Start: 2020-01-08 | End: 2020-01-18

## 2020-01-08 NOTE — TELEPHONE ENCOUNTER
Patient made aware of +gbs in urine and indication for ampicillin treatment, per Rajat Dhillon CNP. Patient voiced an understanding of results and plan of care. Medication ordered accordingly.

## 2020-01-09 ENCOUNTER — ROUTINE PRENATAL (OUTPATIENT)
Dept: PERINATAL CARE | Age: 35
End: 2020-01-09
Payer: COMMERCIAL

## 2020-01-09 VITALS
HEIGHT: 65 IN | SYSTOLIC BLOOD PRESSURE: 118 MMHG | DIASTOLIC BLOOD PRESSURE: 76 MMHG | HEART RATE: 82 BPM | TEMPERATURE: 97.6 F | WEIGHT: 218 LBS | RESPIRATION RATE: 16 BRPM | BODY MASS INDEX: 36.32 KG/M2

## 2020-01-09 LAB
CRL: NORMAL
SAC DIAMETER: NORMAL

## 2020-01-09 PROCEDURE — 76813 OB US NUCHAL MEAS 1 GEST: CPT | Performed by: OBSTETRICS & GYNECOLOGY

## 2020-01-09 PROCEDURE — 76801 OB US < 14 WKS SINGLE FETUS: CPT | Performed by: OBSTETRICS & GYNECOLOGY

## 2020-01-10 ENCOUNTER — TELEPHONE (OUTPATIENT)
Dept: OBGYN CLINIC | Age: 35
End: 2020-01-10

## 2020-01-13 ENCOUNTER — TELEPHONE (OUTPATIENT)
Dept: OBGYN CLINIC | Age: 35
End: 2020-01-13

## 2020-01-27 ENCOUNTER — ROUTINE PRENATAL (OUTPATIENT)
Dept: OBGYN CLINIC | Age: 35
End: 2020-01-27

## 2020-01-27 VITALS
BODY MASS INDEX: 36.78 KG/M2 | HEART RATE: 82 BPM | DIASTOLIC BLOOD PRESSURE: 68 MMHG | WEIGHT: 221 LBS | SYSTOLIC BLOOD PRESSURE: 122 MMHG

## 2020-01-27 PROCEDURE — 0502F SUBSEQUENT PRENATAL CARE: CPT | Performed by: NURSE PRACTITIONER

## 2020-01-27 RX ORDER — METRONIDAZOLE 500 MG/1
500 TABLET ORAL 2 TIMES DAILY
Qty: 14 TABLET | Refills: 0 | Status: SHIPPED | OUTPATIENT
Start: 2020-01-27 | End: 2020-02-03

## 2020-01-27 NOTE — PROGRESS NOTES
Zehra Cook is a 36 y.o. female 16w0d    [id number]    OB History    Para Term  AB Living   5 2 2 0 2 2   SAB TAB Ectopic Molar Multiple Live Births   0 2 0   0        # Outcome Date GA Lbr Henrik/2nd Weight Sex Delivery Anes PTL Lv   5 Current            4 Term 02/15/09 39w0d 07:00 7 lb 12 oz (3.515 kg) F Vag-Spont None     3 Term 06 40w0d 07:00 8 lb 5 oz (3.771 kg) F Vag-Forceps None     2 TAB            1 TAB                      Vitals  BP: 122/68  Weight: 221 lb (100.2 kg)  Pulse: 82  Patient Position: Sitting  Albumin: Trace  Glucose: Negative      The patient was seen and evaluated. There was Positive fetal movements. No contractions or leakage of fluid. Signs and symptoms of  labor as well as labor were reviewed. The Nuchal Translucency testing was reviewed and found to be normal. A single marker MSAFP was ordered for a 15-20 week gestational age window. TOP ST OH Reviewed. Dates were reviewed with the patient. An 18-22 week anatomy ultrasound has been ordered. The patient will return to the office for her next visit in 4 weeks. See antepartum flow sheet. 19 - +BV, will need flagyl after 15 weeks per Evans Moore  2020  testing at 32 weeks          Assessment:  1. Zehra Cook is a 36 y.o. female  2.  [id number]  3. 16w0d    Patient Active Problem List    Diagnosis Date Noted    GBS bacteriuria 2020     Priority: High     2020 treated with Ampicillin      Hx of forceps delivery in prior pregnancy, currently pregnant 2019     Priority: High    Elective  X 2 04/15/2019     Priority: High    Body mass index (bmi) 36.0-36.9, adult in pregnancy 2020 Early 1 hour GTT   testing at 32 weeks       Pain in left foot     Intractable headache 2019    Epigastric pain 2019    History of alcohol abuse 2019    History of cholecystectomy 2019    Plantar fascial fibromatosis     Pain in right foot     Acute sinusitis 2018    Headache 2018    Irregular periods 2018    Mixed anxiety and depressive disorder 2018    Acute pancreatitis 2017    Cholelithiasis and cholecystitis without obstruction 2017    Alcohol abuse 2017    Tinea corporis 2017    ASCUS with positive high risk HPV cervical 2016    Atypical squamous cell changes of undetermined significance (ASCUS) on cervical cytology with positive high risk human papilloma virus (HPV) 2016    HPV in female 2016    Microscopic hematuria 2014    Disorder of lung 2014    Calculus of gallbladder with cholecystitis 2014    Acute pharyngitis 2014    Migraine without aura 2013    Depressive disorder 2013    Generalized anxiety disorder 2013    Overweight 2013        Diagnosis Orders   1. 16 weeks gestation of pregnancy  metroNIDAZOLE (FLAGYL) 500 MG tablet    Urinalysis Reflex to Culture    Alpha Fetoprotein, Maternal   2. Hx of forceps delivery in prior pregnancy, currently pregnant     3. Body mass index (bmi) 36.0-36.9, adult     4. GBS bacteriuria     5. Elective      6. BV (bacterial vaginosis)  metroNIDAZOLE (FLAGYL) 500 MG tablet         Plan:  RTO in 4 weeks. Prescription for flagyl for + BV sent to pharmacy. Lab slips given for MSAFP, 1 hour GTT, UA C&S, RPR, Cystic fibrosis.   Patient given permission note for dental work- per request.

## 2020-02-05 ENCOUNTER — HOSPITAL ENCOUNTER (OUTPATIENT)
Age: 35
Discharge: HOME | End: 2020-02-05
Payer: COMMERCIAL

## 2020-02-05 ENCOUNTER — TELEPHONE (OUTPATIENT)
Dept: OBGYN CLINIC | Age: 35
End: 2020-02-05

## 2020-02-05 LAB
-: ABNORMAL
AMORPHOUS: ABNORMAL
BACTERIA: ABNORMAL
BILIRUBIN URINE: NEGATIVE
CASTS UA: ABNORMAL /LPF (ref 0–8)
COLOR: YELLOW
COMMENT UA: ABNORMAL
CRYSTALS, UA: ABNORMAL /HPF
EPITHELIAL CELLS UA: ABNORMAL /HPF (ref 0–5)
GLUCOSE ADMINISTRATION: ABNORMAL
GLUCOSE TOLERANCE SCREEN 50G: 148 MG/DL (ref 70–135)
GLUCOSE URINE: NEGATIVE
KETONES, URINE: ABNORMAL
LEUKOCYTE ESTERASE, URINE: ABNORMAL
MUCUS: ABNORMAL
NITRITE, URINE: NEGATIVE
OTHER OBSERVATIONS UA: ABNORMAL
PH UA: 7.5 (ref 5–8)
PROTEIN UA: NEGATIVE
RBC UA: ABNORMAL /HPF (ref 0–4)
RENAL EPITHELIAL, UA: ABNORMAL /HPF
SPECIFIC GRAVITY UA: 1.02 (ref 1–1.03)
T. PALLIDUM, IGG: NONREACTIVE
TRICHOMONAS: ABNORMAL
TURBIDITY: ABNORMAL
URINE HGB: NEGATIVE
UROBILINOGEN, URINE: NORMAL
WBC UA: ABNORMAL /HPF (ref 0–5)
YEAST: ABNORMAL

## 2020-02-05 PROCEDURE — 86403 PARTICLE AGGLUT ANTBDY SCRN: CPT

## 2020-02-05 PROCEDURE — 36415 COLL VENOUS BLD VENIPUNCTURE: CPT

## 2020-02-05 PROCEDURE — 87086 URINE CULTURE/COLONY COUNT: CPT

## 2020-02-05 PROCEDURE — 82950 GLUCOSE TEST: CPT

## 2020-02-05 PROCEDURE — 81220 CFTR GENE COM VARIANTS: CPT

## 2020-02-05 PROCEDURE — 82105 ALPHA-FETOPROTEIN SERUM: CPT

## 2020-02-05 PROCEDURE — 81001 URINALYSIS AUTO W/SCOPE: CPT

## 2020-02-05 PROCEDURE — 86780 TREPONEMA PALLIDUM: CPT

## 2020-02-05 NOTE — TELEPHONE ENCOUNTER
----- Message from NAHUN Bullard NP sent at 2/5/2020 12:17 PM EST -----  Elevated 1 hour Gtt  Please order 3 hour and hgb a1c

## 2020-02-07 ENCOUNTER — TELEPHONE (OUTPATIENT)
Dept: OBGYN CLINIC | Age: 35
End: 2020-02-07

## 2020-02-07 LAB
AFP INTERPRETATION: NORMAL
AFP MOM: 1.92
AFP SPECIMEN: NORMAL
AFP: 57 NG/ML
CULTURE: ABNORMAL
CULTURE: ABNORMAL
DATE OF BIRTH: NORMAL
DATING METHOD: NORMAL
DETERMINED BY: NORMAL
DIABETIC: NEGATIVE
DUE DATE: NORMAL
ESTIMATED DUE DATE: NORMAL
FAMILY HISTORY NTD: NEGATIVE
GESTATIONAL AGE: NORMAL
INSULIN REQ DIABETES: NO
LAST MENSTRUAL PERIOD: NORMAL
Lab: ABNORMAL
MATERNAL AGE AT EDD: 33.9 YR
MATERNAL WEIGHT: 221
MONOCHORIONIC TWINS: NORMAL
NUMBER OF FETUSES: NORMAL
PATIENT WEIGHT UNITS: NORMAL
PATIENT WEIGHT: NORMAL
RACE (MATERNAL): NORMAL
RACE: NORMAL
REPEAT SPECIMEN?: NORMAL
SMOKING: NORMAL
SMOKING: NORMAL
SPECIMEN DESCRIPTION: ABNORMAL
VALPROIC/CARBAMAZEP: NORMAL
ZZ NTE CLEAN UP: HISTORY: NO

## 2020-02-07 RX ORDER — AMPICILLIN 500 MG/1
500 CAPSULE ORAL 4 TIMES DAILY
Qty: 40 CAPSULE | Refills: 0 | Status: SHIPPED | OUTPATIENT
Start: 2020-02-07 | End: 2020-02-17

## 2020-02-07 NOTE — TELEPHONE ENCOUNTER
----- Message from NAHUN Lamar CNP sent at 2/7/2020 12:00 PM EST -----  +GBS in urine  Ampicillin 500mg PO QID x 10 days

## 2020-02-12 LAB — CYSTIC FIBROSIS: NORMAL

## 2020-02-24 ENCOUNTER — ROUTINE PRENATAL (OUTPATIENT)
Dept: OBGYN CLINIC | Age: 35
End: 2020-02-24

## 2020-02-24 ENCOUNTER — HOSPITAL ENCOUNTER (OUTPATIENT)
Age: 35
Setting detail: SPECIMEN
Discharge: HOME | End: 2020-02-24
Payer: COMMERCIAL

## 2020-02-24 VITALS
BODY MASS INDEX: 36.44 KG/M2 | DIASTOLIC BLOOD PRESSURE: 78 MMHG | SYSTOLIC BLOOD PRESSURE: 116 MMHG | HEART RATE: 84 BPM | WEIGHT: 219 LBS

## 2020-02-24 LAB
DIRECT EXAM: NORMAL
Lab: NORMAL
SPECIMEN DESCRIPTION: NORMAL

## 2020-02-24 PROCEDURE — 87660 TRICHOMONAS VAGIN DIR PROBE: CPT

## 2020-02-24 PROCEDURE — 87510 GARDNER VAG DNA DIR PROBE: CPT

## 2020-02-24 PROCEDURE — 87480 CANDIDA DNA DIR PROBE: CPT

## 2020-02-24 PROCEDURE — 0502F SUBSEQUENT PRENATAL CARE: CPT | Performed by: OBSTETRICS & GYNECOLOGY

## 2020-02-24 RX ORDER — METRONIDAZOLE 500 MG/1
500 TABLET ORAL 2 TIMES DAILY
Qty: 20 TABLET | Refills: 0 | Status: SHIPPED | OUTPATIENT
Start: 2020-02-24 | End: 2020-03-05

## 2020-02-25 NOTE — PROGRESS NOTES
Eric Fan is a 36 y.o. female 20w1d    Has anatomy US on 2020    [id number]    OB History    Para Term  AB Living   5 2 2 0 2 2   SAB TAB Ectopic Molar Multiple Live Births   0 2 0   0        # Outcome Date GA Lbr Henrik/2nd Weight Sex Delivery Anes PTL Lv   5 Current            4 Term 02/15/09 39w0d 07:00 7 lb 12 oz (3.515 kg) F Vag-Spont None     3 Term 06 40w0d 07:00 8 lb 5 oz (3.771 kg) F Vag-Forceps None     2 TAB            1 TAB                Vitals  BP: 116/78  Weight: 219 lb (99.3 kg)  Pulse: 84  Patient Position: Sitting  Albumin: Trace  Glucose: Negative  Fetal Heart Rate: 150  Movement: Present    + FM  No VB  No LOF  No CTX      19 - +BV, will need flagyl after 15 weeks per Aide Marquez  2020  testing at 32 weeks        Assessment:  Niesha Fan is a 36 y.o. female  2.  [id number]  3. 20w1d    Patient Active Problem List    Diagnosis Date Noted    Hx of forceps delivery in prior pregnancy, currently pregnant 2019     Priority: High    Body mass index (bmi) 36.0-36.9, adult in pregnancy 2020 Early 1 hour GTT   testing at 32 weeks       GBS bacteriuria 2020 treated with Ampicillin      Pain in left foot     Elective  X 2 04/15/2019    Intractable headache 2019    Epigastric pain 2019    History of alcohol abuse 2019    History of cholecystectomy 2019    Plantar fascial fibromatosis     Pain in right foot     Acute sinusitis 2018    Headache 2018    Irregular periods 2018    Mixed anxiety and depressive disorder 2018    Acute pancreatitis 2017    Cholelithiasis and cholecystitis without obstruction 2017    Alcohol abuse 2017    Tinea corporis 2017    ASCUS with positive high risk HPV cervical 2016    Atypical squamous cell changes of undetermined significance (ASCUS) on cervical cytology

## 2020-02-27 ENCOUNTER — ROUTINE PRENATAL (OUTPATIENT)
Dept: PERINATAL CARE | Age: 35
End: 2020-02-27
Payer: COMMERCIAL

## 2020-02-27 VITALS
RESPIRATION RATE: 16 BRPM | TEMPERATURE: 97.9 F | SYSTOLIC BLOOD PRESSURE: 120 MMHG | HEIGHT: 65 IN | BODY MASS INDEX: 36.82 KG/M2 | WEIGHT: 221 LBS | HEART RATE: 84 BPM | DIASTOLIC BLOOD PRESSURE: 68 MMHG

## 2020-02-27 PROCEDURE — 76817 TRANSVAGINAL US OBSTETRIC: CPT | Performed by: OBSTETRICS & GYNECOLOGY

## 2020-02-27 PROCEDURE — 76811 OB US DETAILED SNGL FETUS: CPT | Performed by: OBSTETRICS & GYNECOLOGY

## 2020-02-28 ENCOUNTER — HOSPITAL ENCOUNTER (OUTPATIENT)
Age: 35
Discharge: HOME | End: 2020-02-28
Attending: OBSTETRICS & GYNECOLOGY | Admitting: OBSTETRICS & GYNECOLOGY
Payer: COMMERCIAL

## 2020-02-28 VITALS
RESPIRATION RATE: 20 BRPM | TEMPERATURE: 98.8 F | DIASTOLIC BLOOD PRESSURE: 69 MMHG | SYSTOLIC BLOOD PRESSURE: 115 MMHG | HEART RATE: 83 BPM

## 2020-02-28 PROBLEM — O09.90 HRP (HIGH RISK PREGNANCY), UNSPECIFIED TRIMESTER: Status: ACTIVE | Noted: 2020-02-28

## 2020-02-28 PROBLEM — O44.00 PLACENTA PREVIA: Status: ACTIVE | Noted: 2020-02-28

## 2020-02-28 LAB
-: ABNORMAL
ABSOLUTE EOS #: 0.07 K/UL (ref 0–0.44)
ABSOLUTE IMMATURE GRANULOCYTE: 0.07 K/UL (ref 0–0.3)
ABSOLUTE LYMPH #: 1.09 K/UL (ref 1.1–3.7)
ABSOLUTE MONO #: 0.56 K/UL (ref 0.1–1.2)
ALBUMIN SERPL-MCNC: 3.6 G/DL (ref 3.5–5.2)
ALBUMIN/GLOBULIN RATIO: 1.1 (ref 1–2.5)
ALP BLD-CCNC: 62 U/L (ref 35–104)
ALT SERPL-CCNC: 15 U/L (ref 5–33)
AMORPHOUS: ABNORMAL
AMYLASE: 63 U/L (ref 28–100)
ANION GAP SERPL CALCULATED.3IONS-SCNC: 13 MMOL/L (ref 9–17)
AST SERPL-CCNC: 17 U/L
BACTERIA: ABNORMAL
BASOPHILS # BLD: 0 % (ref 0–2)
BASOPHILS ABSOLUTE: <0.03 K/UL (ref 0–0.2)
BILIRUB SERPL-MCNC: 0.29 MG/DL (ref 0.3–1.2)
BILIRUBIN URINE: NEGATIVE
BUN BLDV-MCNC: 7 MG/DL (ref 6–20)
BUN/CREAT BLD: ABNORMAL (ref 9–20)
C DIFF AG + TOXIN: NEGATIVE
CALCIUM SERPL-MCNC: 8.7 MG/DL (ref 8.6–10.4)
CASTS UA: ABNORMAL /LPF (ref 0–2)
CHLORIDE BLD-SCNC: 103 MMOL/L (ref 98–107)
CO2: 20 MMOL/L (ref 20–31)
COLOR: ABNORMAL
COMMENT UA: ABNORMAL
CREAT SERPL-MCNC: 0.36 MG/DL (ref 0.5–0.9)
CRYSTALS, UA: ABNORMAL /HPF
DIFFERENTIAL TYPE: ABNORMAL
DIRECT EXAM: NORMAL
EOSINOPHILS RELATIVE PERCENT: 1 % (ref 1–4)
EPITHELIAL CELLS UA: ABNORMAL /HPF (ref 0–5)
GFR AFRICAN AMERICAN: >60 ML/MIN
GFR NON-AFRICAN AMERICAN: >60 ML/MIN
GFR SERPL CREATININE-BSD FRML MDRD: ABNORMAL ML/MIN/{1.73_M2}
GFR SERPL CREATININE-BSD FRML MDRD: ABNORMAL ML/MIN/{1.73_M2}
GLUCOSE BLD-MCNC: 83 MG/DL (ref 70–99)
GLUCOSE URINE: NEGATIVE
HCT VFR BLD CALC: 34.7 % (ref 36.3–47.1)
HEMOGLOBIN: 11.1 G/DL (ref 11.9–15.1)
IMMATURE GRANULOCYTES: 1 %
KETONES, URINE: ABNORMAL
LEUKOCYTE ESTERASE, URINE: ABNORMAL
LIPASE: 37 U/L (ref 13–60)
LYMPHOCYTES # BLD: 14 % (ref 24–43)
Lab: NORMAL
MCH RBC QN AUTO: 25.1 PG (ref 25.2–33.5)
MCHC RBC AUTO-ENTMCNC: 32 G/DL (ref 28.4–34.8)
MCV RBC AUTO: 78.5 FL (ref 82.6–102.9)
MONOCYTES # BLD: 7 % (ref 3–12)
MUCUS: ABNORMAL
NITRITE, URINE: NEGATIVE
NRBC AUTOMATED: 0 PER 100 WBC
OTHER OBSERVATIONS UA: ABNORMAL
PDW BLD-RTO: 13.8 % (ref 11.8–14.4)
PH UA: 5.5 (ref 5–8)
PLATELET # BLD: 322 K/UL (ref 138–453)
PLATELET ESTIMATE: ABNORMAL
PMV BLD AUTO: 11.1 FL (ref 8.1–13.5)
POTASSIUM SERPL-SCNC: 3.7 MMOL/L (ref 3.7–5.3)
PROTEIN UA: ABNORMAL
RBC # BLD: 4.42 M/UL (ref 3.95–5.11)
RBC # BLD: ABNORMAL 10*6/UL
RBC UA: ABNORMAL /HPF (ref 0–2)
RENAL EPITHELIAL, UA: ABNORMAL /HPF
SEG NEUTROPHILS: 77 % (ref 36–65)
SEGMENTED NEUTROPHILS ABSOLUTE COUNT: 5.99 K/UL (ref 1.5–8.1)
SODIUM BLD-SCNC: 136 MMOL/L (ref 135–144)
SPECIFIC GRAVITY UA: 1.02 (ref 1–1.03)
SPECIMEN DESCRIPTION: NORMAL
SPECIMEN DESCRIPTION: NORMAL
TOTAL PROTEIN: 6.9 G/DL (ref 6.4–8.3)
TRICHOMONAS: ABNORMAL
TURBIDITY: ABNORMAL
URINE HGB: NEGATIVE
UROBILINOGEN, URINE: NORMAL
WBC # BLD: 7.8 K/UL (ref 3.5–11.3)
WBC # BLD: ABNORMAL 10*3/UL
WBC UA: ABNORMAL /HPF (ref 0–5)
YEAST: ABNORMAL

## 2020-02-28 PROCEDURE — 96361 HYDRATE IV INFUSION ADD-ON: CPT

## 2020-02-28 PROCEDURE — 87449 NOS EACH ORGANISM AG IA: CPT

## 2020-02-28 PROCEDURE — 6360000002 HC RX W HCPCS: Performed by: STUDENT IN AN ORGANIZED HEALTH CARE EDUCATION/TRAINING PROGRAM

## 2020-02-28 PROCEDURE — 87510 GARDNER VAG DNA DIR PROBE: CPT

## 2020-02-28 PROCEDURE — 87324 CLOSTRIDIUM AG IA: CPT

## 2020-02-28 PROCEDURE — 80053 COMPREHEN METABOLIC PANEL: CPT

## 2020-02-28 PROCEDURE — 85025 COMPLETE CBC W/AUTO DIFF WBC: CPT

## 2020-02-28 PROCEDURE — 87591 N.GONORRHOEAE DNA AMP PROB: CPT

## 2020-02-28 PROCEDURE — 6370000000 HC RX 637 (ALT 250 FOR IP): Performed by: OBSTETRICS & GYNECOLOGY

## 2020-02-28 PROCEDURE — 87480 CANDIDA DNA DIR PROBE: CPT

## 2020-02-28 PROCEDURE — 83690 ASSAY OF LIPASE: CPT

## 2020-02-28 PROCEDURE — 96360 HYDRATION IV INFUSION INIT: CPT

## 2020-02-28 PROCEDURE — 87660 TRICHOMONAS VAGIN DIR PROBE: CPT

## 2020-02-28 PROCEDURE — 82150 ASSAY OF AMYLASE: CPT

## 2020-02-28 PROCEDURE — 2580000003 HC RX 258: Performed by: OBSTETRICS & GYNECOLOGY

## 2020-02-28 PROCEDURE — 99215 OFFICE O/P EST HI 40 MIN: CPT

## 2020-02-28 PROCEDURE — 87491 CHLMYD TRACH DNA AMP PROBE: CPT

## 2020-02-28 PROCEDURE — 81001 URINALYSIS AUTO W/SCOPE: CPT

## 2020-02-28 PROCEDURE — 87086 URINE CULTURE/COLONY COUNT: CPT

## 2020-02-28 PROCEDURE — 2580000003 HC RX 258: Performed by: STUDENT IN AN ORGANIZED HEALTH CARE EDUCATION/TRAINING PROGRAM

## 2020-02-28 RX ORDER — SODIUM CHLORIDE 9 MG/ML
INJECTION, SOLUTION INTRAVENOUS CONTINUOUS
Status: DISCONTINUED | OUTPATIENT
Start: 2020-02-28 | End: 2020-02-28 | Stop reason: HOSPADM

## 2020-02-28 RX ORDER — ONDANSETRON 4 MG/1
4 TABLET, FILM COATED ORAL DAILY PRN
Qty: 30 TABLET | Refills: 0 | Status: SHIPPED | OUTPATIENT
Start: 2020-02-28 | End: 2020-06-23

## 2020-02-28 RX ORDER — CEPHALEXIN 500 MG/1
500 CAPSULE ORAL 4 TIMES DAILY
Qty: 28 CAPSULE | Refills: 0 | Status: SHIPPED | OUTPATIENT
Start: 2020-02-28 | End: 2020-03-06

## 2020-02-28 RX ORDER — LOPERAMIDE HYDROCHLORIDE 2 MG/1
2 CAPSULE ORAL 4 TIMES DAILY PRN
Status: DISCONTINUED | OUTPATIENT
Start: 2020-02-28 | End: 2020-02-28 | Stop reason: HOSPADM

## 2020-02-28 RX ORDER — ACETAMINOPHEN 325 MG/1
650 TABLET ORAL EVERY 4 HOURS PRN
Status: DISCONTINUED | OUTPATIENT
Start: 2020-02-28 | End: 2020-02-28 | Stop reason: HOSPADM

## 2020-02-28 RX ORDER — ONDANSETRON 2 MG/ML
4 INJECTION INTRAMUSCULAR; INTRAVENOUS EVERY 6 HOURS PRN
Status: DISCONTINUED | OUTPATIENT
Start: 2020-02-28 | End: 2020-02-28 | Stop reason: HOSPADM

## 2020-02-28 RX ORDER — PROMETHAZINE HYDROCHLORIDE 25 MG/1
12.5 TABLET ORAL EVERY 6 HOURS PRN
Status: DISCONTINUED | OUTPATIENT
Start: 2020-02-28 | End: 2020-02-28 | Stop reason: HOSPADM

## 2020-02-28 RX ORDER — SODIUM CHLORIDE, SODIUM LACTATE, POTASSIUM CHLORIDE, AND CALCIUM CHLORIDE .6; .31; .03; .02 G/100ML; G/100ML; G/100ML; G/100ML
500 INJECTION, SOLUTION INTRAVENOUS ONCE
Status: DISCONTINUED | OUTPATIENT
Start: 2020-02-28 | End: 2020-02-28

## 2020-02-28 RX ORDER — 0.9 % SODIUM CHLORIDE 0.9 %
500 INTRAVENOUS SOLUTION INTRAVENOUS ONCE
Status: COMPLETED | OUTPATIENT
Start: 2020-02-28 | End: 2020-02-28

## 2020-02-28 RX ADMIN — SODIUM CHLORIDE 500 ML: 9 INJECTION, SOLUTION INTRAVENOUS at 08:50

## 2020-02-28 RX ADMIN — LOPERAMIDE HYDROCHLORIDE 2 MG: 2 CAPSULE ORAL at 14:57

## 2020-02-28 RX ADMIN — ONDANSETRON 4 MG: 2 INJECTION INTRAMUSCULAR; INTRAVENOUS at 14:57

## 2020-02-28 RX ADMIN — CEFAZOLIN 2 G: 10 INJECTION, POWDER, FOR SOLUTION INTRAVENOUS at 12:31

## 2020-02-28 RX ADMIN — ONDANSETRON 4 MG: 2 INJECTION INTRAMUSCULAR; INTRAVENOUS at 09:02

## 2020-02-28 RX ADMIN — SODIUM CHLORIDE: 9 INJECTION, SOLUTION INTRAVENOUS at 13:34

## 2020-02-28 NOTE — H&P
Medication Sig Start Date End Date Taking? Authorizing Provider   cephALEXin (KEFLEX) 500 MG capsule Take 1 capsule by mouth 4 times daily for 7 days 2/28/20 3/6/20 Yes Roz Ramirez, DO   ondansetron [facility]) 4 MG tablet Take 1 tablet by mouth daily as needed for Nausea or Vomiting 2/28/20  Yes Bryanna Millan, DO   metroNIDAZOLE (FLAGYL) 500 MG tablet Take 1 tablet by mouth 2 times daily for 10 days 2/24/20 3/5/20  Carolin Davis, DO   Prenatal Vit-Fe Fumarate-FA (PRENATAL VITAMIN AND MINERAL) 28-0.8 MG TABS Take 1 tablet by mouth daily 12/31/19   NAHUN Junior - CNP       FAMILY HISTORY:  family history includes Bipolar Disorder in her paternal uncle; Cancer in her paternal grandmother; Diabetes in her paternal grandfather; Hypertension in her father and paternal grandmother; Kidney Disease in her paternal grandfather; No Known Problems in her mother; Other in her paternal grandfather. SOCIAL HISTORY:   reports that she has never smoked. She has never used smokeless tobacco. She reports previous alcohol use. She reports that she does not use drugs.     VITALS:  Vitals:    02/28/20 0806 02/28/20 1500   BP: 115/69    Pulse: 83    Resp: 20    Temp: 98.2 °F (36.8 °C) 98.8 °F (37.1 °C)   TempSrc: Oral Oral       PHYSICAL EXAM:  FHT: 148 bpm       General appearance:  awake, alert, cooperative, no apparent distress, and appears stated age, no apparent distress, alert and cooperative  HEENT: head atraumatic, normocephalic, moist mucous membranes, trachea midline  Neurologic:  alert, oriented, normal speech, no focal findings or movement disorder noted  Lungs:  No increased work of breathing, good air exchange, clear to auscultation bilaterally, no crackles or wheezing  Heart:  Normal apical impulse, regular rate and rhythm, normal S1 and S2, no S3 or S4, and no murmur noted, regular rate and rhythm and no murmur    Abdomen:  soft, gravid, mild lower abdominal pain, no right upper quadrant tenderness, no CVA tenderness, uterus non-tender, no signs of abruption and no signs of chorioamnionitis  Extremities:  no cyanosis, clubbing or edema present  , no calf tenderness  Musculoskeletal: Gross strength equal and intact throughout, no gross abnormalities, range of motion normal in hips, knees, shoulders and spine  Psychiatric: Mood appropriate, normal affect     Pelvic Exam:  Vulva: normal appearing vulva, normal hair distribution , no masses, tenderness or lesions  Urethral Meatus: within normal limits   Vagina: normal mucosa, no abnormal lesions or lacerations   Cervix:  no cervical motion tenderness  Uterus: gravid non-tender    Speculum: normal appearing visually closed and thick cervix without discharge or lesions, no bleeding notes    PRENATAL LAB RESULTS:   Blood Type/Rh: B pos  Antibody Screen: negative  Hemoglobin, Hematocrit, Platelets: 13 / 88.4 / 335  Rubella: immune  T. Pallidum, IgG: negative   Hepatitis B Surface Antigen: non-reactive   HIV: negative   Sickle Cell Screen: not done  Gonorrhea: negative  Chlamydia: negative  Urine culture: GBS positive, date: 2/5/2020 and 1/6/20    1 hour Glucose Tolerance Test: 148  3 hour Glucose Tolerance Test: not completed    Group B Strep: bacteriuria  Cystic Fibrosis Screen: negative  First Trimester Screen: low risk  MSAFP/Multiple Markers: normal  Non-Invasive Prenatal Testing: not available  Anatomy US: normal anatomy, Complete Previa, 3VC, Posterior, Female     ASSESSMENT & PLAN:  Tea Gonzalez is a 36 y.o. female [id number] at 22w2d IUP   - GBS bacteriuria / Rh positive / R immune   - No indication for GBS prophylaxis   - VSS, Afebrile   - FHT: 148 bpm    Diarrhea/Nausea/Abdominal pain              - Afebrile              - SSE: normal appearing visually closed and thick cervix without discharge or lesions, no bleeding notes              - CBC, CMP, Lipase, amylase WNL   - c.diff Negative    - UA: Suspicious for UTI; Ucx pending.  Will give IV ancef and Rx for Keflex 500 QID x7 days    - IV Zofran for nausea   -  Vaginitis Probe: Negative    - Gc/C ordered              - IVF  ml/hr with a 1L bolus    - PO hydration encouraged when able    - Advance diet when able    - Imodium for diarrhea   - Rx for Zofran and Keflex were given     Patient is stable for discharge at this time. She reports improvement in symptoms with Imodium, Zofran, and IV fluids. She reports the ability to tolerate PO intake at this time. Symptoms are most likely viral. Her next OB apt is 3/24/20. Patient is aware to return to the hospital if symptoms worsen. Patient is aware that she should return to the hospital if she has worsening contractions, LOF, VB or decreased fetal movements. She voices understanding. Patient is aware that she should return to hospital if she experiences unrelenting headache, vision changes, RUQ pain, nausea, vomiting, and peripheral edema. She voices understanding.       Complete Previa     - Noted on Anatomy scan 20    Multigravid    Hx of alcohol abuse    Depression/Anxiety   - Stable no meds   - Denies SI/HI    Hx of pancreatitis   - CMP, lipase, and amylase WNL    Hx of TAB x2        Patient Active Problem List    Diagnosis Date Noted    Hx of forceps delivery in prior pregnancy, currently pregnant 2006 2019     Priority: High    HRP (high risk pregnancy), unspecified trimester 2020    Complete placenta previa 2020    Body mass index (bmi) 36.0-36.9, adult in pregnancy 2020 Early 1 hour GTT   testing at 32 weeks       GBS bacteriuria 2020 treated with Ampicillin      Elective  X 2 04/15/2019    History of alcohol abuse 2019    History of cholecystectomy 2019    Hx of pancreatitis 2017    ASCUS with positive high risk HPV cervical 2016    Atypical squamous cell changes of undetermined significance (ASCUS) on cervical cytology with positive high risk human

## 2020-02-29 LAB
CULTURE: NORMAL
Lab: NORMAL
SPECIMEN DESCRIPTION: NORMAL

## 2020-03-03 ENCOUNTER — TELEPHONE (OUTPATIENT)
Dept: OBGYN CLINIC | Age: 35
End: 2020-03-03

## 2020-03-24 ENCOUNTER — ROUTINE PRENATAL (OUTPATIENT)
Dept: OBGYN CLINIC | Age: 35
End: 2020-03-24

## 2020-03-24 VITALS
TEMPERATURE: 98.8 F | WEIGHT: 224 LBS | BODY MASS INDEX: 37.28 KG/M2 | SYSTOLIC BLOOD PRESSURE: 124 MMHG | DIASTOLIC BLOOD PRESSURE: 72 MMHG | HEART RATE: 95 BPM

## 2020-03-24 PROCEDURE — 0502F SUBSEQUENT PRENATAL CARE: CPT | Performed by: NURSE PRACTITIONER

## 2020-03-24 RX ORDER — LOPERAMIDE HYDROCHLORIDE 2 MG/1
2 CAPSULE ORAL
COMMUNITY
Start: 2020-02-28 | End: 2020-06-23

## 2020-03-24 RX ORDER — ACETAMINOPHEN 325 MG/1
650 TABLET ORAL
COMMUNITY
Start: 2020-02-28 | End: 2021-12-08

## 2020-03-24 RX ORDER — PROMETHAZINE HYDROCHLORIDE 12.5 MG/1
12.5 TABLET ORAL
COMMUNITY
Start: 2020-02-28 | End: 2020-06-23

## 2020-04-17 ENCOUNTER — TELEMEDICINE (OUTPATIENT)
Dept: OBGYN CLINIC | Age: 35
End: 2020-04-17

## 2020-04-17 PROCEDURE — 0502F SUBSEQUENT PRENATAL CARE: CPT | Performed by: OBSTETRICS & GYNECOLOGY

## 2020-04-17 NOTE — PROGRESS NOTES
Eze Zazueta (:  1985) has requested an audio/video evaluation for the following concern(s):    1. High-risk pregnancy in second trimester    2. Hx of forceps delivery in prior pregnancy, currently pregnant    3. Placenta previa antepartum    4. Body mass index (bmi) 36.0-36.9, adult    5. GBS bacteriuria    6. Elective     7. 27 weeks gestation of pregnancy    8. Abnormal glucose affecting pregnancy          TELEHEALTH EVALUATION -- Audio/Visual (During [facility]- public health emergency)            Eze Zazueta is a 36 y.o. female 27w4d    [id number]    OB History    Para Term  AB Living   5 2 2 0 2 2   SAB TAB Ectopic Molar Multiple Live Births   0 2 0   0        # Outcome Date GA Lbr Henrik/2nd Weight Sex Delivery Anes PTL Lv   5 Current            4 Term 02/15/09 39w0d 07:00 7 lb 12 oz (3.515 kg) F Vag-Spont None     3 Term 06 40w0d 07:00 8 lb 5 oz (3.771 kg) F Vag-Forceps None     2 TAB            1 TAB                Vitals         There was positive fetal movements. No contractions or leakage of fluid. Signs and symptoms of  labor as well as labor were reviewed. The S/S of Pre-Eclampsia were reviewed with the patient in detail. She is to report any of these if they occur. She currently denies any of these. The patient had her 28 week labs ordered. 19 - +BV, will need flagyl after 15 weeks per Hannah Liz  2020  testing at 32 weeks        T-Dap Vaccine (27-36 weeks): awaiting    The patient was instructed on fetal kick counts and was given a kick sheet to complete every 8 hours. She was instructed that the baby should move at a minimum of ten times within one hour after a meal. The patient was instructed to lay down on her left side twenty minutes after eating and count movements for up to one hour with a target value of ten movements.   She was instructed to notify the office if she did not make that target after two attempts or if after any attempt there was less than four movements. The patient reports that the targets have been made Yes.  Testin weeks    Assessment:  Niesha Turner is a 36 y.o. female  2. [id number]  3. 27w4d    Patient Active Problem List    Diagnosis Date Noted    HRP (high risk pregnancy), unspecified trimester 2020     Priority: High    Placenta previa 2020     Priority: High     3/2/2020 no heavy lifting and pelvic pain  Advise re-evaluation of placental location with advancing gestational age     Boo GBS bacteriuria 2020     Priority: High     2020 treated with Ampicillin      Hx of forceps delivery in prior pregnancy, currently pregnant 2019     Priority: High    Body mass index (bmi) 36.0-36.9, adult 2020 Early 1 hour GTT   testing at 32 weeks       Elective  X 2 04/15/2019    History of alcohol abuse 2019    History of cholecystectomy 2019    Hx of pancreatitis 2017    ASCUS with positive high risk HPV cervical 2016    Atypical squamous cell changes of undetermined significance (ASCUS) on cervical cytology with positive high risk human papilloma virus (HPV) 2016    HPV in female 2016    Migraine without aura 2013    Depression/Anxiety 2013        Diagnosis Orders   1. High-risk pregnancy in second trimester  CBC Auto Differential    Urinalysis    GLUCOSE CHIDI. 3 HR    Hemoglobin A1C   2. Hx of forceps delivery in prior pregnancy, currently pregnant     3. Placenta previa antepartum     4. Body mass index (bmi) 36.0-36.9, adult     5. GBS bacteriuria     6. Elective      7. 27 weeks gestation of pregnancy  CBC Auto Differential    Urinalysis    GLUCOSE CHIDI. 3 HR    Hemoglobin A1C   8. Abnormal glucose affecting pregnancy  CBC Auto Differential    Urinalysis    GLUCOSE CHIDI.  3 HR    Hemoglobin A1C             Plan:  The patient will return to the office for her next bacteriuria     6. Elective      7. 27 weeks gestation of pregnancy  CBC Auto Differential    Urinalysis    GLUCOSE CHIDI. 3 HR    Hemoglobin A1C   8. Abnormal glucose affecting pregnancy  CBC Auto Differential    Urinalysis    GLUCOSE CHIDI. 3 HR    Hemoglobin A1C    and her options. She was also counseled on her preventative health maintenance recommendations and follow-up.

## 2020-04-23 ENCOUNTER — ROUTINE PRENATAL (OUTPATIENT)
Dept: PERINATAL CARE | Age: 35
End: 2020-04-23
Payer: COMMERCIAL

## 2020-04-23 VITALS
DIASTOLIC BLOOD PRESSURE: 69 MMHG | RESPIRATION RATE: 16 BRPM | WEIGHT: 228 LBS | HEIGHT: 65 IN | SYSTOLIC BLOOD PRESSURE: 110 MMHG | BODY MASS INDEX: 37.99 KG/M2 | TEMPERATURE: 98.3 F | HEART RATE: 89 BPM

## 2020-04-23 LAB
ABDOMINAL CIRCUMFERENCE: NORMAL
BIPARIETAL DIAMETER: NORMAL
ESTIMATED FETAL WEIGHT: NORMAL
FEMORAL DIAMETER: NORMAL
HC/AC: NORMAL
HEAD CIRCUMFERENCE: NORMAL

## 2020-04-23 PROCEDURE — 76805 OB US >/= 14 WKS SNGL FETUS: CPT | Performed by: OBSTETRICS & GYNECOLOGY

## 2020-04-23 PROCEDURE — 76819 FETAL BIOPHYS PROFIL W/O NST: CPT | Performed by: OBSTETRICS & GYNECOLOGY

## 2020-04-23 PROCEDURE — 76817 TRANSVAGINAL US OBSTETRIC: CPT | Performed by: OBSTETRICS & GYNECOLOGY

## 2020-04-24 PROBLEM — O44.00 PLACENTA PREVIA: Status: RESOLVED | Noted: 2020-02-28 | Resolved: 2020-04-24

## 2020-04-30 ENCOUNTER — TELEPHONE (OUTPATIENT)
Dept: OBGYN CLINIC | Age: 35
End: 2020-04-30

## 2020-05-04 ENCOUNTER — ROUTINE PRENATAL (OUTPATIENT)
Dept: OBGYN CLINIC | Age: 35
End: 2020-05-04

## 2020-05-04 VITALS
BODY MASS INDEX: 38.27 KG/M2 | WEIGHT: 230 LBS | HEART RATE: 97 BPM | DIASTOLIC BLOOD PRESSURE: 66 MMHG | SYSTOLIC BLOOD PRESSURE: 116 MMHG

## 2020-05-04 PROCEDURE — 0502F SUBSEQUENT PRENATAL CARE: CPT | Performed by: OBSTETRICS & GYNECOLOGY

## 2020-05-04 NOTE — PROGRESS NOTES
k/uL Final    RBC 02/28/2020 4.42  3.95 - 5.11 m/uL Final    Hemoglobin 02/28/2020 11.1* 11.9 - 15.1 g/dL Final    Hematocrit 02/28/2020 34.7* 36.3 - 47.1 % Final    MCV 02/28/2020 78.5* 82.6 - 102.9 fL Final    MCH 02/28/2020 25.1* 25.2 - 33.5 pg Final    MCHC 02/28/2020 32.0  28.4 - 34.8 g/dL Final    RDW 02/28/2020 13.8  11.8 - 14.4 % Final    Platelets 04/55/8098 322  138 - 453 k/uL Final    MPV 02/28/2020 11.1  8.1 - 13.5 fL Final    NRBC Automated 02/28/2020 0.0  0.0 per 100 WBC Final    Differential Type 02/28/2020 NOT REPORTED   Final    Seg Neutrophils 02/28/2020 77* 36 - 65 % Final    Lymphocytes 02/28/2020 14* 24 - 43 % Final    Monocytes 02/28/2020 7  3 - 12 % Final    Eosinophils % 02/28/2020 1  1 - 4 % Final    Basophils 02/28/2020 0  0 - 2 % Final    Immature Granulocytes 02/28/2020 1* 0 % Final    Segs Absolute 02/28/2020 5.99  1.50 - 8.10 k/uL Final    Absolute Lymph # 02/28/2020 1.09* 1.10 - 3.70 k/uL Final    Absolute Mono # 02/28/2020 0.56  0.10 - 1.20 k/uL Final    Absolute Eos # 02/28/2020 0.07  0.00 - 0.44 k/uL Final    Basophils Absolute 02/28/2020 <0.03  0.00 - 0.20 k/uL Final    Absolute Immature Granulocyte 02/28/2020 0.07  0.00 - 0.30 k/uL Final    WBC Morphology 02/28/2020 NOT REPORTED   Final    RBC Morphology 02/28/2020 MICROCYTOSIS PRESENT   Final    Platelet Estimate 62/02/4022 NOT REPORTED   Final    Glucose 02/28/2020 83  70 - 99 mg/dL Final    BUN 02/28/2020 7  6 - 20 mg/dL Final    CREATININE 02/28/2020 0.36* 0.50 - 0.90 mg/dL Final    Bun/Cre Ratio 02/28/2020 NOT REPORTED  9 - 20 Final    Calcium 02/28/2020 8.7  8.6 - 10.4 mg/dL Final    Sodium 02/28/2020 136  135 - 144 mmol/L Final    Potassium 02/28/2020 3.7  3.7 - 5.3 mmol/L Final    Chloride 02/28/2020 103  98 - 107 mmol/L Final    CO2 02/28/2020 20  20 - 31 mmol/L Final    Anion Gap 02/28/2020 13  9 - 17 mmol/L Final    Alkaline Phosphatase 02/28/2020 62  35 - 104 U/L Final    ALT

## 2020-05-18 ENCOUNTER — ROUTINE PRENATAL (OUTPATIENT)
Dept: OBGYN CLINIC | Age: 35
End: 2020-05-18
Payer: COMMERCIAL

## 2020-05-18 VITALS
WEIGHT: 237 LBS | TEMPERATURE: 97.4 F | SYSTOLIC BLOOD PRESSURE: 124 MMHG | HEART RATE: 96 BPM | BODY MASS INDEX: 39.44 KG/M2 | DIASTOLIC BLOOD PRESSURE: 84 MMHG

## 2020-05-18 PROBLEM — M72.2 PLANTAR FASCIAL FIBROMATOSIS: Status: ACTIVE | Noted: 2020-05-18

## 2020-05-18 PROBLEM — O09.90 HIGH-RISK PREGNANCY: Status: ACTIVE | Noted: 2020-02-28

## 2020-05-18 PROBLEM — B35.4 TINEA CORPORIS: Status: ACTIVE | Noted: 2017-02-08

## 2020-05-18 PROBLEM — R10.13 EPIGASTRIC PAIN: Status: ACTIVE | Noted: 2019-01-14

## 2020-05-18 PROBLEM — F10.10 ALCOHOL ABUSE: Status: ACTIVE | Noted: 2017-02-08

## 2020-05-18 PROBLEM — E66.9 OBESITY WITH BODY MASS INDEX 30 OR GREATER: Status: ACTIVE | Noted: 2020-01-09

## 2020-05-18 PROCEDURE — 90471 IMMUNIZATION ADMIN: CPT | Performed by: NURSE PRACTITIONER

## 2020-05-18 PROCEDURE — 90715 TDAP VACCINE 7 YRS/> IM: CPT | Performed by: NURSE PRACTITIONER

## 2020-05-18 PROCEDURE — 0502F SUBSEQUENT PRENATAL CARE: CPT | Performed by: NURSE PRACTITIONER

## 2020-05-18 NOTE — PROGRESS NOTES
Hussein Grace is a 36 y.o. female 32w0d    [id number]    OB History    Para Term  AB Living   5 2 2 0 2 2   SAB TAB Ectopic Molar Multiple Live Births   0 2 0   0        # Outcome Date GA Lbr Henrik/2nd Weight Sex Delivery Anes PTL Lv   5 Current            4 Term 02/15/09 39w0d 07:00 7 lb 12 oz (3.515 kg) F Vag-Spont None     3 Term 06 40w0d 07:00 8 lb 5 oz (3.771 kg) F Vag-Forceps None     2 TAB            1 TAB                Vitals  BP: 124/84  Weight: 237 lb (107.5 kg)  Pulse: 96  Patient Position: Sitting  Albumin: 1+  Glucose: Negative  Fundal Height (cm): 32 cm  Fetal Heart Rate: 150  Movement: Present      The patient was seen and evaluated. There was positive fetal movements. No contractions or leakage of fluid. Signs and symptoms of  labor as well as labor were reviewed. The S/S of Pre-Eclampsia were reviewed with the patient in detail. She is to report any of these if they occur. She currently denies any of these. The patient had her 28 week labs ordered. Patient to complete 3 hour GTT and Hgb A1c. No visits with results within 5 Week(s) from this visit.    Latest known visit with results is:   Admission on 2020, Discharged on 2020   Component Date Value Ref Range Status    Color, UA 2020 DARK YELLOW* YELLOW Final    Turbidity UA 2020 TURBID* CLEAR Final    Glucose, Ur 2020 NEGATIVE  NEGATIVE Final    Bilirubin Urine 2020 NEGATIVE  NEGATIVE Final    Ketones, Urine 2020 TRACE* NEGATIVE Final    Specific [place], UA 2020 1.024  1.005 - 1.030 Final    Urine Hgb 2020 NEGATIVE  NEGATIVE Final    pH, UA 2020 5.5  5.0 - 8.0 Final    Protein, UA 2020 TRACE* NEGATIVE Final    Urobilinogen, Urine 2020 Normal  Normal Final    Nitrite, Urine 2020 NEGATIVE  NEGATIVE Final    Leukocyte Esterase, Urine 2020 MODERATE* NEGATIVE Final    Urinalysis Comments 2020 Culture ordered based Alkaline Phosphatase 02/28/2020 62  35 - 104 U/L Final    ALT 02/28/2020 15  5 - 33 U/L Final    AST 02/28/2020 17  <32 U/L Final    Total Bilirubin 02/28/2020 0.29* 0.3 - 1.2 mg/dL Final    Total Protein 02/28/2020 6.9  6.4 - 8.3 g/dL Final    Alb 02/28/2020 3.6  3.5 - 5.2 g/dL Final    Albumin/Globulin Ratio 02/28/2020 1.1  1.0 - 2.5 Final    GFR Non- 02/28/2020 >60  >60 mL/min Final    GFR  02/28/2020 >60  >60 mL/min Final    GFR Comment 02/28/2020        Final    Comment: Average GFR for 30-36 years old:   107 mL/min/1.73sq m  Chronic Kidney Disease:   <60 mL/min/1.73sq m  Kidney failure:   <15 mL/min/1.73sq m              eGFR calculated using average adult body mass. Additional eGFR calculator available at:        [employer]            GFR Staging 02/28/2020 NOT REPORTED   Final    - 02/28/2020        Final    WBC, UA 02/28/2020 50   0 - 5 /HPF Final    RBC, UA 02/28/2020 None  0 - 2 /HPF Final    Casts UA 02/28/2020 NOT REPORTED  0 - 2 /LPF Final    Crystals, UA 02/28/2020 NOT REPORTED  None /HPF Final    Epithelial Cells UA 02/28/2020 50   0 - 5 /HPF Final    Renal Epithelial, UA 02/28/2020 NOT REPORTED  0 /HPF Final    Bacteria, UA 02/28/2020 MODERATE* None Final    Mucus, UA 02/28/2020 1+* None Final    Trichomonas, UA 02/28/2020 NOT REPORTED  None Final    Amorphous, UA 02/28/2020 NOT REPORTED  None Final    Other Observations UA 02/28/2020 NOT REPORTED  NOT REQ. Final    Yeast, UA 02/28/2020 NOT REPORTED  None Final    Specimen Description 02/28/2020 . FECES   Final    C DIFF AG + TOXIN 02/28/2020 NEGATIVE  NEGATIVE Final    No C. difficile antigen and Toxin Detected.  Amylase 02/28/2020 63  28 - 100 U/L Final    Lipase 02/28/2020 37  13 - 60 U/L Final    Specimen Description 02/28/2020 . URINE   Final    Special Requests 02/28/2020 NOT REPORTED   Final    Culture 02/28/2020 NO SIGNIFICANT GROWTH   Final    Specimen Description 02/28/2020 . VAGINA   Final    Special Requests 02/28/2020 NOT REPORTED   Final    Direct Exam 02/28/2020 NEGATIVE for Candida sp. Final    Direct Exam 02/28/2020 NEGATIVE for Gardnerella vaginalis   Final    Direct Exam 02/28/2020 NEGATIVE for Trichomonas vaginalis   Final    Direct Exam 02/28/2020 Method of testing is a DNA probe intended for detection and identification of Candida species, Gardnerella vaginalis, and Trichomonas vaginalis nucleic acid in vaginal fluid specimens from patients with symptoms of vaginitis/vaginosis. Final    Specimen Description 02/28/2020 . CERVIX   Final    C. trachomatis DNA 02/28/2020 NEGATIVE  NEGATIVE Final    Comment: CHLAMYDIA TRACHOMATIS DNA not detected by nucleic acid amplification. This test is intended for medical purposes only and is not valid for the evaluation of   suspected sexual abuse or for other forensic purposes. In certain contexts, culture may be required to meet applicable laws and regulations for   diagnosis of C. trachomatis and N. gonorrhoeae infections. Per 2014  CDC recommendations, this test does not include confirmation of positive results   by an alternative nucleic acid target.  N. gonorrhoeae DNA 02/28/2020 NEGATIVE  NEGATIVE Final    Comment: NEISSERIA GONORRHOEAE DNA not detected by nucleic acid amplification. This test is intended for medical purposes only and is not valid for the evaluation of   suspected sexual abuse or for other forensic purposes. In certain contexts, culture may be required to meet applicable laws and regulations for   diagnosis of C. trachomatis and N. gonorrhoeae infections.   Per 2014  CDC recommendations, this test does not include confirmation of positive results   by an alternative nucleic acid target.     ]    5/18/20 tdap given in office  1/2/19 - +BV, will need flagyl after 15 weeks per [facility]  4/23/2020 advise fetal kick counts instead of NSTs from 32 weeks per [facility] Guidance for COVID-19. T-Dap Vaccine (27-36 weeks): completed    The patient was instructed on fetal kick counts and was given a kick sheet to complete every 8 hours. She was instructed that the baby should move at a minimum of ten times within one hour after a meal. The patient was instructed to lay down on her left side twenty minutes after eating and count movements for up to one hour with a target value of ten movements. She was instructed to notify the office if she did not make that target after two attempts or if after any attempt there was less than four movements. The patient reports that the targets have been made Yes.  Testing:  Patient to do KSP instead of  testing due to COVID 19. Assessment:  1. Luz Elena Phillips is a 36 y.o. female  2. [id number]  3. 32w0d    Patient Active Problem List    Diagnosis Date Noted    Body mass index (bmi) 36.0-36.9, adult 2020     Priority: High     2020 Early 1 hour GTT  2020 advise fetal kick counts instead of NSTs from 32 weeks per [facility] Guidance for COVID-19.         GBS bacteriuria 2020     Priority: High     2020 treated with Ampicillin      Hx of forceps delivery in prior pregnancy, currently pregnant 2019     Priority: High    Elective  X 2 04/15/2019     Priority: High    Plantar fascial fibromatosis 2020    HRP (high risk pregnancy), unspecified trimester 2020    High-risk pregnancy 2020    Obesity with body mass index 30 or greater 2020    History of alcohol abuse 2019    History of cholecystectomy 2019    Epigastric pain 2019    Hx of pancreatitis 2017    Alcohol abuse 2017    Tinea corporis 2017    ASCUS with positive high risk HPV cervical 2016    Atypical squamous cell changes of undetermined significance (ASCUS) on cervical cytology with positive high risk human papilloma virus (HPV) 2016    Cervical atypism 2016    HPV in female 2016    Microscopic hematuria 2014    Disorder of lung 2014    Cholelithiasis and cholecystitis without obstruction 2014    Migraine without aura 2013    Headache 2013    Depression/Anxiety 2013    Overweight 2013        Diagnosis Orders   1. 32 weeks gestation of pregnancy  Tdap (age 6y and older) IM (Boostrix)    US OB 1 or More Fetus Limited - Clinic Performed   2. Hx of forceps delivery in prior pregnancy, currently pregnant     3. Body mass index (bmi) 36.0-36.9, adult     4. GBS bacteriuria     5. Elective      6. Need for diphtheria-tetanus-pertussis (Tdap) vaccine  Tdap (age 6y and older) IM (Boostrix)   7. Uterine size-date discrepancy, antepartum  US OB 1 or More Fetus Limited - Clinic Performed             Plan:  The patient will return to the office for her next visit in 2 weeks. See antepartum flow sheet. TDAP vaccine given. Patient instructed on fetal kick counts BID instead of  testing. Lab slips for 3 hour GTT, Hgb A1c, CBC and UA reprinted- to complete soon. Ultrasound fetal growth next week. Counseling on Fetal Movement Kick Counts: The patient was instructed on fetal kick counts and was given a kick sheet to complete every 8 hours. She was instructed that the baby should move at a minimum of ten times within one hour after a meal. The patient was instructed to lay down on her left side twenty minutes after eating and count movements for up to one hour with a target value of ten movements. She was instructed to notify the office if she did not make that target after two attempts or if after any attempt there was less than four movements. The patient reports that the targets have been made.  Testing Indicated: No  Scheduled with Nursing-Pt notified: No- patient to complete fetal kick counts instead of NST- due to COVID 19.

## 2020-05-27 ENCOUNTER — OFFICE VISIT (OUTPATIENT)
Dept: OBGYN CLINIC | Age: 35
End: 2020-05-27
Payer: COMMERCIAL

## 2020-05-27 LAB
ABDOMINAL CIRCUMFERENCE: NORMAL
BIPARIETAL DIAMETER: NORMAL
ESTIMATED FETAL WEIGHT: NORMAL
FEMORAL DIAMETER: NORMAL
FEMORAL LENGTH: NORMAL
HC/AC: NORMAL
HEAD CIRCUMFERENCE: NORMAL

## 2020-05-27 PROCEDURE — 76815 OB US LIMITED FETUS(S): CPT | Performed by: OBSTETRICS & GYNECOLOGY

## 2020-06-04 ENCOUNTER — ROUTINE PRENATAL (OUTPATIENT)
Dept: OBGYN CLINIC | Age: 35
End: 2020-06-04

## 2020-06-04 VITALS
BODY MASS INDEX: 38.94 KG/M2 | WEIGHT: 234 LBS | DIASTOLIC BLOOD PRESSURE: 76 MMHG | HEART RATE: 93 BPM | SYSTOLIC BLOOD PRESSURE: 114 MMHG

## 2020-06-04 PROCEDURE — 0502F SUBSEQUENT PRENATAL CARE: CPT | Performed by: OBSTETRICS & GYNECOLOGY

## 2020-06-04 RX ORDER — LANSOPRAZOLE 15 MG/1
15 CAPSULE, DELAYED RELEASE ORAL DAILY
Qty: 30 CAPSULE | Refills: 1 | Status: SHIPPED | OUTPATIENT
Start: 2020-06-04 | End: 2020-06-23

## 2020-06-04 RX ORDER — FAMOTIDINE 20 MG/1
20 TABLET, FILM COATED ORAL 2 TIMES DAILY
Qty: 60 TABLET | Refills: 1 | Status: SHIPPED | OUTPATIENT
Start: 2020-06-04 | End: 2020-07-17

## 2020-06-04 NOTE — PROGRESS NOTES
Suman Ernst is a 36 y.o. female 34w3d    [id number]    OB History    Para Term  AB Living   5 2 2 0 2 2   SAB TAB Ectopic Molar Multiple Live Births   0 2 0   0        # Outcome Date GA Lbr Henrik/2nd Weight Sex Delivery Anes PTL Lv   5 Current            4 Term 02/15/09 39w0d 07:00 7 lb 12 oz (3.515 kg) F Vag-Spont None     3 Term 06 40w0d 07:00 8 lb 5 oz (3.771 kg) F Vag-Forceps None     2 TAB            1 TAB                Vitals  BP: 114/76  Weight: 234 lb (106.1 kg)  Pulse: 93  Patient Position: Sitting  Albumin: Negative  Glucose: Negative      The patient was seen and evaluated. There was positive fetal movements. No contractions or leakage of fluid. Signs and symptoms of  labor as well as labor were reviewed. The S/S of Pre-Eclampsia were reviewed with the patient in detail. She is to report any of these if they occur. She currently denies any of these. The patient had her 28 week labs ordered. Pt had elevated 1 hour GTT and has not gotten her 3 hour GTT done. Pt counseled on importance of 3 hour GTT  No visits with results within 5 Week(s) from this visit.    Latest known visit with results is:   Admission on 2020, Discharged on 2020   Component Date Value Ref Range Status    Color, UA 2020 DARK YELLOW* YELLOW Final    Turbidity UA 2020 TURBID* CLEAR Final    Glucose, Ur 2020 NEGATIVE  NEGATIVE Final    Bilirubin Urine 2020 NEGATIVE  NEGATIVE Final    Ketones, Urine 2020 TRACE* NEGATIVE Final    Specific [place], UA 2020 1.024  1.005 - 1.030 Final    Urine Hgb 2020 NEGATIVE  NEGATIVE Final    pH, UA 2020 5.5  5.0 - 8.0 Final    Protein, UA 2020 TRACE* NEGATIVE Final    Urobilinogen, Urine 2020 Normal  Normal Final    Nitrite, Urine 2020 NEGATIVE  NEGATIVE Final    Leukocyte Esterase, Urine 2020 MODERATE* NEGATIVE Final    Urinalysis Comments 2020 Culture ordered based Alkaline Phosphatase 02/28/2020 62  35 - 104 U/L Final    ALT 02/28/2020 15  5 - 33 U/L Final    AST 02/28/2020 17  <32 U/L Final    Total Bilirubin 02/28/2020 0.29* 0.3 - 1.2 mg/dL Final    Total Protein 02/28/2020 6.9  6.4 - 8.3 g/dL Final    Alb 02/28/2020 3.6  3.5 - 5.2 g/dL Final    Albumin/Globulin Ratio 02/28/2020 1.1  1.0 - 2.5 Final    GFR Non- 02/28/2020 >60  >60 mL/min Final    GFR  02/28/2020 >60  >60 mL/min Final    GFR Comment 02/28/2020        Final    Comment: Average GFR for 30-36 years old:   107 mL/min/1.73sq m  Chronic Kidney Disease:   <60 mL/min/1.73sq m  Kidney failure:   <15 mL/min/1.73sq m              eGFR calculated using average adult body mass. Additional eGFR calculator available at:        [email or web address]            GFR Staging 02/28/2020 NOT REPORTED   Final    - 02/28/2020        Final    WBC, UA 02/28/2020 50   0 - 5 /HPF Final    RBC, UA 02/28/2020 None  0 - 2 /HPF Final    Casts UA 02/28/2020 NOT REPORTED  0 - 2 /LPF Final    Crystals, UA 02/28/2020 NOT REPORTED  None /HPF Final    Epithelial Cells UA 02/28/2020 50   0 - 5 /HPF Final    Renal Epithelial, UA 02/28/2020 NOT REPORTED  0 /HPF Final    Bacteria, UA 02/28/2020 MODERATE* None Final    Mucus, UA 02/28/2020 1+* None Final    Trichomonas, UA 02/28/2020 NOT REPORTED  None Final    Amorphous, UA 02/28/2020 NOT REPORTED  None Final    Other Observations UA 02/28/2020 NOT REPORTED  NOT REQ. Final    Yeast, UA 02/28/2020 NOT REPORTED  None Final    Specimen Description 02/28/2020 . FECES   Final    C DIFF AG + TOXIN 02/28/2020 NEGATIVE  NEGATIVE Final    No C. difficile antigen and Toxin Detected.  Amylase 02/28/2020 63  28 - 100 U/L Final    Lipase 02/28/2020 37  13 - 60 U/L Final    Specimen Description 02/28/2020 . URINE   Final    Special Requests 02/28/2020 NOT REPORTED   Final    Culture 02/28/2020 NO SIGNIFICANT

## 2020-06-17 ENCOUNTER — ROUTINE PRENATAL (OUTPATIENT)
Dept: OBGYN CLINIC | Age: 35
End: 2020-06-17

## 2020-06-17 ENCOUNTER — HOSPITAL ENCOUNTER (OUTPATIENT)
Age: 35
Setting detail: SPECIMEN
Discharge: HOME | End: 2020-06-17
Payer: COMMERCIAL

## 2020-06-17 VITALS
WEIGHT: 234 LBS | HEART RATE: 117 BPM | BODY MASS INDEX: 38.94 KG/M2 | TEMPERATURE: 97.2 F | SYSTOLIC BLOOD PRESSURE: 126 MMHG | DIASTOLIC BLOOD PRESSURE: 78 MMHG

## 2020-06-17 PROCEDURE — 0502F SUBSEQUENT PRENATAL CARE: CPT | Performed by: NURSE PRACTITIONER

## 2020-06-17 PROCEDURE — 87081 CULTURE SCREEN ONLY: CPT

## 2020-06-17 PROCEDURE — 86403 PARTICLE AGGLUT ANTBDY SCRN: CPT

## 2020-06-17 NOTE — PROGRESS NOTES
- 0.20 k/uL Final    Absolute Immature Granulocyte 02/28/2020 0.07  0.00 - 0.30 k/uL Final    WBC Morphology 02/28/2020 NOT REPORTED   Final    RBC Morphology 02/28/2020 MICROCYTOSIS PRESENT   Final    Platelet Estimate 13/35/2748 NOT REPORTED   Final    Glucose 02/28/2020 83  70 - 99 mg/dL Final    BUN 02/28/2020 7  6 - 20 mg/dL Final    CREATININE 02/28/2020 0.36* 0.50 - 0.90 mg/dL Final    Bun/Cre Ratio 02/28/2020 NOT REPORTED  9 - 20 Final    Calcium 02/28/2020 8.7  8.6 - 10.4 mg/dL Final    Sodium 02/28/2020 136  135 - 144 mmol/L Final    Potassium 02/28/2020 3.7  3.7 - 5.3 mmol/L Final    Chloride 02/28/2020 103  98 - 107 mmol/L Final    CO2 02/28/2020 20  20 - 31 mmol/L Final    Anion Gap 02/28/2020 13  9 - 17 mmol/L Final    Alkaline Phosphatase 02/28/2020 62  35 - 104 U/L Final    ALT 02/28/2020 15  5 - 33 U/L Final    AST 02/28/2020 17  <32 U/L Final    Total Bilirubin 02/28/2020 0.29* 0.3 - 1.2 mg/dL Final    Total Protein 02/28/2020 6.9  6.4 - 8.3 g/dL Final    Alb 02/28/2020 3.6  3.5 - 5.2 g/dL Final    Albumin/Globulin Ratio 02/28/2020 1.1  1.0 - 2.5 Final    GFR Non- 02/28/2020 >60  >60 mL/min Final    GFR  02/28/2020 >60  >60 mL/min Final    GFR Comment 02/28/2020        Final    Comment: Average GFR for 30-36 years old:   80 mL/min/1.73sq m  Chronic Kidney Disease:   <60 mL/min/1.73sq m  Kidney failure:   <15 mL/min/1.73sq m              eGFR calculated using average adult body mass.  Additional eGFR calculator available at:        [email or web address]            GFR Staging 02/28/2020 NOT REPORTED   Final    - 02/28/2020        Final    WBC, UA 02/28/2020 50   0 - 5 /HPF Final    RBC, UA 02/28/2020 None  0 - 2 /HPF Final    Casts UA 02/28/2020 NOT REPORTED  0 - 2 /LPF Final    Crystals, UA 02/28/2020 NOT REPORTED  None /HPF Final    Epithelial Cells UA 02/28/2020 50   0 - 5 /HPF Final    Renal

## 2020-06-20 LAB
CULTURE: ABNORMAL
Lab: ABNORMAL
SPECIMEN DESCRIPTION: ABNORMAL

## 2020-06-22 ENCOUNTER — OFFICE VISIT (OUTPATIENT)
Dept: OBGYN CLINIC | Age: 35
End: 2020-06-22
Payer: COMMERCIAL

## 2020-06-22 PROBLEM — O99.820 GBS (GROUP B STREPTOCOCCUS CARRIER), +RV CULTURE, CURRENTLY PREGNANT: Status: ACTIVE | Noted: 2020-06-22

## 2020-06-22 PROCEDURE — 76816 OB US FOLLOW-UP PER FETUS: CPT | Performed by: OBSTETRICS & GYNECOLOGY

## 2020-06-23 ENCOUNTER — TELEPHONE (OUTPATIENT)
Dept: OBGYN CLINIC | Age: 35
End: 2020-06-23

## 2020-06-23 ENCOUNTER — HOSPITAL ENCOUNTER (OUTPATIENT)
Age: 35
Discharge: HOME | End: 2020-06-23
Payer: COMMERCIAL

## 2020-06-23 ENCOUNTER — ROUTINE PRENATAL (OUTPATIENT)
Dept: OBGYN CLINIC | Age: 35
End: 2020-06-23

## 2020-06-23 VITALS
WEIGHT: 236 LBS | DIASTOLIC BLOOD PRESSURE: 76 MMHG | SYSTOLIC BLOOD PRESSURE: 120 MMHG | BODY MASS INDEX: 39.27 KG/M2 | HEART RATE: 102 BPM

## 2020-06-23 PROBLEM — O36.60X0 LGA (LARGE FOR GESTATIONAL AGE) FETUS AFFECTING MANAGEMENT OF MOTHER: Status: ACTIVE | Noted: 2020-06-23

## 2020-06-23 LAB
-: ABNORMAL
ABSOLUTE EOS #: 0.1 K/UL (ref 0–0.4)
ABSOLUTE IMMATURE GRANULOCYTE: ABNORMAL K/UL (ref 0–0.3)
ABSOLUTE LYMPH #: 1.9 K/UL (ref 1–4.8)
ABSOLUTE MONO #: 0.8 K/UL (ref 0.1–1.3)
AMORPHOUS: ABNORMAL
BACTERIA: ABNORMAL
BASOPHILS # BLD: 0 % (ref 0–2)
BASOPHILS ABSOLUTE: 0 K/UL (ref 0–0.2)
BILIRUBIN URINE: ABNORMAL
CASTS UA: ABNORMAL /LPF
COLOR: ABNORMAL
COMMENT UA: ABNORMAL
CRYSTALS, UA: ABNORMAL /HPF
DIFFERENTIAL TYPE: ABNORMAL
EOSINOPHILS RELATIVE PERCENT: 1 % (ref 0–4)
EPITHELIAL CELLS UA: ABNORMAL /HPF
ESTIMATED AVERAGE GLUCOSE: 120 MG/DL
GLUCOSE BLD-MCNC: 93 MG/DL (ref 70–99)
GLUCOSE URINE: NEGATIVE
HBA1C MFR BLD: 5.8 % (ref 4–6)
HCT VFR BLD CALC: 35.8 % (ref 36–46)
HEMOGLOBIN: 11.4 G/DL (ref 12–16)
IMMATURE GRANULOCYTES: ABNORMAL %
KETONES, URINE: NEGATIVE
LEUKOCYTE ESTERASE, URINE: ABNORMAL
LYMPHOCYTES # BLD: 17 % (ref 24–44)
MCH RBC QN AUTO: 23.9 PG (ref 26–34)
MCHC RBC AUTO-ENTMCNC: 31.8 G/DL (ref 31–37)
MCV RBC AUTO: 75.1 FL (ref 80–100)
MONOCYTES # BLD: 7 % (ref 1–7)
MUCUS: ABNORMAL
NITRITE, URINE: NEGATIVE
NRBC AUTOMATED: ABNORMAL PER 100 WBC
OTHER OBSERVATIONS UA: ABNORMAL
PDW BLD-RTO: 14.3 % (ref 11.5–14.9)
PH UA: 6.5 (ref 5–8)
PLATELET # BLD: 374 K/UL (ref 150–450)
PLATELET ESTIMATE: ABNORMAL
PMV BLD AUTO: 8.8 FL (ref 6–12)
PROTEIN UA: ABNORMAL
RBC # BLD: 4.77 M/UL (ref 4–5.2)
RBC # BLD: ABNORMAL 10*6/UL
RBC UA: ABNORMAL /HPF
RENAL EPITHELIAL, UA: ABNORMAL /HPF
SEG NEUTROPHILS: 75 % (ref 36–66)
SEGMENTED NEUTROPHILS ABSOLUTE COUNT: 8.4 K/UL (ref 1.3–9.1)
SPECIFIC GRAVITY UA: 1.02 (ref 1–1.03)
TRICHOMONAS: ABNORMAL
TURBIDITY: ABNORMAL
URINE HGB: NEGATIVE
UROBILINOGEN, URINE: NORMAL
WBC # BLD: 11.3 K/UL (ref 3.5–11)
WBC # BLD: ABNORMAL 10*3/UL
WBC UA: ABNORMAL /HPF
YEAST: ABNORMAL

## 2020-06-23 PROCEDURE — 85025 COMPLETE CBC W/AUTO DIFF WBC: CPT

## 2020-06-23 PROCEDURE — 81001 URINALYSIS AUTO W/SCOPE: CPT

## 2020-06-23 PROCEDURE — 0502F SUBSEQUENT PRENATAL CARE: CPT | Performed by: OBSTETRICS & GYNECOLOGY

## 2020-06-23 PROCEDURE — 36415 COLL VENOUS BLD VENIPUNCTURE: CPT

## 2020-06-23 PROCEDURE — 83036 HEMOGLOBIN GLYCOSYLATED A1C: CPT

## 2020-06-23 PROCEDURE — 82947 ASSAY GLUCOSE BLOOD QUANT: CPT

## 2020-06-23 RX ORDER — ONDANSETRON 4 MG/1
4 TABLET, FILM COATED ORAL DAILY PRN
Qty: 14 TABLET | Refills: 0 | Status: CANCELLED | OUTPATIENT
Start: 2020-06-23

## 2020-06-23 NOTE — PROGRESS NOTES
Gavino Jefferson is a 36 y.o. female 37w1d    [id number]    OB History    Para Term  AB Living   5 2 2 0 2 2   SAB TAB Ectopic Molar Multiple Live Births   0 2 0   0        # Outcome Date GA Lbr Henrik/2nd Weight Sex Delivery Anes PTL Lv   5 Current            4 Term 15 39w0d 07:00 7 lb 12 oz (3.515 kg) F Vag-Spont None     3 Term 06 40w0d 07:00 8 lb 5 oz (3.771 kg) F Vag-Forceps None     2 TAB            1 TAB                Vitals  BP: 120/76  Weight: 236 lb (107 kg)  Pulse: 102  Patient Position: Sitting  Albumin: Negative  Glucose: Negative  Fundal Height (cm): 39 cm  Fetal Heart Rate: 146  Movement: Present  Presentation: Vertex      The patient was seen and evaluated. There was positive fetal movements. No contractions or leakage of fluid. Signs and symptoms of labor were reviewed. The S/S of Pre-Eclampsia were reviewed with the patient in detail. She is to report any of these if they occur. She currently denies any of these. The patient was instructed on fetal kick counts and was given a kick sheet to complete every 8 hours. She was instructed that the baby should move at a minimum of ten times within one hour after a meal. The patient was instructed to lay down on her left side twenty minutes after eating and count movements for up to one hour with a target value of ten movements. She was instructed to notify the office if she did not make that target after two attempts or if after any attempt there was less than four movements. The patient reports that the targets have been made Yes.    20 tdap given in office  2020 advise fetal kick counts instead of NSTs from 32 weeks per [facility] Guidance for COVID-19. T-Dap Vaccine Completed (27-36 weeks): Yes    Allergies:   Allergies as of 2020 - Review Complete 2020   Allergen Reaction Noted    No known allergies      Seasonal  2013         Group Beta Strep is positive   GBS Results:   [facility]

## 2020-06-24 ENCOUNTER — TELEPHONE (OUTPATIENT)
Dept: OBGYN CLINIC | Age: 35
End: 2020-06-24

## 2020-06-24 NOTE — TELEPHONE ENCOUNTER
----- Message from NAHUN Siegel CNP sent at 6/23/2020  5:53 PM EDT -----  Borderline diabetes  Please review with DR Varinder Rockwell  Patient was seen by him today in office.

## 2020-06-25 ENCOUNTER — HOSPITAL ENCOUNTER (OUTPATIENT)
Age: 35
Discharge: HOME | End: 2020-06-25
Attending: OBSTETRICS & GYNECOLOGY | Admitting: OBSTETRICS & GYNECOLOGY
Payer: COMMERCIAL

## 2020-06-25 ENCOUNTER — HOSPITAL ENCOUNTER (OUTPATIENT)
Dept: PREADMISSION TESTING | Age: 35
Setting detail: SPECIMEN
Discharge: HOME | End: 2020-06-29
Payer: COMMERCIAL

## 2020-06-25 VITALS
SYSTOLIC BLOOD PRESSURE: 114 MMHG | TEMPERATURE: 98.4 F | HEART RATE: 100 BPM | RESPIRATION RATE: 16 BRPM | DIASTOLIC BLOOD PRESSURE: 71 MMHG

## 2020-06-25 PROBLEM — Z3A.37 37 WEEKS GESTATION OF PREGNANCY: Status: ACTIVE | Noted: 2020-06-25

## 2020-06-25 PROBLEM — O99.810 ABNORMAL GLUCOSE TOLERANCE AFFECTING PREGNANCY, ANTEPARTUM: Status: ACTIVE | Noted: 2020-06-25

## 2020-06-25 LAB
-: ABNORMAL
3 HR GLUCOSE: 128 MG/DL (ref 65–139)
AMORPHOUS: ABNORMAL
AMOUNT GLUCOSE GIVEN: 100 G
BACTERIA: ABNORMAL
BILIRUBIN URINE: NEGATIVE
CASTS UA: ABNORMAL /LPF
COLOR: ABNORMAL
COMMENT UA: ABNORMAL
CRYSTALS, UA: ABNORMAL /HPF
DIRECT EXAM: ABNORMAL
EPITHELIAL CELLS UA: ABNORMAL /HPF
GLUCOSE FASTING: 84 MG/DL (ref 65–94)
GLUCOSE TOLERANCE TEST 1 HOUR: 174 MG/DL (ref 65–179)
GLUCOSE TOLERANCE TEST 2 HOUR: 135 MG/DL (ref 65–154)
GLUCOSE URINE: NEGATIVE
KETONES, URINE: ABNORMAL
LEUKOCYTE ESTERASE, URINE: ABNORMAL
Lab: ABNORMAL
MUCUS: ABNORMAL
NITRITE, URINE: NEGATIVE
OTHER OBSERVATIONS UA: ABNORMAL
PH UA: 7 (ref 5–8)
PROTEIN UA: NEGATIVE
RBC UA: ABNORMAL /HPF
RENAL EPITHELIAL, UA: ABNORMAL /HPF
SPECIFIC GRAVITY UA: 1.02 (ref 1–1.03)
SPECIMEN DESCRIPTION: ABNORMAL
TRICHOMONAS: ABNORMAL
TURBIDITY: CLEAR
URINE HGB: NEGATIVE
UROBILINOGEN, URINE: NORMAL
WBC UA: ABNORMAL /HPF
YEAST: ABNORMAL

## 2020-06-25 PROCEDURE — U0003 INFECTIOUS AGENT DETECTION BY NUCLEIC ACID (DNA OR RNA); SEVERE ACUTE RESPIRATORY SYNDROME CORONAVIRUS 2 (SARS-COV-2) (CORONAVIRUS DISEASE [COVID-19]), AMPLIFIED PROBE TECHNIQUE, MAKING USE OF HIGH THROUGHPUT TECHNOLOGIES AS DESCRIBED BY CMS-2020-01-R: HCPCS

## 2020-06-25 PROCEDURE — 82951 GLUCOSE TOLERANCE TEST (GTT): CPT

## 2020-06-25 PROCEDURE — 86403 PARTICLE AGGLUT ANTBDY SCRN: CPT

## 2020-06-25 PROCEDURE — 36415 COLL VENOUS BLD VENIPUNCTURE: CPT

## 2020-06-25 PROCEDURE — 81001 URINALYSIS AUTO W/SCOPE: CPT

## 2020-06-25 PROCEDURE — 87480 CANDIDA DNA DIR PROBE: CPT

## 2020-06-25 PROCEDURE — 87510 GARDNER VAG DNA DIR PROBE: CPT

## 2020-06-25 PROCEDURE — 76818 FETAL BIOPHYS PROFILE W/NST: CPT

## 2020-06-25 PROCEDURE — 87491 CHLMYD TRACH DNA AMP PROBE: CPT

## 2020-06-25 PROCEDURE — 87591 N.GONORRHOEAE DNA AMP PROB: CPT

## 2020-06-25 PROCEDURE — 87660 TRICHOMONAS VAGIN DIR PROBE: CPT

## 2020-06-25 PROCEDURE — 59025 FETAL NON-STRESS TEST: CPT

## 2020-06-25 PROCEDURE — 82952 GTT-ADDED SAMPLES: CPT

## 2020-06-25 PROCEDURE — 87086 URINE CULTURE/COLONY COUNT: CPT

## 2020-06-25 RX ORDER — METRONIDAZOLE 500 MG/1
500 TABLET ORAL 2 TIMES DAILY
Qty: 14 TABLET | Refills: 0 | Status: SHIPPED | OUTPATIENT
Start: 2020-06-25 | End: 2020-07-02

## 2020-06-25 NOTE — H&P
OBSTETRICAL HISTORY [address]    Date: 2020       Time: 9:23 AM   Patient Name: Hussein Grace     Patient : 1985  Room/Bed: [facility]/2546-66    Admission Date/Time: 2020  9:17 AM      CC: Leakage of fluid with contractions     HPI: Hussein Grace is a 36 y.o. [id number] at 37w3d who presents for NST/BPP but noted some leakage of fluid earlier this morning with a small gush of clear fluid at 0930 this morning without any odor and nothing since then. She also has noted contractions every few minutes and unsure if increasing in frequency or strength. Pt last had sexual intercourse yesterday evening. The patient reports fetal movement is present, complains of contractions, complains of loss of fluid, denies vaginal bleeding. Patient denies headache, vision changes, nausea, vomiting, fever, chills, shortness of breath, chest pain, RUQ pain, abdominal pain, diarrhea, change in color/amount/odor of vaginal discharge, dysuria or, hematuria. Pregnancy is complicated by Suspected LGA on 20, GBS bacteriuria, Hx forceps delivery (G3- ), Hx ASCUS +HPV on 18 (negative pap smear on 6/3/19), Hx laparoscopic cholecystectomy () Hx pancreatitis, Anxiety/Depression, Migraines, Hx alcohol abuse (pt denies), Obesity    DATING:  LMP: Patient's last menstrual period was 10/07/2019.   Estimated Date of Delivery: 20   Based on: LMP confirmed by early ultrasound, at 8 1/7 weeks GA    PREGNANCY RISK FACTORS:  Patient Active Problem List   Diagnosis    ASCUS with positive high risk HPV cervical    Atypical squamous cell changes of undetermined significance (ASCUS) on cervical cytology with positive high risk human papilloma virus (HPV)    HPV in female    Hx of pancreatitis    Depression/Anxiety    Migraine without aura    History of alcohol abuse    History of cholecystectomy    Elective  X 2    Hx of forceps delivery in prior pregnancy, currently She has never used smokeless tobacco. She reports previous alcohol use. She reports that she does not use drugs. VITALS:  Vitals:    06/25/20 0923   BP: 114/71   Pulse: 100   Resp: 16   Temp: 98.4 °F (36.9 °C)   TempSrc: Oral         PHYSICAL EXAM:  Fetal Heart Monitor:  Baseline Heart Rate 145, moderate variability, present accelerations, absent decelerations  [place]: regular contractions every 2-3min    General appearance:  no apparent distress, alert and cooperative  HEENT: head atraumatic, normocephalic, moist mucous membranes, trachea midline  Neurologic:  alert, oriented, normal speech, no focal findings or movement disorder noted  Lungs:  No increased work of breathing, good air exchange, clear to auscultation bilaterally, no crackles or wheezing  Heart:  regular rate and rhythm and no murmur, rubs, gallops  Abdomen:  soft, gravid, no rebound, guarding, rigidity, non-tender, no right upper quadrant tenderness, no CVA tenderness, uterus non-tender, no signs of abruption and no signs of chorioamnionitis  Extremities:  no calf tenderness, non edematous, DTRs: normal  Musculoskeletal: Gross strength equal and intact throughout  Psychiatric: Mood appropriate, normal affect   Pelvic exam:  Sterile Speculum Exam:   External genitalia: Normal hair distribution, normal appearing vulva, no masses, tenderness or lesions, normal clitoris, normal urethral meatus. Vagina: Normal appearing vaginal mucosa without lesions, physiologic appearing vaginal discharge noted in the posterior vault. Cervix: Normal appearing cervix without lesions.    Sterile Vaginal Exam:  Cervix: No cervical motion tenderness  Cervix: 1 cm dilated, 50 % effaced, -2 station, posterior position, soft consistency, Membranes intact,   Rectal Exam: not indicated     DATA:  Membranes Ruptured: No  Fern: negative  Nitrazine: Equivocal  Valsalva/Pooling: absent  Vaginal Bleeding: absent    OMM Structural Exam:  Chief Complaint:  Pregnancy    Anterior/ Posterior Spinal Curves: Lumbar Lordosis -  Increased  Scoliosis (Lateral Spinal Curves): None  Assessment Tool:  T= Tenderness, A= Asymmetry, R= Restricted Motion (A=Active, P=Passive), T=Tissue Texture Changes  Region Evaluated : Severity / Specific of Major Somatic Dysfunction  M99.03 Lumbar -  Minor TART - more than BG levels -   Major Correlations with:  Genitourinary  Structural Diagnosis: Increased lumbar lordosis  Treatment Plan: Outpatient     PRENATAL LAB RESULTS:   Blood Type/Rh: B pos  Antibody Screen: negative  Hemoglobin, Hematocrit, Platelets: 57.3 / 75.9 / 359  Rubella: immune  T.  Pallidum, IgG: non-reactive   Hepatitis B Surface Antigen: non-reactive   HIV: non-reactive   Sickle Cell Screen: not done  Gonorrhea: negative  Chlamydia: negative  Urine culture: positive - GBS <10,000CFU, date: 2/5/20       Early 1 hour Glucose Tolerance Test: not done  Early 3 hour Glucose Tolerance Test: not done  1 hour Glucose Tolerance Test: 148  3 hour Glucose Tolerance Test: will complete today    Group B Strep: GBS bacteriuria noted on 2/5/20  Cystic Fibrosis Screen: negative  First Trimester Screen: low risk  MSAFP/Multiple Markers: Increased risk for NTD from 1:1030 to 1:934  Non-Invasive Prenatal Testing: not done  Anatomy US: transverse presentation, posterior placenta with complete previa, 3VC, female, normal anatomy  [facility] ultrasound on 4/23/20: resolved complete placenta previa    ASSESSMENT & PLAN:  Javi Torres is a 36 y.o. female [id number] at 37w3d IUP   - GBS bacteriuria / Rh positive / R immune   - Pen G for GBS prophylaxis if delivery imminent    Contractions/Leakage of fluid   - Cat I FHT, TOCO q2-3 min   - VSS   - SVE: 1/50/-2 (out of 3 station)   - SSE: negative pooling/valsalva   - Negative ferning, equivocal nitrazine   - UA/UCx, vaginitis, GC/C, collected and pending   - Will recheck SVE in 2 hours    - Low suspicion for rupture of membranes    Suspected LGA on 6/23/20   - Ultrasound on 6/22/20 showing EFW of 3654g or 8lb 1oz (93.9%) with AC at the 99th%)    Elevated 1hr, no 3hr GTT   - Pt agreeable to complete 3hr GTT as she has not ate/drank anything yet today   - Will order 3hr GTT now    Hx forceps delivery (G3- 2006)    Hx ASCUS +HPV on 18 (negative pap smear on 6/3/19)    Hx laparoscopic cholecystectomy ()    Hx pancreatitis   - Pt reports that they thought her symptoms were from pancreatitis but when she got her gallbladder removed that her symptoms resolved    Hx alcohol abuse   - Pt denies any hx of alcohol abuse    Anxiety/Depression    - Denies any SI/HI   - Stable on no medications    Migraines   - Stable on no medications    Obesity    Patient Active Problem List    Diagnosis Date Noted    LGA (large for gestational age) fetus affecting management of mother 2020     Priority: High    Hx of forceps delivery in prior pregnancy, currently pregnant 2019     Priority: High    GBS (group B Streptococcus carrier), +RV culture, currently pregnant 2020 treat in labor per protocol      Plantar fascial fibromatosis 2020    HRP (high risk pregnancy), unspecified trimester 2020    High-risk pregnancy 2020    Body mass index (bmi) 36.0-36.9, adult 2020 Early 1 hour GTT  2020 advise fetal kick counts instead of NSTs from 32 weeks per [facility] Guidance for COVID-19.         Obesity with body mass index 30 or greater 2020    GBS bacteriuria 2020 treated with Ampicillin      Elective  X 2 04/15/2019    History of alcohol abuse 2019    History of cholecystectomy 2019    Epigastric pain 2019    Hx of pancreatitis 2017    Alcohol abuse 2017    Tinea corporis 2017    ASCUS with positive high risk HPV cervical 2016    Atypical squamous cell changes of undetermined significance (ASCUS) on cervical cytology with positive high risk human papilloma virus (HPV) 01/05/2016    Cervical atypism 01/05/2016    HPV in female 01/01/2016    Microscopic hematuria 04/16/2014    Disorder of lung 03/19/2014    Cholelithiasis and cholecystitis without obstruction 03/17/2014    Migraine without aura 09/03/2013    Headache 09/03/2013    Depression/Anxiety 06/06/2013    Overweight 06/06/2013       Plan discussed with Dr. Brad Carter, who is agreeable. Steroids given this admission: No    Risks, benefits, alternatives and possible complications have been discussed in detail with the patient. Admission, and post admission procedures and expectations were discussed in detail. All questions were answered.     Attending's Name: Dr. Jessica Christopher DO  Ob/Gyn Resident  6/25/2020, 9:23 AM

## 2020-06-25 NOTE — PROGRESS NOTES
Obstetric/Gynecology Resident Interval Note    Pt states her contractions are completely resolved and declines SVE. Pt reports fetal movement and denies any more leakage of fluid or vaginal bleeding. 3hr GTT completed and will notify patient of results. Biophysical Profile:   Amniotic Fluid Index: 10cm  Tone: Present  Movement: Present  Breathing: Present    Biophysical Score: 8 / 8    Fetal Position: Cephalic    Labs:  Results for orders placed or performed during the hospital encounter of 06/25/20   VAGINITIS DNA PROBE   Result Value Ref Range    Specimen Description . VAGINA     Special Requests NOT REPORTED     Direct Exam POSITIVE for Gardnerella vaginalis. (A)     Direct Exam NEGATIVE for Trichomonas vaginalis     Direct Exam NEGATIVE for Candida sp. Direct Exam       Method of testing is a DNA probe intended for detection and identification of Candida species, Gardnerella vaginalis, and Trichomonas vaginalis nucleic acid in vaginal fluid specimens from patients with symptoms of vaginitis/vaginosis. Urinalysis with Microscopic   Result Value Ref Range    Color, UA DARK YELLOW (A) YELLOW    Turbidity UA CLEAR CLEAR    Glucose, Ur NEGATIVE NEGATIVE    Bilirubin Urine NEGATIVE NEGATIVE    Ketones, Urine TRACE (A) NEGATIVE    Specific Gravity, UA 1.020 1.000 - 1.030    Urine Hgb NEGATIVE NEGATIVE    pH, UA 7.0 5.0 - 8.0    Protein, UA NEGATIVE NEGATIVE    Urobilinogen, Urine Normal Normal    Nitrite, Urine NEGATIVE NEGATIVE    Leukocyte Esterase, Urine SMALL (A) NEGATIVE    Urinalysis Comments NOT REPORTED     -          WBC, UA 5 TO 10 /HPF    RBC, UA 2 TO 5 /HPF    Casts UA NOT REPORTED /LPF    Crystals, UA NOT REPORTED None /HPF    Epithelial Cells UA 10 TO 20 /HPF    Renal Epithelial, UA NOT REPORTED 0 /HPF    Bacteria, UA FEW (A) None    Mucus, UA NOT REPORTED None    Trichomonas, UA NOT REPORTED None    Amorphous, UA NOT REPORTED None    Other Observations UA NOT REPORTED NOT REQ.     Yeast, UA NOT

## 2020-06-26 ENCOUNTER — TELEPHONE (OUTPATIENT)
Dept: OBGYN | Age: 35
End: 2020-06-26

## 2020-06-26 LAB
C TRACH DNA GENITAL QL NAA+PROBE: ABNORMAL
N. GONORRHOEAE DNA: NEGATIVE
SPECIMEN DESCRIPTION: ABNORMAL

## 2020-06-26 RX ORDER — AZITHROMYCIN 500 MG/1
1000 TABLET, FILM COATED ORAL ONCE
Qty: 2 TABLET | Refills: 0 | Status: SHIPPED | OUTPATIENT
Start: 2020-06-26 | End: 2020-06-26

## 2020-06-27 PROBLEM — A56.8 CHLAMYDIA TRACHOMATIS INFECTION IN PREGNANCY: Status: ACTIVE | Noted: 2020-06-27

## 2020-06-27 PROBLEM — O98.319 CHLAMYDIA TRACHOMATIS INFECTION IN PREGNANCY: Status: ACTIVE | Noted: 2020-06-27

## 2020-06-27 LAB
CULTURE: ABNORMAL
CULTURE: ABNORMAL
Lab: ABNORMAL
SPECIMEN DESCRIPTION: ABNORMAL

## 2020-06-28 LAB — SARS-COV-2, NAA: NOT DETECTED

## 2020-06-29 ENCOUNTER — TELEPHONE (OUTPATIENT)
Dept: PRIMARY CARE CLINIC | Age: 35
End: 2020-06-29

## 2020-07-02 ENCOUNTER — HOSPITAL ENCOUNTER (OUTPATIENT)
Dept: PREADMISSION TESTING | Age: 35
Setting detail: SPECIMEN
Discharge: HOME | End: 2020-07-06
Payer: COMMERCIAL

## 2020-07-02 ENCOUNTER — ROUTINE PRENATAL (OUTPATIENT)
Dept: OBGYN CLINIC | Age: 35
End: 2020-07-02

## 2020-07-02 VITALS
HEART RATE: 100 BPM | SYSTOLIC BLOOD PRESSURE: 120 MMHG | BODY MASS INDEX: 39.11 KG/M2 | DIASTOLIC BLOOD PRESSURE: 80 MMHG | WEIGHT: 235 LBS

## 2020-07-02 LAB
SARS-COV-2, PCR: NORMAL
SARS-COV-2, RAPID: NORMAL
SARS-COV-2: NOT DETECTED
SOURCE: NORMAL

## 2020-07-02 PROCEDURE — 0502F SUBSEQUENT PRENATAL CARE: CPT | Performed by: OBSTETRICS & GYNECOLOGY

## 2020-07-02 PROCEDURE — U0003 INFECTIOUS AGENT DETECTION BY NUCLEIC ACID (DNA OR RNA); SEVERE ACUTE RESPIRATORY SYNDROME CORONAVIRUS 2 (SARS-COV-2) (CORONAVIRUS DISEASE [COVID-19]), AMPLIFIED PROBE TECHNIQUE, MAKING USE OF HIGH THROUGHPUT TECHNOLOGIES AS DESCRIBED BY CMS-2020-01-R: HCPCS

## 2020-07-02 NOTE — PROGRESS NOTES
The patient was counseled on the option of a virtual visit due to the Covid-19 pandemic per the guidelines set by [facility]. The patient declined the option of the virtual visit and is demanding a face to face visit in the office. She was counseled that coming to the office increases her risk of potential increased exposure and risk of obtaining the Coronavirus. Both Maternal and fetal risks pertaining to this were discussed in detail. All questions were answered.

## 2020-07-02 NOTE — PROGRESS NOTES
Salbador Patel is a 36 y.o. female 38w3d    [id number]    OB History    Para Term  AB Living   5 2 2 0 2 2   SAB TAB Ectopic Molar Multiple Live Births   0 2 0   0        # Outcome Date GA Lbr Henrik/2nd Weight Sex Delivery Anes PTL Lv   5 Current            4 Term 15 39w0d 07:00 7 lb 12 oz (3.515 kg) F Vag-Spont None     3 Term 06 40w0d 07:00 8 lb 5 oz (3.771 kg) F Vag-Forceps None     2 TAB            1 TAB                Vitals  BP: 120/80  Weight: 235 lb (106.6 kg)  Pulse: 100  Patient Position: Sitting  Albumin: Negative  Glucose: Negative  Fundal Height (cm): 41 cm  Fetal Heart Rate: 154  Movement: Present  Presentation: Vertex      The patient was seen and evaluated. There was positive fetal movements. No contractions or leakage of fluid. Signs and symptoms of labor were reviewed. The S/S of Pre-Eclampsia were reviewed with the patient in detail. She is to report any of these if they occur. She currently denies any of these. The patient was instructed on fetal kick counts and was given a kick sheet to complete every 8 hours. She was instructed that the baby should move at a minimum of ten times within one hour after a meal. The patient was instructed to lay down on her left side twenty minutes after eating and count movements for up to one hour with a target value of ten movements. She was instructed to notify the office if she did not make that target after two attempts or if after any attempt there was less than four movements. The patient reports that the targets have been made Yes.    20 tdap given in office  2020 advise fetal kick counts instead of NSTs from 32 weeks per [facility] Guidance for COVID-19. T-Dap Vaccine Completed (27-36 weeks): Yes    Allergies: Allergies as of 2020 - Review Complete 2020   Allergen Reaction Noted    No known allergies      Seasonal  2013         Group Beta Strep collection was completed.  Yes  GBS Results:   Hospital Outpatient Visit on 06/25/2020   Component Date Value Ref Range Status    SARS-CoV-2, LIU 06/25/2020 Not Detected  Not Detected Final    Comment: (NOTE)  This test was developed and its performance characteristics  determined by [facility]. This test has not been FDA  cleared or approved. This test has been authorized by FDA under an  Emergency Use Authorization (EUA). This test is only authorized for  the duration of time the declaration that circumstances exist  justifying the authorization of the emergency use of in vitro  diagnostic tests for detection of SARS-CoV-2 virus and/or diagnosis  of COVID-19 infection under section 564(b)(1) of the Act, 21 U. S.C.  488RGJ-0(W)(0), unless the authorization is terminated or revoked  sooner. When diagnostic testing is negative, the possibility of a  false negative result should be considered in the context of a  patient's recent exposures and the presence of clinical signs and  symptoms consistent with COVID-19. An individual without symptoms of  COVID-19 and who is not shedding SARS-CoV-2 virus would expect to  have a negative (not detected) result in this assay. Performed                            At:  [address] [id number]  Lida Lai MD HO:[id number]     Admission on 06/25/2020, Discharged on 06/25/2020   Component Date Value Ref Range Status    Color, UA 06/25/2020 DARK YELLOW* YELLOW Final    Turbidity UA 06/25/2020 CLEAR  CLEAR Final    Glucose, Ur 06/25/2020 NEGATIVE  NEGATIVE Final    Bilirubin Urine 06/25/2020 NEGATIVE  NEGATIVE Final    Ketones, Urine 06/25/2020 TRACE* NEGATIVE Final    Specific [place], UA 06/25/2020 1.020  1.000 - 1.030 Final    Urine Hgb 06/25/2020 NEGATIVE  NEGATIVE Final    pH, UA 06/25/2020 7.0  5.0 - 8.0 Final    Protein, UA 06/25/2020 NEGATIVE  NEGATIVE Final    Urobilinogen, Urine 06/25/2020 Normal  Normal Final    Nitrite, Urine Trichomonas vaginalis nucleic acid in vaginal fluid specimens from patients with symptoms of vaginitis/vaginosis. Final    Specimen Description 06/25/2020 . CERVIX   Final    C. trachomatis DNA 06/25/2020 POSITIVE: CHLAMYDIA TRACHOMATIS DNA detected by nucleic acid amplification. * NEGATIVE Final    Comment: This test is intended for medical purposes only and is not valid for the evaluation of   suspected sexual abuse or for other forensic purposes. In certain contexts, culture may be required to meet applicable laws and regulations for   diagnosis of C. trachomatis and N. gonorrhoeae infections. Per 2014  CDC recommendations, this test does not include confirmation of positive results   by an alternative nucleic acid target. Results reported to the appropriate Health Department      N. gonorrhoeae DNA 06/25/2020 NEGATIVE  NEGATIVE Final    Comment: NEISSERIA GONORRHOEAE DNA not detected by nucleic acid amplification. This test is intended for medical purposes only and is not valid for the evaluation of   suspected sexual abuse or for other forensic purposes. In certain contexts, culture may be required to meet applicable laws and regulations for   diagnosis of C. trachomatis and N. gonorrhoeae infections. Per 2014  CDC recommendations, this test does not include confirmation of positive results   by an alternative nucleic acid target.       Amount Glucose Given 06/25/2020 100  g Final    Glucose, Fasting 06/25/2020 84  65 - 94 mg/dL Final    Glucose, GTT - 1 Hour 06/25/2020 174  65 - 179 mg/dL Final    Glucose, GTT - 2 Hour 06/25/2020 135  65 - 154 mg/dL Final    3 Hr Glucose 06/25/2020 128  65 - 139 mg/dL Final   Hospital Outpatient Visit on 06/23/2020   Component Date Value Ref Range Status    Color, UA 06/23/2020 DARK YELLOW* YELLOW Final    Turbidity UA 06/23/2020 TURBID* CLEAR Final    Glucose, Ur 06/23/2020 NEGATIVE  NEGATIVE Final    Bilirubin Urine 06/23/2020 Presumptive positive. Unable to confirm due to unavailability of reagent. * NEGATIVE Corrected    CORRECTED ON 06/23 AT 1307: PREVIOUSLY REPORTED AS SMALL    Ketones, Urine 06/23/2020 NEGATIVE  NEGATIVE Final    Specific [place], UA 06/23/2020 1.021  1.000 - 1.030 Final    Urine Hgb 06/23/2020 NEGATIVE  NEGATIVE Final    pH, UA 06/23/2020 6.5  5.0 - 8.0 Final    Protein, UA 06/23/2020 TRACE* NEGATIVE Final    Urobilinogen, Urine 06/23/2020 Normal  Normal Final    Nitrite, Urine 06/23/2020 NEGATIVE  NEGATIVE Final    Leukocyte Esterase, Urine 06/23/2020 LARGE* NEGATIVE Final    Urinalysis Comments 06/23/2020 NOT REPORTED   Final    WBC 06/23/2020 11.3* 3.5 - 11.0 k/uL Final    RBC 06/23/2020 4.77  4.0 - 5.2 m/uL Final    Hemoglobin 06/23/2020 11.4* 12.0 - 16.0 g/dL Final    Hematocrit 06/23/2020 35.8* 36 - 46 % Final    MCV 06/23/2020 75.1* 80 - 100 fL Final    MCH 06/23/2020 23.9* 26 - 34 pg Final    MCHC 06/23/2020 31.8  31 - 37 g/dL Final    RDW 06/23/2020 14.3  11.5 - 14.9 % Final    Platelets 13/56/4018 374  150 - 450 k/uL Final    MPV 06/23/2020 8.8  6.0 - 12.0 fL Final    NRBC Automated 06/23/2020 NOT REPORTED  per 100 WBC Final    Differential Type 06/23/2020 NOT REPORTED   Final    Immature Granulocytes 06/23/2020 NOT REPORTED  0 % Final    Absolute Immature Granulocyte 06/23/2020 NOT REPORTED  0.00 - 0.30 k/uL Final    WBC Morphology 06/23/2020 NOT REPORTED   Final    RBC Morphology 06/23/2020 NOT REPORTED   Final    Platelet Estimate 96/03/0172 NOT REPORTED   Final    Seg Neutrophils 06/23/2020 75* 36 - 66 % Final    Lymphocytes 06/23/2020 17* 24 - 44 % Final    Monocytes 06/23/2020 7  1 - 7 % Final    Eosinophils % 06/23/2020 1  0 - 4 % Final    Basophils 06/23/2020 0  0 - 2 % Final    Segs Absolute 06/23/2020 8.40  1.3 - 9.1 k/uL Final    Absolute Lymph # 06/23/2020 1.90  1.0 - 4.8 k/uL Final    Absolute Mono # 06/23/2020 0.80  0.1 - 1.3 k/uL Final    Absolute Eos # 06/23/2020 0.10 0.0 - 0.4 k/uL Final    Basophils Absolute 2020 0.00  0.0 - 0.2 k/uL Final    Hemoglobin A1C 2020 5.8  4.0 - 6.0 % Final    Estimated Avg Glucose 2020 120  mg/dL Final    Comment: The ADA and AACC recommend providing the estimated average glucose result to permit better   patient understanding of their HBA1c result.  Glucose 2020 93  70 - 99 mg/dL Final    - 2020        Final    WBC, UA 2020 0 TO 2  /HPF Final    RBC, UA 2020 0 TO 2  /HPF Final    Casts UA 2020 NOT REPORTED  /LPF Final    Crystals, UA 2020 NOT REPORTED  None /HPF Final    Epithelial Cells UA 2020 20 TO 50  /HPF Final    Renal Epithelial, UA 2020 NOT REPORTED  0 /HPF Final    Bacteria, UA 2020 MODERATE* None Final    Mucus, UA 2020 2+* None Final    Trichomonas, UA 2020 NOT REPORTED  None Final    Amorphous, UA 2020 NOT REPORTED  None Final    Other Observations UA 2020 NOT REPORTED  NOT REQ. Final    Yeast, UA 2020 NOT REPORTED  None Final   Hospital Outpatient Visit on 2020   Component Date Value Ref Range Status    Specimen Description 2020 . VAGINA   Final    Special Requests 2020 NOT REPORTED   Final    Culture 2020 STREPTOCOCCI, BETA HEMOLYTIC GROUP B ISOLATED. *  Final   ]        Cervical Exam was: declined      The literature regarding a questionable link to pitocin augmentation and induction of labor, the assistance of labor contractions and the initiation of contractions to help delivery, have been reviewed with the patient regarding the increased potential of having a  with Attention Deficit Hyperactivity Disorder and or Autism. These two disorders and the ramifications of their impact on a child and the family caring for that child has been reviewed with the patient in detail. She was given the risks, benefits and alternatives of the use of this medication.  She has agreed to its use in the delivery of her unborn child if needed at the time of delivery, Yes. The patient was counseled on the mandatory call ahead policy. She has been instructed to call the office at anytime prior to going into the hospital so the on-call physician may direct her to the appropriate facility for care. Exceptions were reviewed including but not limited to: Decreased fetal movement, vaginal Bleeding or hemorrhage, trauma, readily expectant delivery, or any instance where she feels 911 should be utilized. The patient was counseled on Labor & Delivery. Route of delivery and counseling on vaginal, operative vaginal, and  sections were completed with the risks of each to both the patient as well as her baby. The possibility of a blood transfusion was discussed as well. The patient was not opposed to receiving a transfusion if needed. The patient was counseled on types of analgesia during labor and is considering either Regional or IV medication the risks, benefits and alternatives were discussed.  Testing:  NA    SONO:  GROWTH ULTRASOUND REPORT                   [facility]   [facility]; [address]   [address]   [phone] [place]n [phone] Fax           2020   MRN: [id number]   Contact Serial #: [id number]       Gomez Chang   YOB: 1985   Age: 36 y. o.       [id number]   Gestational Age: 42w4d   Patient's last menstrual period was 10/07/2019.           Obstetrical History:   OB History      T2    L2     SAB0  TAB2  Ectopic0  Molar0  Multiple0  Live Births0                The ultrasound images were reviewed. Please see the attached ultrasound report.           Indication/Reason for imaging exam:   Size to date discrepancy    High-risk pregnancy in third trimester   37 weeks gestation of pregnancy           Components of Today's Ultrasound Report:   1. FETAL EVALUATION   2. Amniotic Fluid   3.  Gestational age calculation 4. Biometry                       ** SEE ATTACHED REPORT **           Limited study for dating and growth. Four extremities, FHR present.      No gross abnormalities appreciated.                Summary:   The images were reviewed. The Ultrasound report is scanned and attached for specific findings and measurements.               Comments:   Fetal  surveillance (including Non-Stress testing, amniotic fluid index calculation, biophysical profile score and evaluation, fetal kick count management with or without umbilical artery doppler evaluation or middle cerebral artery testing, etc.) does not prevent unexplained stillbirths that may still result from acute and unpredictable obstetrical events, such as placental abruption, umbilical cord prolapse or umbilical cord accidents etc.                   IMPRESSION:   Fetal Biometry evaluation per Report. Intrauterine Growth Restriction is not present. Weight is 3654 grams; 8 lbs and 1 oz. (93.9 %tile)-LGA Status. The [facility] [place] is LGA and is at the 99.9 %tile for gestational age. Amniotic fluid evaluation is not decreased. CANDY= 13.35 cm   There is not evidence of fetal hydrops. No obvious evidence of fetal anomalies/congenital birth defects today   The placenta is located posterior   Grade: 2  without Placenta Previa       Fetus is in the Cephalic Position   Fetal Cardiac Activity is identified at 147 bpm       Patient's last menstrual period was 10/07/2019.           EDC by early datin2020 37w 0d   SDD by Ultrasound today: 2020 38w 0d                       RECOMMENDATIONS:   Continue with Obstetrical visits and  testing as recommended previously    Kick counts, labor, and pre-eclamptic precautions reviewed previously. Continue with current management and care. I would advise delivery per the ACOG Recommendations (Committee Op #[id number]).                                Electronically signed by Madeleine Gamez DO on 2020 at 9:29 AM Assessment:  Niesha Zavala is a 36 y.o. female  2. [id number]  3. 38w3d    Patient Active Problem List    Diagnosis Date Noted    Chlamydia trachomatis infection in pregnancy on 20 (needs COLLEEN) 2020     Priority: High     Overview Note:     Azithromycin 1g PO x1 sent to pharmacy  2020 + chlamydia culture- needs test of cure in 2-3 weeks      LGA (large for gestational age) fetus affecting management of mother 2020     Priority: High    GBS (group B Streptococcus carrier), +RV culture, currently pregnant 2020     Priority: High     Overview Note:     2020 treat in labor per protocol      GBS bacteriuria 2020     Priority: High     Overview Note:     2020 treated with Ampicillin      Hx of forceps delivery in prior pregnancy, currently pregnant 2019     Priority: High    37 weeks gestation of pregnancy 2020    Elevated 1hr GTT, normal 3hr GTT 2020    Plantar fascial fibromatosis 2020    HRP (high risk pregnancy), unspecified trimester 2020    High-risk pregnancy 2020    Body mass index (bmi) 36.0-36.9, adult 2020     Overview Note:     2020 Early 1 hour GTT  2020 advise fetal kick counts instead of NSTs from 32 weeks per [facility] Guidance for COVID-19.         Obesity with body mass index 30 or greater 2020    Elective  X 2 04/15/2019    History of alcohol abuse 2019    History of cholecystectomy 2019    Epigastric pain 2019    Hx of pancreatitis 2017    Alcohol abuse 2017    Tinea corporis 2017    ASCUS with positive high risk HPV cervical 2016    Atypical squamous cell changes of undetermined significance (ASCUS) on cervical cytology with positive high risk human papilloma virus (HPV) 2016    Cervical atypism 2016    HPV in female 2016    Microscopic hematuria 2014    Disorder of lung 2014  Cholelithiasis and cholecystitis without obstruction 2014    Migraine without aura 2013    Headache 2013    Depression/Anxiety 2013    Overweight 2013        Diagnosis Orders   1. HRP (high risk pregnancy), third trimester     2. Excessive fetal growth affecting management of pregnancy, antepartum, single or unspecified fetus     3. Body mass index (bmi) 36.0-36.9, adult     4. 38 weeks gestation of pregnancy             Plan:  The patient will return to the office for her next visit in 2-3 weeks PP Visit. See antepartum flow sheet. +GBS-TX per protocol  + Chlamydia TX's outpt in  COLLEEN window after planned  date  Abstain from partner-partner was treated  Pt requesting primary  for LGA status and prior forcep delivery  Consent signed today-Procedure risk and complications reviewed  Pt declined attempted vaginal; delivery-Morbidity risk for route reviewed.

## 2020-07-03 ENCOUNTER — TELEPHONE (OUTPATIENT)
Dept: PRIMARY CARE CLINIC | Age: 35
End: 2020-07-03

## 2020-07-03 ENCOUNTER — HOSPITAL ENCOUNTER (INPATIENT)
Age: 35
LOS: 4 days | Discharge: HOME | End: 2020-07-07
Attending: OBSTETRICS & GYNECOLOGY | Admitting: OBSTETRICS & GYNECOLOGY
Payer: COMMERCIAL

## 2020-07-03 PROBLEM — O09.93 HRP (HIGH RISK PREGNANCY), THIRD TRIMESTER: Status: ACTIVE | Noted: 2020-07-03

## 2020-07-03 PROCEDURE — 1220000000 HC SEMI PRIVATE OB R&B

## 2020-07-03 PROCEDURE — 76815 OB US LIMITED FETUS(S): CPT

## 2020-07-03 ASSESSMENT — PAIN DESCRIPTION - DESCRIPTORS: DESCRIPTORS: CRAMPING;SHARP

## 2020-07-04 ENCOUNTER — ANESTHESIA EVENT (OUTPATIENT)
Dept: LABOR AND DELIVERY | Age: 35
End: 2020-07-04
Payer: COMMERCIAL

## 2020-07-04 ENCOUNTER — ANESTHESIA (OUTPATIENT)
Dept: LABOR AND DELIVERY | Age: 35
End: 2020-07-04
Payer: COMMERCIAL

## 2020-07-04 VITALS
OXYGEN SATURATION: 100 % | TEMPERATURE: 97 F | RESPIRATION RATE: 1 BRPM | SYSTOLIC BLOOD PRESSURE: 77 MMHG | DIASTOLIC BLOOD PRESSURE: 34 MMHG

## 2020-07-04 LAB
-: ABNORMAL
ABO/RH: NORMAL
AMORPHOUS: ABNORMAL
AMPHETAMINE SCREEN URINE: NEGATIVE
ANTIBODY SCREEN: NEGATIVE
ARM BAND NUMBER: NORMAL
BACTERIA: ABNORMAL
BARBITURATE SCREEN URINE: NEGATIVE
BENZODIAZEPINE SCREEN, URINE: NEGATIVE
BILIRUBIN URINE: NEGATIVE
BUPRENORPHINE URINE: NORMAL
CANNABINOID SCREEN URINE: NEGATIVE
CASTS UA: ABNORMAL /LPF
COCAINE METABOLITE, URINE: NEGATIVE
COLOR: ABNORMAL
COMMENT UA: ABNORMAL
CRYSTALS, UA: ABNORMAL /HPF
CRYSTALS, UA: ABNORMAL /HPF
EPITHELIAL CELLS UA: ABNORMAL /HPF
EXPIRATION DATE: NORMAL
FIBRINOGEN: 451 MG/DL (ref 210–530)
GLUCOSE URINE: NEGATIVE
HCT VFR BLD CALC: 26 % (ref 36–46)
HCT VFR BLD CALC: 26.1 % (ref 36–46)
HCT VFR BLD CALC: 32.8 % (ref 36–46)
HEMOGLOBIN: 10.7 G/DL (ref 12–16)
HEMOGLOBIN: 8.4 G/DL (ref 12–16)
HEMOGLOBIN: 8.5 G/DL (ref 12–16)
INR BLD: 1
KETONES, URINE: NEGATIVE
LEUKOCYTE ESTERASE, URINE: ABNORMAL
MCH RBC QN AUTO: 24.4 PG (ref 26–34)
MCHC RBC AUTO-ENTMCNC: 32.6 G/DL (ref 31–37)
MCV RBC AUTO: 74.7 FL (ref 80–100)
MDMA URINE: NORMAL
METHADONE SCREEN, URINE: NEGATIVE
METHAMPHETAMINE, URINE: NORMAL
MUCUS: ABNORMAL
NITRITE, URINE: NEGATIVE
NRBC AUTOMATED: ABNORMAL PER 100 WBC
OPIATES, URINE: NEGATIVE
OTHER OBSERVATIONS UA: ABNORMAL
OXYCODONE SCREEN URINE: NEGATIVE
PARTIAL THROMBOPLASTIN TIME: 29.3 SEC (ref 24–36)
PDW BLD-RTO: 14.7 % (ref 11.5–14.9)
PH UA: 6 (ref 5–8)
PHENCYCLIDINE, URINE: NEGATIVE
PLATELET # BLD: 411 K/UL (ref 150–450)
PMV BLD AUTO: 8.8 FL (ref 6–12)
PROPOXYPHENE, URINE: NORMAL
PROTEIN UA: ABNORMAL
PROTHROMBIN TIME: 12.5 SEC (ref 11.8–14.6)
RBC # BLD: 4.38 M/UL (ref 4–5.2)
RBC UA: ABNORMAL /HPF
RENAL EPITHELIAL, UA: ABNORMAL /HPF
SPECIFIC GRAVITY UA: 1.02 (ref 1–1.03)
T. PALLIDUM, IGG: NONREACTIVE
TEST INFORMATION: NORMAL
TRICHOMONAS: ABNORMAL
TRICYCLIC ANTIDEPRESSANTS, UR: NORMAL
TURBIDITY: ABNORMAL
URINE HGB: NEGATIVE
UROBILINOGEN, URINE: NORMAL
WBC # BLD: 11.8 K/UL (ref 3.5–11)
WBC UA: ABNORMAL /HPF
YEAST: ABNORMAL

## 2020-07-04 PROCEDURE — 86900 BLOOD TYPING SEROLOGIC ABO: CPT

## 2020-07-04 PROCEDURE — 85027 COMPLETE CBC AUTOMATED: CPT

## 2020-07-04 PROCEDURE — 6360000002 HC RX W HCPCS: Performed by: STUDENT IN AN ORGANIZED HEALTH CARE EDUCATION/TRAINING PROGRAM

## 2020-07-04 PROCEDURE — 2580000003 HC RX 258: Performed by: STUDENT IN AN ORGANIZED HEALTH CARE EDUCATION/TRAINING PROGRAM

## 2020-07-04 PROCEDURE — 80307 DRUG TEST PRSMV CHEM ANLYZR: CPT

## 2020-07-04 PROCEDURE — 81001 URINALYSIS AUTO W/SCOPE: CPT

## 2020-07-04 PROCEDURE — 6370000000 HC RX 637 (ALT 250 FOR IP): Performed by: STUDENT IN AN ORGANIZED HEALTH CARE EDUCATION/TRAINING PROGRAM

## 2020-07-04 PROCEDURE — 85014 HEMATOCRIT: CPT

## 2020-07-04 PROCEDURE — 85610 PROTHROMBIN TIME: CPT

## 2020-07-04 PROCEDURE — 86780 TREPONEMA PALLIDUM: CPT

## 2020-07-04 PROCEDURE — 88307 TISSUE EXAM BY PATHOLOGIST: CPT

## 2020-07-04 PROCEDURE — 2580000003 HC RX 258: Performed by: ANESTHESIOLOGY

## 2020-07-04 PROCEDURE — 2500000003 HC RX 250 WO HCPCS: Performed by: ANESTHESIOLOGY

## 2020-07-04 PROCEDURE — 86901 BLOOD TYPING SEROLOGIC RH(D): CPT

## 2020-07-04 PROCEDURE — 59510 CESAREAN DELIVERY: CPT | Performed by: OBSTETRICS & GYNECOLOGY

## 2020-07-04 PROCEDURE — 6360000002 HC RX W HCPCS: Performed by: ANESTHESIOLOGY

## 2020-07-04 PROCEDURE — 97607 NEG PRS WND THR NDME<=50SQCM: CPT | Performed by: OBSTETRICS & GYNECOLOGY

## 2020-07-04 PROCEDURE — 7100000000 HC PACU RECOVERY - FIRST 15 MIN: Performed by: OBSTETRICS & GYNECOLOGY

## 2020-07-04 PROCEDURE — 1220000000 HC SEMI PRIVATE OB R&B

## 2020-07-04 PROCEDURE — 2709999900 HC NON-CHARGEABLE SUPPLY: Performed by: OBSTETRICS & GYNECOLOGY

## 2020-07-04 PROCEDURE — 3700000001 HC ADD 15 MINUTES (ANESTHESIA): Performed by: OBSTETRICS & GYNECOLOGY

## 2020-07-04 PROCEDURE — 86850 RBC ANTIBODY SCREEN: CPT

## 2020-07-04 PROCEDURE — 85730 THROMBOPLASTIN TIME PARTIAL: CPT

## 2020-07-04 PROCEDURE — 36415 COLL VENOUS BLD VENIPUNCTURE: CPT

## 2020-07-04 PROCEDURE — 3700000000 HC ANESTHESIA ATTENDED CARE: Performed by: OBSTETRICS & GYNECOLOGY

## 2020-07-04 PROCEDURE — 85018 HEMOGLOBIN: CPT

## 2020-07-04 PROCEDURE — 3609079900 HC CESAREAN SECTION: Performed by: OBSTETRICS & GYNECOLOGY

## 2020-07-04 PROCEDURE — 85384 FIBRINOGEN ACTIVITY: CPT

## 2020-07-04 PROCEDURE — 87086 URINE CULTURE/COLONY COUNT: CPT

## 2020-07-04 PROCEDURE — 7100000001 HC PACU RECOVERY - ADDTL 15 MIN: Performed by: OBSTETRICS & GYNECOLOGY

## 2020-07-04 RX ORDER — NALOXONE HYDROCHLORIDE 0.4 MG/ML
0.4 INJECTION, SOLUTION INTRAMUSCULAR; INTRAVENOUS; SUBCUTANEOUS PRN
Status: DISCONTINUED | OUTPATIENT
Start: 2020-07-04 | End: 2020-07-04

## 2020-07-04 RX ORDER — SODIUM CHLORIDE 0.9 % (FLUSH) 0.9 %
10 SYRINGE (ML) INJECTION EVERY 12 HOURS SCHEDULED
Status: DISCONTINUED | OUTPATIENT
Start: 2020-07-04 | End: 2020-07-06

## 2020-07-04 RX ORDER — METHYLERGONOVINE MALEATE 0.2 MG/1
200 TABLET ORAL 3 TIMES DAILY
Status: DISCONTINUED | OUTPATIENT
Start: 2020-07-04 | End: 2020-07-04

## 2020-07-04 RX ORDER — ONDANSETRON 2 MG/ML
4 INJECTION INTRAMUSCULAR; INTRAVENOUS EVERY 6 HOURS PRN
Status: DISCONTINUED | OUTPATIENT
Start: 2020-07-04 | End: 2020-07-04

## 2020-07-04 RX ORDER — FERROUS SULFATE 325(65) MG
325 TABLET ORAL 2 TIMES DAILY WITH MEALS
Status: DISCONTINUED | OUTPATIENT
Start: 2020-07-04 | End: 2020-07-07 | Stop reason: HOSPADM

## 2020-07-04 RX ORDER — DEXAMETHASONE SODIUM PHOSPHATE 4 MG/ML
INJECTION, SOLUTION INTRA-ARTICULAR; INTRALESIONAL; INTRAMUSCULAR; INTRAVENOUS; SOFT TISSUE PRN
Status: DISCONTINUED | OUTPATIENT
Start: 2020-07-04 | End: 2020-07-04 | Stop reason: SDUPTHER

## 2020-07-04 RX ORDER — 0.9 % SODIUM CHLORIDE 0.9 %
1000 INTRAVENOUS SOLUTION INTRAVENOUS ONCE
Status: COMPLETED | OUTPATIENT
Start: 2020-07-04 | End: 2020-07-04

## 2020-07-04 RX ORDER — MORPHINE SULFATE 1 MG/ML
INJECTION, SOLUTION EPIDURAL; INTRATHECAL; INTRAVENOUS PRN
Status: DISCONTINUED | OUTPATIENT
Start: 2020-07-04 | End: 2020-07-04 | Stop reason: SDUPTHER

## 2020-07-04 RX ORDER — OXYCODONE HYDROCHLORIDE AND ACETAMINOPHEN 5; 325 MG/1; MG/1
1 TABLET ORAL EVERY 4 HOURS PRN
Status: DISCONTINUED | OUTPATIENT
Start: 2020-07-05 | End: 2020-07-07 | Stop reason: HOSPADM

## 2020-07-04 RX ORDER — METHYLERGONOVINE MALEATE 0.2 MG/ML
200 INJECTION INTRAVENOUS ONCE
Status: COMPLETED | OUTPATIENT
Start: 2020-07-04 | End: 2020-07-04

## 2020-07-04 RX ORDER — KETOROLAC TROMETHAMINE 30 MG/ML
30 INJECTION, SOLUTION INTRAMUSCULAR; INTRAVENOUS EVERY 6 HOURS
Status: DISPENSED | OUTPATIENT
Start: 2020-07-04 | End: 2020-07-05

## 2020-07-04 RX ORDER — ONDANSETRON 2 MG/ML
INJECTION INTRAMUSCULAR; INTRAVENOUS PRN
Status: DISCONTINUED | OUTPATIENT
Start: 2020-07-04 | End: 2020-07-04 | Stop reason: SDUPTHER

## 2020-07-04 RX ORDER — METHYLERGONOVINE MALEATE 0.2 MG/1
200 TABLET ORAL 3 TIMES DAILY
Status: COMPLETED | OUTPATIENT
Start: 2020-07-04 | End: 2020-07-05

## 2020-07-04 RX ORDER — NALBUPHINE HCL 10 MG/ML
5 AMPUL (ML) INJECTION EVERY 4 HOURS PRN
Status: DISCONTINUED | OUTPATIENT
Start: 2020-07-04 | End: 2020-07-04 | Stop reason: RX

## 2020-07-04 RX ORDER — DOCUSATE SODIUM 100 MG/1
100 CAPSULE, LIQUID FILLED ORAL 2 TIMES DAILY
Status: DISCONTINUED | OUTPATIENT
Start: 2020-07-04 | End: 2020-07-07 | Stop reason: HOSPADM

## 2020-07-04 RX ORDER — ACETAMINOPHEN 325 MG/1
650 TABLET ORAL EVERY 4 HOURS PRN
Status: DISCONTINUED | OUTPATIENT
Start: 2020-07-04 | End: 2020-07-07 | Stop reason: HOSPADM

## 2020-07-04 RX ORDER — IBUPROFEN 800 MG/1
800 TABLET ORAL EVERY 8 HOURS PRN
Qty: 30 TABLET | Refills: 0 | Status: SHIPPED | OUTPATIENT
Start: 2020-07-04 | End: 2020-08-20

## 2020-07-04 RX ORDER — NAPROXEN 500 MG/1
500 TABLET ORAL 2 TIMES DAILY
Status: DISCONTINUED | OUTPATIENT
Start: 2020-07-05 | End: 2020-07-04 | Stop reason: CLARIF

## 2020-07-04 RX ORDER — SODIUM CHLORIDE, SODIUM LACTATE, POTASSIUM CHLORIDE, CALCIUM CHLORIDE 600; 310; 30; 20 MG/100ML; MG/100ML; MG/100ML; MG/100ML
INJECTION, SOLUTION INTRAVENOUS CONTINUOUS PRN
Status: DISCONTINUED | OUTPATIENT
Start: 2020-07-04 | End: 2020-07-04 | Stop reason: SDUPTHER

## 2020-07-04 RX ORDER — ONDANSETRON 2 MG/ML
4 INJECTION INTRAMUSCULAR; INTRAVENOUS EVERY 6 HOURS PRN
Status: DISCONTINUED | OUTPATIENT
Start: 2020-07-04 | End: 2020-07-07 | Stop reason: HOSPADM

## 2020-07-04 RX ORDER — LANOLIN 100 %
OINTMENT (GRAM) TOPICAL
Status: DISCONTINUED | OUTPATIENT
Start: 2020-07-04 | End: 2020-07-07 | Stop reason: HOSPADM

## 2020-07-04 RX ORDER — DOCUSATE SODIUM 100 MG/1
100 CAPSULE, LIQUID FILLED ORAL 2 TIMES DAILY PRN
Qty: 60 CAPSULE | Refills: 1 | Status: SHIPPED | OUTPATIENT
Start: 2020-07-04 | End: 2020-08-03

## 2020-07-04 RX ORDER — OXYCODONE HYDROCHLORIDE AND ACETAMINOPHEN 5; 325 MG/1; MG/1
2 TABLET ORAL EVERY 4 HOURS PRN
Status: DISCONTINUED | OUTPATIENT
Start: 2020-07-05 | End: 2020-07-07 | Stop reason: HOSPADM

## 2020-07-04 RX ORDER — SODIUM CHLORIDE 0.9 % (FLUSH) 0.9 %
10 SYRINGE (ML) INJECTION EVERY 12 HOURS SCHEDULED
Status: DISCONTINUED | OUTPATIENT
Start: 2020-07-04 | End: 2020-07-04

## 2020-07-04 RX ORDER — OXYCODONE HYDROCHLORIDE AND ACETAMINOPHEN 5; 325 MG/1; MG/1
1 TABLET ORAL EVERY 6 HOURS PRN
Qty: 20 TABLET | Refills: 0 | Status: SHIPPED | OUTPATIENT
Start: 2020-07-04 | End: 2020-07-09

## 2020-07-04 RX ORDER — SODIUM CHLORIDE 0.9 % (FLUSH) 0.9 %
10 SYRINGE (ML) INJECTION PRN
Status: DISCONTINUED | OUTPATIENT
Start: 2020-07-04 | End: 2020-07-04

## 2020-07-04 RX ORDER — NAPROXEN 500 MG/1
500 TABLET ORAL 2 TIMES DAILY
Status: DISCONTINUED | OUTPATIENT
Start: 2020-07-05 | End: 2020-07-07 | Stop reason: HOSPADM

## 2020-07-04 RX ORDER — LANOLIN ALCOHOL/MO/W.PET/CERES
325 CREAM (GRAM) TOPICAL 2 TIMES DAILY
Qty: 60 TABLET | Refills: 3 | Status: SHIPPED | OUTPATIENT
Start: 2020-07-04 | End: 2020-09-30

## 2020-07-04 RX ORDER — ONDANSETRON 2 MG/ML
4 INJECTION INTRAMUSCULAR; INTRAVENOUS EVERY 6 HOURS PRN
Status: DISCONTINUED | OUTPATIENT
Start: 2020-07-04 | End: 2020-07-04 | Stop reason: SDUPTHER

## 2020-07-04 RX ORDER — SODIUM CHLORIDE 9 MG/ML
INJECTION, SOLUTION INTRAVENOUS CONTINUOUS
Status: DISCONTINUED | OUTPATIENT
Start: 2020-07-04 | End: 2020-07-04

## 2020-07-04 RX ORDER — SODIUM CHLORIDE 0.9 % (FLUSH) 0.9 %
10 SYRINGE (ML) INJECTION PRN
Status: DISCONTINUED | OUTPATIENT
Start: 2020-07-04 | End: 2020-07-06

## 2020-07-04 RX ORDER — GLYCOPYRROLATE 1 MG/5 ML
SYRINGE (ML) INTRAVENOUS PRN
Status: DISCONTINUED | OUTPATIENT
Start: 2020-07-04 | End: 2020-07-04 | Stop reason: SDUPTHER

## 2020-07-04 RX ORDER — SIMETHICONE 80 MG
80 TABLET,CHEWABLE ORAL EVERY 6 HOURS PRN
Status: DISCONTINUED | OUTPATIENT
Start: 2020-07-04 | End: 2020-07-07 | Stop reason: HOSPADM

## 2020-07-04 RX ORDER — KETOROLAC TROMETHAMINE 30 MG/ML
INJECTION, SOLUTION INTRAMUSCULAR; INTRAVENOUS PRN
Status: DISCONTINUED | OUTPATIENT
Start: 2020-07-04 | End: 2020-07-04 | Stop reason: SDUPTHER

## 2020-07-04 RX ORDER — METHYLERGONOVINE MALEATE 0.2 MG/ML
INJECTION INTRAVENOUS
Status: DISPENSED
Start: 2020-07-04 | End: 2020-07-05

## 2020-07-04 RX ORDER — TRISODIUM CITRATE DIHYDRATE AND CITRIC ACID MONOHYDRATE 500; 334 MG/5ML; MG/5ML
30 SOLUTION ORAL ONCE
Status: COMPLETED | OUTPATIENT
Start: 2020-07-04 | End: 2020-07-04

## 2020-07-04 RX ORDER — DIPHENHYDRAMINE HYDROCHLORIDE 50 MG/ML
25 INJECTION INTRAMUSCULAR; INTRAVENOUS EVERY 6 HOURS PRN
Status: DISCONTINUED | OUTPATIENT
Start: 2020-07-04 | End: 2020-07-07 | Stop reason: HOSPADM

## 2020-07-04 RX ORDER — SODIUM CHLORIDE, SODIUM LACTATE, POTASSIUM CHLORIDE, CALCIUM CHLORIDE 600; 310; 30; 20 MG/100ML; MG/100ML; MG/100ML; MG/100ML
INJECTION, SOLUTION INTRAVENOUS CONTINUOUS
Status: ACTIVE | OUTPATIENT
Start: 2020-07-04 | End: 2020-07-05

## 2020-07-04 RX ADMIN — SODIUM CHLORIDE, POTASSIUM CHLORIDE, SODIUM LACTATE AND CALCIUM CHLORIDE: 600; 310; 30; 20 INJECTION, SOLUTION INTRAVENOUS at 09:00

## 2020-07-04 RX ADMIN — ONDANSETRON 4 MG: 2 INJECTION INTRAMUSCULAR; INTRAVENOUS at 23:06

## 2020-07-04 RX ADMIN — Medication 95 MILLI-UNITS/MIN: at 10:52

## 2020-07-04 RX ADMIN — Medication 0.2 MG: at 07:40

## 2020-07-04 RX ADMIN — MORPHINE SULFATE 0.2 MG: 1 INJECTION EPIDURAL; INTRATHECAL; INTRAVENOUS at 07:20

## 2020-07-04 RX ADMIN — ONDANSETRON 4 MG: 2 INJECTION INTRAMUSCULAR; INTRAVENOUS at 07:44

## 2020-07-04 RX ADMIN — SODIUM CHLORIDE, POTASSIUM CHLORIDE, SODIUM LACTATE AND CALCIUM CHLORIDE: 600; 310; 30; 20 INJECTION, SOLUTION INTRAVENOUS at 07:10

## 2020-07-04 RX ADMIN — SODIUM CHLORIDE 1000 ML: 9 INJECTION, SOLUTION INTRAVENOUS at 00:56

## 2020-07-04 RX ADMIN — KETOROLAC TROMETHAMINE 30 MG: 30 INJECTION, SOLUTION INTRAMUSCULAR; INTRAVENOUS at 07:44

## 2020-07-04 RX ADMIN — Medication 95 MILLI-UNITS/MIN: at 09:00

## 2020-07-04 RX ADMIN — SODIUM CITRATE AND CITRIC ACID MONOHYDRATE 30 ML: 500; 334 SOLUTION ORAL at 06:44

## 2020-07-04 RX ADMIN — FERROUS SULFATE TAB 325 MG (65 MG ELEMENTAL FE) 325 MG: 325 (65 FE) TAB at 19:01

## 2020-07-04 RX ADMIN — KETOROLAC TROMETHAMINE 30 MG: 30 INJECTION, SOLUTION INTRAMUSCULAR at 14:27

## 2020-07-04 RX ADMIN — SODIUM CHLORIDE: 9 INJECTION, SOLUTION INTRAVENOUS at 02:08

## 2020-07-04 RX ADMIN — MORPHINE SULFATE 2 MG: 1 INJECTION EPIDURAL; INTRATHECAL; INTRAVENOUS at 08:14

## 2020-07-04 RX ADMIN — SODIUM CHLORIDE, POTASSIUM CHLORIDE, SODIUM LACTATE AND CALCIUM CHLORIDE: 600; 310; 30; 20 INJECTION, SOLUTION INTRAVENOUS at 07:58

## 2020-07-04 RX ADMIN — PHENYLEPHRINE HYDROCHLORIDE 100 MCG: 10 INJECTION INTRAVENOUS at 07:47

## 2020-07-04 RX ADMIN — METHYLERGONOVINE MALEATE 200 MCG: 0.2 TABLET ORAL at 20:24

## 2020-07-04 RX ADMIN — DOCUSATE SODIUM 100 MG: 100 CAPSULE, LIQUID FILLED ORAL at 09:46

## 2020-07-04 RX ADMIN — CEFAZOLIN 2 G: 1 INJECTION, POWDER, FOR SOLUTION INTRAMUSCULAR; INTRAVENOUS at 06:44

## 2020-07-04 RX ADMIN — KETOROLAC TROMETHAMINE 30 MG: 30 INJECTION, SOLUTION INTRAMUSCULAR at 20:24

## 2020-07-04 RX ADMIN — PHENYLEPHRINE HYDROCHLORIDE 100 MCG: 10 INJECTION INTRAVENOUS at 07:22

## 2020-07-04 RX ADMIN — AZITHROMYCIN 500 MG: 500 INJECTION, POWDER, LYOPHILIZED, FOR SOLUTION INTRAVENOUS at 07:01

## 2020-07-04 RX ADMIN — SODIUM CHLORIDE, POTASSIUM CHLORIDE, SODIUM LACTATE AND CALCIUM CHLORIDE: 600; 310; 30; 20 INJECTION, SOLUTION INTRAVENOUS at 10:45

## 2020-07-04 RX ADMIN — CEFAZOLIN 1 G: 1 INJECTION, POWDER, FOR SOLUTION INTRAMUSCULAR; INTRAVENOUS at 15:55

## 2020-07-04 RX ADMIN — ONDANSETRON 4 MG: 2 INJECTION INTRAMUSCULAR; INTRAVENOUS at 14:27

## 2020-07-04 RX ADMIN — Medication 500 ML/HR: at 07:41

## 2020-07-04 RX ADMIN — METHYLERGONOVINE MALEATE 200 MCG: 0.2 INJECTION, SOLUTION INTRAMUSCULAR; INTRAVENOUS at 10:45

## 2020-07-04 RX ADMIN — PHENYLEPHRINE HYDROCHLORIDE 100 MCG: 10 INJECTION INTRAVENOUS at 07:55

## 2020-07-04 RX ADMIN — SODIUM CHLORIDE: 9 INJECTION, SOLUTION INTRAVENOUS at 07:00

## 2020-07-04 RX ADMIN — DEXAMETHASONE SODIUM PHOSPHATE 4 MG: 4 INJECTION, SOLUTION INTRA-ARTICULAR; INTRALESIONAL; INTRAMUSCULAR; INTRAVENOUS; SOFT TISSUE at 07:51

## 2020-07-04 RX ADMIN — DOCUSATE SODIUM 100 MG: 100 CAPSULE, LIQUID FILLED ORAL at 20:24

## 2020-07-04 RX ADMIN — METHYLERGONOVINE MALEATE 200 MCG: 0.2 TABLET ORAL at 13:08

## 2020-07-04 RX ADMIN — PHENYLEPHRINE HYDROCHLORIDE 100 MCG: 10 INJECTION INTRAVENOUS at 08:22

## 2020-07-04 RX ADMIN — SODIUM CHLORIDE, POTASSIUM CHLORIDE, SODIUM LACTATE AND CALCIUM CHLORIDE: 600; 310; 30; 20 INJECTION, SOLUTION INTRAVENOUS at 21:41

## 2020-07-04 RX ADMIN — DEXTROSE MONOHYDRATE 2.5 MILLION UNITS: 50 INJECTION, SOLUTION INTRAVENOUS at 04:53

## 2020-07-04 RX ADMIN — DEXTROSE MONOHYDRATE 5 MILLION UNITS: 5 INJECTION INTRAVENOUS at 00:57

## 2020-07-04 ASSESSMENT — ENCOUNTER SYMPTOMS: STRIDOR: 0

## 2020-07-04 ASSESSMENT — PULMONARY FUNCTION TESTS
PIF_VALUE: 0
PIF_VALUE: 1
PIF_VALUE: 0
PIF_VALUE: 1
PIF_VALUE: 0
PIF_VALUE: 1
PIF_VALUE: 0
PIF_VALUE: 6
PIF_VALUE: 0

## 2020-07-04 ASSESSMENT — PAIN SCALES - GENERAL
PAINLEVEL_OUTOF10: 3
PAINLEVEL_OUTOF10: 2
PAINLEVEL_OUTOF10: 0
PAINLEVEL_OUTOF10: 2

## 2020-07-04 ASSESSMENT — PAIN DESCRIPTION - PAIN TYPE: TYPE: ACUTE PAIN

## 2020-07-04 ASSESSMENT — PAIN DESCRIPTION - LOCATION: LOCATION: ABDOMEN

## 2020-07-04 NOTE — FLOWSHEET NOTE
Patient admitted to room 168 from ED per wheelchair. Here with c/o contractions and leaking of fluid. Denies vaginal bleeding. Denies N/V/D. Denies fever/chills. Relates of + fetal movement. Assisted into bed, Siderails up x2. Call light in reach. Bed in low position. Oriented to room, surroundings, call system and plan of care. Patient verbalizes understanding. EFM applied and monitor test completed/passed.

## 2020-07-04 NOTE — PROGRESS NOTES
Obstetric/Gynecology Resident Interval Note    Most recent lab results reviewed. Hgb stable 8.5 from 8.4 previously. Bleeding stable, VSS. Plan for repeat H&H in the AM.    Vitals:    07/04/20 1155 07/04/20 1225 07/04/20 1255 07/04/20 1415   BP: 115/69 108/62 114/77 108/62   Pulse: 88 92 96 93   Resp: 15  16 16   Temp:   99 °F (37.2 °C) 98.4 °F (36.9 °C)   TempSrc:   Infrared Infrared   SpO2: 98% 98% 98%    Weight:       Height:            Recent Results (from the past 6 hour(s))   HEMOGLOBIN AND HEMATOCRIT, BLOOD    Collection Time: 07/04/20 10:53 AM   Result Value Ref Range    Hemoglobin 8.4 (L) 12.0 - 16.0 g/dL    Hematocrit 26.1 (L) 36 - 46 %   HEMOGLOBIN AND HEMATOCRIT, BLOOD    Collection Time: 07/04/20  4:01 PM   Result Value Ref Range    Hemoglobin 8.5 (L) 12.0 - 16.0 g/dL    Hematocrit 26.0 (L) 36 - 46 %      Continue routine postop care.     Leonardo Rossi DO  OB/GYN Resident, PGY2  [facility]  7/4/2020, 4:18 PM

## 2020-07-04 NOTE — DISCHARGE SUMMARY
Obstetric Discharge Summary  [facility]    Patient Name: Xochitl Rausch  Patient : 1985  Primary Care Physician: Mac Urias M.D., MD  Admit Date: 7/3/2020    Principal Diagnosis: IUP at 38w5d, admitted for Spontaneous Labor    Her pregnancy has been complicated by:   Patient Active Problem List   Diagnosis    ASCUS with positive high risk HPV cervical    Atypical squamous cell changes of undetermined significance (ASCUS) on cervical cytology with positive high risk human papilloma virus (HPV)    HPV in female    Hx of pancreatitis    Depression/Anxiety    Migraine without aura    History of alcohol abuse    History of cholecystectomy    Elective  X 2    Hx of forceps delivery in prior pregnancy, currently pregnant     BMI 39.0-39.9,adult    Cholelithiasis and cholecystitis without obstruction    Alcohol abuse    Disorder of lung    Epigastric pain    Headache    Microscopic hematuria    Overweight    Plantar fascial fibromatosis    Tinea corporis    Cervical atypism    GBS (group B Streptococcus carrier), +RV culture, currently pregnant    LGA (large for gestational age) fetus affecting management of mother 2020    Elevated 1hr GTT, normal 3hr GTT    Chlamydia trachomatis infection in pregnancy on 20 (needs COLLEEN)    Rh+/RI/GBS bacteruria    Patient-requested procedure    38 weeks gestation of pregnancy    PLTCS 20 F Apg 8/9 Wt 8#11       Infection Present?: No  Hospital Acquired: No    Surgical Operations & Procedures:  [] Pitocin Induction of Labor  [] Pitocin Augmentation of Labor  [] Prostaglandin Induction of Labor  [] Mechanical Induction of Labor  [] Artificial Rupture of Membranes  [] Intrauterine Pressure Catheter  [] Fetal Scalp Electrode  [] Amnioinfusion  Analgesia: spinal  Delivery Type:  Delivery: See Labor and Delivery Summary   Laceration(s): Absent    Consultations:  NICU and Anesthesia    Pertinent Findings & Procedures:   Salbador Patel is a 36 y.o. female [id number] at 38w5d admitted to [address] [place] [address] on 7/3/20 for contractions and leakage of fluid. Patient received 1L NS and Pen G overnight. Patient was found to be in spontaneous labor and had SROM (light meconium stained). Patient requested primary  section secondary to suspected LGA and history of prior vaginal delivery requiring forceps. She received Ancef, Azithromycin and Bicitra pre-operatively. She delivered by primary low transverse  a Live Born infant on 20. Information for the patient's :  Missy Mecmichelle Girl Rosalino Seen [[id number]]   female  Birth Weight: 8 lb 10.6 oz (3.929 kg)      Apgars: 8 at 1 minute and 9 at 5 minutes. Postpartum course: abnormal  post partum hemorrhage. POD#0: Patient was noted to have a QBL 1172 post-operatively, meeting criteria for postpartum hemorrhage. She continued to have some bleeding and QBL increase to 2080, The patient received Methergine 200mcg IM x1 and was started on Methergine PO TID. Hgb was trended, 10.7>8.4>8.5. POD#1: Hgb 8.3, down from 8.5 most recently yesterday. Patient started on oral iron supplementation and given order for H/H to be completed two weeks postpartum.      Course of patient: complicated by [address] [facility] [address]    Discharge to: Home    Readmission planned: no     Recommendations on Discharge:     Medications:      Medication List      START taking these medications    docusate sodium 100 MG capsule  Commonly known as:  COLACE  Take 1 capsule by mouth 2 times daily as needed for Constipation     ferrous sulfate 325 (65 Fe) MG EC tablet  Commonly known as:  Fe Tabs  Take 1 tablet by mouth 2 times daily     ibuprofen 800 MG tablet  Commonly known as:  ADVIL;MOTRIN  Take 1 tablet by mouth every 8 hours as needed for Pain     oxyCODONE-acetaminophen 5-325 MG per tablet  Commonly known as:  Percocet  Take 1 tablet by mouth every 6 hours as needed for Pain for up to 5 days. Intended supply: 5 days. Take lowest dose possible to manage pain        CONTINUE taking these medications    acetaminophen 325 MG tablet  Commonly known as:  TYLENOL     famotidine 20 MG tablet  Commonly known as:  PEPCID  Take 1 tablet by mouth 2 times daily     Prenatal Vitamin and Mineral 28-0.8 MG Tabs  Take 1 tablet by mouth daily           Where to Get Your Medications      You can get these medications from any pharmacy    Bring a paper prescription for each of these medications  · docusate sodium 100 MG capsule  · ferrous sulfate 325 (65 Fe) MG EC tablet  · ibuprofen 800 MG tablet  · oxyCODONE-acetaminophen 5-325 MG per tablet          Activity: pelvic rest x 6 weeks, no driving on narcotics, no lifting greater than 15 lbs  Diet: regular diet  Follow up: 7/10/20 for HERRERA removal    Condition on discharge: stable    Discharge date: 7/7/20    Bhumi Saxena DO  Ob/Gyn Resident    Comments:  Home care and follow-up care were reviewed. Pelvic rest, and birth control were reviewed. Signs and symptoms of mastitis and post partum depression were reviewed. The patient is to notify her physician if any of these occur. The patient was counseled on secondary smoke risks and the increased risk of sudden infant death syndrome and respiratory problems to her baby with exposure. She was counseled on various alternate recommendations to decrease the exposure to secondary smoke to her children.

## 2020-07-04 NOTE — H&P
 GBS bacteriuria    Body mass index (bmi) 36.0-36.9, adult    HRP (high risk pregnancy), unspecified trimester    Cholelithiasis and cholecystitis without obstruction    Alcohol abuse    Disorder of lung    Epigastric pain    Headache    Microscopic hematuria    Overweight    Plantar fascial fibromatosis    Tinea corporis    Cervical atypism    Obesity with body mass index 30 or greater    High-risk pregnancy    GBS (group B Streptococcus carrier), +RV culture, currently pregnant    LGA (large for gestational age) fetus affecting management of mother 2020    40 weeks gestation of pregnancy    Elevated 1hr GTT, normal 3hr GTT    Chlamydia trachomatis infection in pregnancy on 20 (needs COLLEEN)        Steroids Given In This Pregnancy:  no     REVIEW OF SYSTEMS:   Constitutional: negative fever, negative chills, negative weight changes   HEENT: negative visual disturbances, negative headaches, negative dizziness, negative hearing loss  Breast: Negative breast abnormalities, negative breast lumps, negative nipple discharge  Respiratory: negative dyspnea, negative cough, negative SOB  Cardiovascular: negative chest pain,  negative palpitations, negative arrhythmia, negative syncope   Gastrointestinal: + abdominal pain (Ctx), negative RUQ pain, negative N/V, negative diarrhea, negative constipation, negative bowel changes, negative heartburn   Genitourinary: negative dysuria, negative hematuria, negative urinary incontinence, + vaginal discharge  Dermatological: negative rash, negative pruritis, negative mole or other skin changes  Hematologic: negative bruising  Immunologic/Lymphatic: negative recent illness, negative recent sick contact  Musculoskeletal: negative back pain, negative myalgias, negative arthralgias  Neurological:  negative dizziness, negative migraines, negative seizures, negative weakness  Behavior/Psych: negative depression, negative anxiety, negative SI, negative HI        OBSTETRICAL HISTORY:   OB History    Para Term  AB Living   5 2 2 0 2 2   SAB TAB Ectopic Molar Multiple Live Births   0 2 0 0 0 0      # Outcome Date GA Lbr Henrik/2nd Weight Sex Delivery Anes PTL Lv   5 Current            4 Term 02/15/09 39w0d 07:00 7 lb 12 oz (3.515 kg) F Vag-Spont None        Name: Aristeo Martinez   3 Term 06 40w0d 07:00 8 lb 5 oz (3.771 kg) F Vag-Forceps None        Name: Lakeisha Sunshine   2 TAB            1 TAB                PAST MEDICAL HISTORY:   has a past medical history of Alcohol abuse, Atypical squamous cell changes of undetermined significance (ASCUS) on cervical cytology with positive high risk human papilloma virus (HPV), Chlamydia, Depressive disorder, Generalized anxiety disorder, History of anxiety, HPV in female, Migraines, Mixed anxiety and depressive disorder, Seasonal allergies, Strep throat, and Tonsillitis. PAST SURGICAL HISTORY:   has a past surgical history that includes Cholecystectomy; Plantar fascia surgery (Right, 2018); pr incision of foot/toe fascia (Right, 2018); Foot surgery (Left, 2019); Plantar fascia surgery (Left, 2019); and laparoscopy. ALLERGIES:  is allergic to no known allergies and seasonal.    MEDICATIONS:  Prior to Admission medications    Medication Sig Start Date End Date Taking? Authorizing Provider   famotidine (PEPCID) 20 MG tablet Take 1 tablet by mouth 2 times daily 20   Corky Dinning, DO   acetaminophen (TYLENOL) 325 MG tablet Take 650 mg by mouth 20   Historical Provider, MD   Prenatal Vit-Fe Fumarate-FA (PRENATAL VITAMIN AND MINERAL) 28-0.8 MG TABS Take 1 tablet by mouth daily 19   Alexa Kimble, APRN - CNP       FAMILY HISTORY:  family history includes Bipolar Disorder in her paternal uncle;  Cancer in her paternal grandmother; Diabetes in her paternal grandfather; Hypertension in her father and paternal grandmother; Kidney Disease in her paternal grandfather; No Known Problems in her mother; Other in her paternal grandfather. SOCIAL HISTORY:   reports that she has never smoked. She has never used smokeless tobacco. She reports previous alcohol use. She reports that she does not use drugs. VITALS:  Vitals:    07/03/20 2334   BP: 127/72   Pulse: 83   Resp: 18   Temp: 98.3 °F (36.8 °C)   TempSrc: Oral   Weight: 235 lb (106.6 kg)   Height: 5' 5\" (1.651 m)         PHYSICAL EXAM:  Fetal Heart Monitor:  Baseline Heart Rate 145, moderate variability, present accelerations, absent decelerations  [place]: contractions, regular, every 3-4 minutes    General appearance:  no apparent distress, alert and cooperative  HEENT: head atraumatic, normocephalic, moist mucous membranes, trachea midline  Neurologic:  alert, oriented, normal speech, no focal findings or movement disorder noted  Lungs:  No increased work of breathing, good air exchange, clear to auscultation bilaterally, no crackles or wheezing  Heart:  regular rate and rhythm and no murmur, rubs, gallops  Abdomen:  soft, gravid, non-tender, no right upper quadrant tenderness, no CVA tenderness, uterus non-tender, no signs of abruption and no signs of chorioamnionitis  Extremities:  no calf tenderness, non edematous, no varicosities, full range of motion in all four extremities  Musculoskeletal: Gross strength equal and intact throughout, no gross abnormalities, range of motion normal in hips, knees, shoulders and spine  Psychiatric: Mood appropriate, normal affect     Pelvic Exam:  Vulva: normal appearing vulva, no masses, tenderness or lesions, normal clitoris  Vagina: normal appearing vagina with normal color and discharge, no lesions  Cervix: no cervical motion tenderness  Uterus: is gravid, normal size, shape, consistency and non-tender     Speculum: normal appearing cervix without pathologic appearing discharge or lesions. No blood noted in vaginal vault or coming from cervical os.   Cervical os not well visualized secondary to redundant tissue. Cervix Check: 2 cm dilated, 50 % effaced, -2 station, posterior position, medium consistency, Cephalic confirmed by bedside US    Rectal Exam: not indicated     DATA:  Membranes Ruptured: Unknown. Test equivocal  Fern: negative  Nitrazine: Positive  Valsalva/Pooling: present   Vaginal Bleeding: absent      LIMITED BEDSIDE US:  Position: Cephalic  Placental Location: posterior  Fetal Heart Tones: Present  Fetal Movement: Present  Amniotic Fluid Index: 7.5 cm  Estimated Fetal Weight:  8 lbs 10oz    PRENATAL LAB RESULTS:   Blood Type/Rh: B pos  Antibody Screen: negative  Hemoglobin, Hematocrit, Platelets: 48.7 / 70.8 / 359  Rubella: immune  T. Pallidum, IgG: non-reactive   Hepatitis B Surface Antigen: non-reactive   HIV: non-reactive   Sickle Cell Screen: not done  Gonorrhea: negative  Chlamydia: positive 20  Urine culture: positive, GBS bacteruria date: 20    1 hour Glucose Tolerance Test: 148  3 hour Glucose Tolerance Test:  Fastin; 1 hour: 174; 2 hour  135; 3 hour: 128    Group B Strep: GBS bacteruria date: 20  Cystic Fibrosis Screen: negative  First Trimester Screen: low risk  MSAFP/Multiple Markers: Increased risk for NTD from 1:1030 to 1:934  Non-Invasive Prenatal Testing: not done  Anatomy US: posterior placenta (complete previa RSLVD), 3VC/Femlae/Normal Anatomy    ASSESSMENT & PLAN:  Margo Cutler is a 36 y.o. female [id number] at 38w4d    - GBS bacteruria / Rh positive / R immune   - Pen G for GBS prophylaxis     Leakage of Fluid/Contraction   - SSE  Pooling positive. Valsalva negative   - Nitrazine positive   - Ferning negative   - UA pending   - Ctx q 3-4    - EFW 8#10   -admit to L&D under service of Dr. Becki Tuttle   - CBC, T&S, CoAgs, T.pal, UA w/ reflex, UDS pending    - IV fluids: NS bolus 1L then  @ 125 cc/hr   - CEFM/ TOCO   - Diet: NPO  - UDS ordered, Informed consent obtained. Discussed R/B/A. All questions and concerns answered.  Patient verbalized understanding and fibromatosis 2020    HRP (high risk pregnancy), unspecified trimester 2020    High-risk pregnancy 2020    Body mass index (bmi) 36.0-36.9, adult 2020 Early 1 hour GTT  2020 advise fetal kick counts instead of NSTs from 32 weeks per [facility] Guidance for COVID-19.  Obesity with body mass index 30 or greater 2020    GBS bacteriuria 2020 treated with Ampicillin      Elective  X 2 04/15/2019    History of alcohol abuse 2019    History of cholecystectomy 2019    Epigastric pain 2019    Hx of pancreatitis 2017    Alcohol abuse 2017    Tinea corporis 2017    ASCUS with positive high risk HPV cervical 2016    Atypical squamous cell changes of undetermined significance (ASCUS) on cervical cytology with positive high risk human papilloma virus (HPV) 2016    Cervical atypism 2016    HPV in female 2016    Microscopic hematuria 2014    Disorder of lung 2014    Cholelithiasis and cholecystitis without obstruction 2014    Migraine without aura 2013    Headache 2013    Depression/Anxiety 2013    Overweight 2013              Plan discussed with Dr. Baljinder Call, who is agreeable. Steroids given this admission: No    Risks, benefits, alternatives and possible complications have been discussed in detail with the patient. Admission, and post admission procedures and expectations were discussed in detail. All questions were answered.     Attending's Name: Dr. Artemio Green,   Ob/Gyn Resident  7/3/2020, 11:26 PM

## 2020-07-04 NOTE — L&D DELIVERY NOTE
Mother's Information    Labor Events     labor?:  No     Mother Delivery Information    Surgical or Additional Est. Blood Loss (mL):  0 (View Only):  Edit in Flowsheets   Combined Est. Blood Loss (mL):  0        Emelia Baby Girl Gerardo Yarbrough [[id number]]    Labor Events     labor?:  No   steroids?:  None  Cervical ripening date/time:     Antibiotics received during labor?:  Yes  Rupture date/time:     Rupture type:  Spontaneous=SROM  Fluid color:  Meconium  Meconium consistency:  Light, Thin  Induction:  None  Augmentation:  None  Labor complications:  None          Anesthesia    Method:  Spinal  Analgesics:  DURAMORPH IJ     Assisted Delivery Details    Forceps attempted?:  No  Vacuum extractor attempted?:  No     Document Additional Attempt       Document Additional Attempt             Shoulder Dystocia    Shoulder dystocia present?:  No  Add Second Maneuver  Add Third Maneuver  Add Fourth Maneuver  Add Fifth Maneuver  Add Sixth Maneuver  Add Seventh Maneuver  Add Eighth Maneuver  Add Ninth Maneuver     [place] Presentation    Presentation:  Vertex  _:  Occiput  _:  Posterior     [place] Information    Head delivery date/time:  2020 07:37:00   Changing the 's delivery date/time could affect patient care.:     Delivery date/time:   20 1316   Delivery type:  , Low Transverse    Details:   Trial of labor?:  No    categorization:  Primary    priority:  Non-scheduled   Indications for :  Macrosomia, Other   Skin Incision Type:  Pfannenstiel   Uterine Incision:  Low Transverse         Delivery Providers    Delivering clinician:  Marisol Garner DO   Provider Role    Marisol Garner, DO Obstetrician    Joselito Yan, DO Resident    Caryle Polite, RN Delivery Nurse    Sugey Browning OB Tech    Julia Schneider MD Anesthesiologist    Slava Oconnell, CAMPOS Certified Registered Nurse    Veronica Bray RCP Respiratory Therapist (Day)      Cord Vessels:  3 Vessels  Complications:  None  Delayed cord clamping?:  Yes  Cord clamped date/time:  2020 0738  Gases sent?:  No  Cord comments:  unable to obtain   Stem cell collection (by provider):   No     Placenta    Date/time:  2020 07:40:00  Removal:  Spontaneous  Appearance:  Intact  Disposition:  Lab, Pathology     Delivery Resuscitation    Method:  Bulb Suction, Stimulation     Apgars    Living status:  Living  Apgars   1 Minute:   5 Minute:   10 Minute 15 Minute 20 Minute   Skin Color: 0  1       Heart Rate: 2  2       Reflex Irritability: 2  2       Muscle Tone: 2  2       Respiratory Effort: 2  2       Total: 8  9               Apgars Assigned By:  Fior HOLLY      Measurements       Delivery Information    Surgical or additional est. blood loss (mL):  0 (View Only):  Edit in Flowsheets   Combined est. blood loss (mL):  0

## 2020-07-04 NOTE — FLOWSHEET NOTE
Dr. Maggie Fong notified of additional 525ml blood loss, few small clots. Fundus firm. VSS. Pitocin rate increased to 999ml/hr. Awaiting additional orders.

## 2020-07-04 NOTE — PROGRESS NOTES
Obstetric/Gynecology Resident Interval Note    Patient evaluated due to continued vaginal bleeding. Patient is POD#0 from a PLTCS. QBL during case of 1172 and 209 immediately postoperatively. Patient received second bag of pitocin. Additional clots were expressed totaling QBL of 525. Orders for stat H&H placed showing Hgb decreased from 10.7>8.4. She received Methergine 200mcg IM x1. Upon examination, the patient denies any dizziness, lightheadedness, or palpitations. Vitals are stable, afebrile. Fundus is firm, however small amount of oozing noted upon palpation. Bimanual exam performed, uterus noted to be full of blood clot. Blood clots manually removed, total QBL since surgery now 2080. Patient continues to remain asymptomatic. Bleeding improved. Vitals:    07/04/20 0950 07/04/20 1005 07/04/20 1020 07/04/20 1050   BP: (!) 112/56 (!) 106/57 (!) 107/56 107/71   Pulse: 97 86 93 108   Resp: 15 15 15    Temp:    97.7 °F (36.5 °C)   TempSrc:    Infrared   SpO2: 100% 99%     Weight:       Height:            Will repeat H&H @1600 and start PO methergine TID. Continue to monitor carefully, patient stable at this time.     Jermaine Mora DO  OB/GYN Resident, PGY2  [facility]  7/4/2020, 11:49 AM

## 2020-07-04 NOTE — FLOWSHEET NOTE
Dr. Toledo Bacon called to room to evaluate BP. IV fluid rate 999ml/hr. Patient denies lightheadedness.

## 2020-07-04 NOTE — ANESTHESIA PRE PROCEDURE
Department of Anesthesiology  Preprocedure Note       Name:  Ally Proctor   Age:  36 y.o.  :  1985                                          MRN:  [id number]         Date:  2020      Surgeon: Karen Cao):  Radha Nunn DO    Procedure: Procedure(s):   SECTION/PRIMARY    Medications prior to admission:   Prior to Admission medications    Medication Sig Start Date End Date Taking? Authorizing Provider   famotidine (PEPCID) 20 MG tablet Take 1 tablet by mouth 2 times daily 20   Kranthi Bustos DO   acetaminophen (TYLENOL) 325 MG tablet Take 650 mg by mouth 20   Historical Provider, MD   Prenatal Vit-Fe Fumarate-FA (PRENATAL VITAMIN AND MINERAL) 28-0.8 MG TABS Take 1 tablet by mouth daily 19   Chay Kimble, APRN - CNP       Current medications:    Current Facility-Administered Medications   Medication Dose Route Frequency Provider Last Rate Last Dose    sodium chloride flush 0.9 % injection 10 mL  10 mL Intravenous 2 times per day Stuart Sims DO        sodium chloride flush 0.9 % injection 10 mL  10 mL Intravenous PRN Stuart Sims DO        oxytocin (PITOCIN) 30 units in 500 mL infusion  1 pratik-units/min Intravenous Continuous Joselin Sims DO        ondansetron (ZOFRAN) injection 4 mg  4 mg Intravenous Q6H PRN Joselin Sims DO        0.9 % sodium chloride infusion   Intravenous Continuous Joselin Sims  mL/hr at 20 0700      penicillin G potassium 2.5 Million Units in dextrose 5 % 100 mL IVPB  2.5 Million Units Intravenous Q4H Joselin Sims DO   Stopped at 20 0523    azithromycin (ZITHROMAX) 500 mg in D5W 250ml addavial  500 mg Intravenous Once Joselin Sims DO           Allergies:     Allergies   Allergen Reactions    No Known Allergies     Seasonal        Problem List:    Patient Active Problem List   Diagnosis Code    ASCUS with positive high risk HPV cervical R87.610, R87.810    Atypical squamous cell changes of undetermined significance (ASCUS) on cervical cytology with positive high risk human papilloma virus (HPV) R87.610, R87.810    HPV in female B80.11    Hx of pancreatitis K85.90    Depression/Anxiety F32.9    Migraine without aura G43.009    History of alcohol abuse F10.11    History of cholecystectomy Z90.49    Elective  X 2 Z33.2    Hx of forceps delivery in prior pregnancy, currently pregnant  O09.299    Body mass index (bmi) 36.0-36.9, adult Z68.36    Cholelithiasis and cholecystitis without obstruction K80.10    Alcohol abuse F10.10    Disorder of lung J98.4    Epigastric pain R10.13    Headache R51    Microscopic hematuria R31.29    Overweight E66.3    Plantar fascial fibromatosis M72.2    Tinea corporis B35.4    Cervical atypism N87.9    GBS (group B Streptococcus carrier), +RV culture, currently pregnant O99.820    LGA (large for gestational age) fetus affecting management of mother 2020 O36.60X0    Elevated 1hr GTT, normal 3hr GTT O99.810    Chlamydia trachomatis infection in pregnancy on 20 (needs COLLEEN) O98.319, A56.8    Rh+/RI/GBS bacteruria O09.93       Past Medical History:        Diagnosis Date    Alcohol abuse 2017    Atypical squamous cell changes of undetermined significance (ASCUS) on cervical cytology with positive high risk human papilloma virus (HPV) 16    Chlamydia 10/9/12    Depressive disorder 2013    is doing well now, no longer on medication    Generalized anxiety disorder     History of anxiety     HPV in female 2016    Migraines     Mixed anxiety and depressive disorder     Seasonal allergies     Strep throat 2018    Tonsillitis 2018       Past Surgical History:        Procedure Laterality Date    CHOLECYSTECTOMY      FOOT SURGERY Left 2019    Plantar fasciectomy left foot     LAPAROSCOPY      PLANTAR FASCIA SURGERY Right 2018    PLANTAR FASCIOTOMY RIGHT AND PRP INJECTION RIGHT FOOT - CELLSAVER    PLANTAR FASCIA SURGERY Left 9/13/2019    OPEN PLANTAR FASCIECTOMY WITH PRP INJECTION LEFT performed by Mason Sheridan DPM at [address] [place] [address] FOOT/TOE FASCIA Right 9/7/2018    PLANTAR FASCIOTOMY RIGHT AND PRP INJECTION RIGHT FOOT - CELLSAVER performed by Mason Sheridan DPM at [place] [address] History:    Social History     Tobacco Use    Smoking status: Never Smoker    Smokeless tobacco: Never Used   Substance Use Topics    Alcohol use: Not Currently     Alcohol/week: 0.0 standard drinks     Comment: Occassional Social                                Counseling given: Not Answered      Vital Signs (Current):   Vitals:    07/03/20 2334 07/04/20 0245 07/04/20 0626   BP: 127/72 123/64 105/63   Pulse: 83 73 69   Resp: 18 16 14   Temp: 98.3 °F (36.8 °C) 96.6 °F (35.9 °C) 97.2 °F (36.2 °C)   TempSrc: Oral Infrared Infrared   Weight: 235 lb (106.6 kg)     Height: 5' 5\" (1.651 m)                                                BP Readings from Last 3 Encounters:   07/04/20 105/63   07/02/20 120/80   06/25/20 114/71       NPO Status:                                                                                 BMI:   Wt Readings from Last 3 Encounters:   07/03/20 235 lb (106.6 kg)   07/02/20 235 lb (106.6 kg)   06/23/20 236 lb (107 kg)     Body mass index is 39.11 kg/m².     CBC:   Lab Results   Component Value Date    WBC 11.8 07/04/2020    RBC 4.38 07/04/2020    HGB 10.7 07/04/2020    HCT 32.8 07/04/2020    MCV 74.7 07/04/2020    RDW 14.7 07/04/2020     07/04/2020       CMP:   Lab Results   Component Value Date     02/28/2020    K 3.7 02/28/2020     02/28/2020    CO2 20 02/28/2020    BUN 7 02/28/2020    CREATININE 0.36 02/28/2020    GFRAA >60 02/28/2020    LABGLOM >60 02/28/2020    GLUCOSE 93 06/23/2020    PROT 6.9 02/28/2020    CALCIUM 8.7 02/28/2020    BILITOT 0.29 02/28/2020    ALKPHOS 62 02/28/2020    AST 17 02/28/2020    ALT 15 02/28/2020       POC Tests: No results for input(s): POCGLU, POCNA, POCK, POCCL, POCBUN, POCHEMO, POCHCT in the last 72 hours. Coags:   Lab Results   Component Value Date    PROTIME 12.5 07/04/2020    INR 1.0 07/04/2020    APTT 29.3 07/04/2020       HCG (If Applicable):   Lab Results   Component Value Date    PREGTESTUR negative 05/20/2016    HCG NEGATIVE 09/13/2019    HCGQUANT 47,755 (H) 01/06/2020        ABGs: No results found for: PHART, PO2ART, CXD7NFW, JTE4HLK, BEART, L7XZRHGD     Type & Screen (If Applicable):  No results found for: LABABO, LABRH    Drug/Infectious Status (If Applicable):  No results found for: HIV, HEPCAB    COVID-19 Screening (If Applicable):   Lab Results   Component Value Date    COVID19 Not Detected 07/02/2020    COVID19 Not Detected 06/25/2020         Anesthesia Evaluation  Patient summary reviewed and Nursing notes reviewed no history of anesthetic complications:   Airway: Mallampati: III  TM distance: >3 FB   Neck ROM: full  Mouth opening: > = 3 FB Dental: normal exam         Pulmonary:Negative Pulmonary ROS breath sounds clear to auscultation      (-) rhonchi, wheezes, rales and stridor                           Cardiovascular:Negative CV ROS        (-) murmur, weak pulses,  friction rub, systolic click, carotid bruit,  JVD and peripheral edema      Rhythm: regular  Rate: normal                    Neuro/Psych:   (+) headaches:, psychiatric history:            GI/Hepatic/Renal: Neg GI/Hepatic/Renal ROS            Endo/Other: Negative Endo/Other ROS                    Abdominal:           Vascular:                                        Anesthesia Plan      spinal     ASA 2           MIPS: Prophylactic antiemetics administered. Anesthetic plan and risks discussed with patient.                       Mat Seals MD   7/4/2020

## 2020-07-04 NOTE — FLOWSHEET NOTE
Patient with moderate amount small clots when palpating fundus. Dr. Orlando Suarez called to room to evaluate and updated on BP 96/48, 88/42.     C/S QBL= 1172  Additional 209ml in recovery at this time. Dr. Orlando Suarez evaluated. Fundus firm. 2nd bag of pitocin initiated.

## 2020-07-04 NOTE — PROGRESS NOTES
OBGYN Labor Progress Note    Rosalba Cade is a 36 y.o. female [id number] at 38w5d  The patient was seen and examined. Her pain is well controlled. She reports fetal movement is present, complains of contractions, complains of loss of fluid, denies vaginal bleeding. Vital Signs:  Vitals:    20 2334 20 0245 20 0626   BP: 127/72 123/64 105/63   Pulse: 83 73 69   Resp: 18 16 14   Temp: 98.3 °F (36.8 °C) 96.6 °F (35.9 °C) 97.2 °F (36.2 °C)   TempSrc: Oral Infrared Infrared   Weight: 235 lb (106.6 kg)     Height: 5' 5\" (1.651 m)         FHT: 145, moderate variability, accelerations present, decelerations absent  Contractions: regular, every 3-4 minutes  Cervical Exam: 2-3 cm dilated, 50% effaced, -2 (out of 3) station  Pitocin: @ 0 mu/min    Membranes: Ruptured meconium stained  Scalp Electrode in place: absent  Intrauterine Pressure Catheter in Place: absent    Interventions: none    Assessment/Plan:  Rosalba Cade is a 36 y.o. female [id number] at 38w5d IUP   - GBS bacteriuria / Rh positive / R immune   - Pen G for GBS prophylaxis has been running through out the night    - COVID19 neg 20   - Urine culture pending     Spontaneous Labor/Spontaneous Rupture of Membranes (light meconium)   - Afebrile, VSS   - cEFM and TOCO   - SVE showed 2-3/50/-2 and patient continued to leak through out the night. Will proceed with  section at this time    - Patient has been NPO since midnight   - CS consent in media section    - Anesthesia and NICU aware     - Ancef, Azitho and Bicitra ordered    - Ready to proceed to the OR    Dr. Lopez [place] on unit and in agreement with plan.      Artie Shrestha DO  OBGYN Resident  2020, 6:55 AM

## 2020-07-04 NOTE — ANESTHESIA POSTPROCEDURE EVALUATION
POST- ANESTHESIA EVALUATION       Pt Name: Margo Cutler  MRN: [id number]  YOB: 1985  Date of evaluation: 2020  Time:  10:39 AM      BP (!) 107/56   Pulse 93   Temp 97.2 °F (36.2 °C) (Infrared)   Resp 15   Ht 5' 5\" (1.651 m)   Wt 235 lb (106.6 kg)   LMP 10/07/2019   SpO2 99%   BMI 39.11 kg/m²      Consciousness Level  Awake  Cardiopulmonary Status  Stable  Pain Adequately Treated YES  Nausea / Vomiting  NO  Adequate Hydration  YES  Anesthesia Related Complications NONE      Electronically signed by Zahira Thompson MD on 2020 at 10:39 AM       Department of Anesthesiology  Postprocedure Note    Patient: Margo Cutler  MRN: [id number]  YOB: 1985  Date of evaluation: 2020  Time:  10:39 AM     Procedure Summary     Date:  20 Room / Location:  [address] [place] [address]    Anesthesia Start:  710 Anesthesia Stop:      Procedure:   SECTION/PRIMARY (N/A ) Diagnosis:       (LARGE GESTATIONAL AGE)      (PAT ON ADMIT/ORDER IN [facility])    Surgeon:  Mark Patel DO Responsible Provider:  Zahira Thompson MD    Anesthesia Type:  spinal ASA Status:  2          Anesthesia Type: spinal    Qiana Phase I: Qiana Score: 8    Qiana Phase II:      Last vitals: Reviewed and per EMR flowsheets.        Anesthesia Post Evaluation

## 2020-07-04 NOTE — ANESTHESIA PROCEDURE NOTES
Spinal Block    Patient location during procedure: OR  Start time: 7/4/2020 7:15 AM  End time: 7/4/2020 7:20 AM  Reason for block: primary anesthetic  Staffing  Anesthesiologist: Sparkle Maria MD  Performed: anesthesiologist   Preanesthetic Checklist  Completed: patient identified, site marked, surgical consent, pre-op evaluation, timeout performed, IV checked, risks and benefits discussed, monitors and equipment checked, anesthesia consent given, oxygen available and patient being monitored  Spinal Block  Patient position: sitting  Prep: Betadine  Patient monitoring: cardiac monitor, continuous pulse ox and frequent blood pressure checks  Approach: midline  Location: L4/L5  Provider prep: mask and sterile gloves  Local infiltration: lidocaine  Dose: 0.2  Agent: bupivacaine  Adjuvant: duramorph  Dose: 1.6  Dose: 1.6  Needle  Needle type: Sprotte Tip   Needle gauge: 24 G  Needle length: 4 in  Needle insertion depth: 3 cm  Assessment  Sensory level: T4  Swirl obtained: Yes  CSF: clear  Attempts: 1  Hemodynamics: stable

## 2020-07-04 NOTE — PLAN OF CARE
Problem: Pain:  Goal: Pain level will decrease  Outcome: Ongoing  Goal: Control of acute pain  Outcome: Ongoing  Goal: Control of chronic pain  Outcome: Ongoing     Problem: Venous Thromboembolism - Risk of:  Goal: Will show no signs or symptoms of venous thromboembolism  Outcome: Ongoing

## 2020-07-04 NOTE — PROGRESS NOTES
Patient Name: Frandy Saravia  Patient : 1985  Room/Bed: 1940/2363-63  Admission Date/Time: 7/3/2020 11:24 PM  MRN #: [id number]  [facility] #: [id number]    Date: 2020  Time: 14:37 AM        Frandy Saravia is a 36 y.o. female  OB History    Para Term  AB Living   5 2 2 0 2 2   SAB TAB Ectopic Molar Multiple Live Births   0 2 0 0 0 0      # Outcome Date GA Lbr Henrik/2nd Weight Sex Delivery Anes PTL Lv   5 Current            4 Term 02/15/09 39w0d 07:00 7 lb 12 oz (3.515 kg) F Vag-Spont None        Name: Christy King   3 Term 06 40w0d 07:00 8 lb 5 oz (3.771 kg) F Vag-Forceps None        Name: Kristie Aleman   2 TAB            1 TAB                Gestational Age:  44w7d      The patient was seen and examined. The details of her pelvic exam can be found in the EPIC flow section of her EMR. Her pain  is well controlled. Pt breathing with contractions although pt states contractions are not as intense. Pt denies any more leaking. The baby is moving well. Tracing Review (Date of Tracing): There Is moderate Variability  Vitals:    20 2334   BP: 127/72   Pulse: 83   Resp: 18   Temp: 98.3 °F (36.8 °C)   TempSrc: Oral   Weight: 235 lb (106.6 kg)   Height: 5' 5\" (1.651 m)     FHT's are 130  The tracing is Category 1 . Intervention:  None      Membranes Are: +nitrazine - ferning. BOW palpated on exam  Scalp Electrode in place: No  Intrauterine Pressure Catheter in Place: No          4 Week Lab Review:  Hospital Outpatient Visit on 2020   Component Date Value Ref Range Status    SARS-CoV-2 2020 Not Detected  Not Detected Final    Comment:       The specimen is NEGATIVE for SARS-CoV-2, the novel coronavirus associated with COVID-19. A negative result does not rule out COVID-19. This test has been authorized by the FDA under an Emergency Use Authorization (EUA) for use   by authorized laboratories.         Fact sheet for Healthcare Providers: Ganesh  Fact sheet for Patients: Alisia.es        METHODOLOGY: RT-PCR      SARS-CoV-2, Rapid 07/02/2020        Final    Source 07/02/2020 . NASOPHARYNGEAL SWAB   Final    SARS-CoV-2, PCR 07/02/2020        Final   Hospital Outpatient Visit on 06/25/2020   Component Date Value Ref Range Status    SARS-CoV-2, LIU 06/25/2020 Not Detected  Not Detected Final    Comment: (NOTE)  This test was developed and its performance characteristics  determined by [facility]. This test has not been FDA  cleared or approved. This test has been authorized by FDA under an  Emergency Use Authorization (EUA). This test is only authorized for  the duration of time the declaration that circumstances exist  justifying the authorization of the emergency use of in vitro  diagnostic tests for detection of SARS-CoV-2 virus and/or diagnosis  of COVID-19 infection under section 564(b)(1) of the Act, 21 U. S.C.  290HGU-0(B)(6), unless the authorization is terminated or revoked  sooner. When diagnostic testing is negative, the possibility of a  false negative result should be considered in the context of a  patient's recent exposures and the presence of clinical signs and  symptoms consistent with COVID-19. An individual without symptoms of  COVID-19 and who is not shedding SARS-CoV-2 virus would expect to  have a negative (not detected) result in this assay. Performed                            At:  [address] [id number]  Tereza Ambrosio MD FT:[id number]     Admission on 06/25/2020, Discharged on 06/25/2020   Component Date Value Ref Range Status    Color, UA 06/25/2020 DARK YELLOW* YELLOW Final    Turbidity UA 06/25/2020 CLEAR  CLEAR Final    Glucose, Ur 06/25/2020 NEGATIVE  NEGATIVE Final    Bilirubin Urine 06/25/2020 NEGATIVE  NEGATIVE Final    Ketones, Urine 06/25/2020 TRACE* NEGATIVE Final    Specific Gravity, UA 2020 1.020  1.000 - 1.030 Final    Urine Hgb 2020 NEGATIVE  NEGATIVE Final    pH, UA 2020 7.0  5.0 - 8.0 Final    Protein, UA 2020 NEGATIVE  NEGATIVE Final    Urobilinogen, Urine 2020 Normal  Normal Final    Nitrite, Urine 2020 NEGATIVE  NEGATIVE Final    Leukocyte Esterase, Urine 2020 SMALL* NEGATIVE Final    Urinalysis Comments 2020 NOT REPORTED   Final    - 2020        Final    WBC, UA 2020 5 TO 10  /HPF Final    RBC, UA 2020 2 TO 5  /HPF Final    Casts UA 2020 NOT REPORTED  /LPF Final    Crystals, UA 2020 NOT REPORTED  None /HPF Final    Epithelial Cells UA 2020 10 TO 20  /HPF Final    Renal Epithelial, UA 2020 NOT REPORTED  0 /HPF Final    Bacteria, UA 2020 FEW* None Final    Mucus, UA 2020 NOT REPORTED  None Final    Trichomonas, UA 2020 NOT REPORTED  None Final    Amorphous, UA 2020 NOT REPORTED  None Final    Other Observations UA 2020 NOT REPORTED  NOT REQ. Final    Yeast, UA 2020 NOT REPORTED  None Final    Specimen Description 2020 . CLEAN CATCH URINE   Final    Special Requests 2020 NOT REPORTED   Final    Culture 2020 UROGENITAL KYE PRESENT   Final    Culture 2020 STREPTOCOCCI, BETA HEMOLYTIC GROUP B <80633 CFU/ML Group B strep is identified at any colony count in a female who could potentially be pregnant based on age only. Group B strep isolated in urine at any colony count is considered a surrogate for vaginal rectal colonization in the context of determining maxine- therapy. Treatment for UTI or asymptomatic bacteruria should be based on colony count and clinical symptoms and not on the presence of the organism alone. *  Final    Specimen Description 2020 . VAGINA   Final    Special Requests 2020 NOT REPORTED   Final    Direct Exam 2020 POSITIVE for Gardnerella vaginalis. *  Final    Direct Exam 06/25/2020 NEGATIVE for Trichomonas vaginalis   Final    Direct Exam 06/25/2020 NEGATIVE for Candida sp. Final    Direct Exam 06/25/2020 Method of testing is a DNA probe intended for detection and identification of Candida species, Gardnerella vaginalis, and Trichomonas vaginalis nucleic acid in vaginal fluid specimens from patients with symptoms of vaginitis/vaginosis. Final    Specimen Description 06/25/2020 . CERVIX   Final    C. trachomatis DNA 06/25/2020 POSITIVE: CHLAMYDIA TRACHOMATIS DNA detected by nucleic acid amplification. * NEGATIVE Final    Comment: This test is intended for medical purposes only and is not valid for the evaluation of   suspected sexual abuse or for other forensic purposes. In certain contexts, culture may be required to meet applicable laws and regulations for   diagnosis of C. trachomatis and N. gonorrhoeae infections. Per 2014  CDC recommendations, this test does not include confirmation of positive results   by an alternative nucleic acid target. Results reported to the appropriate Health Department      N. gonorrhoeae DNA 06/25/2020 NEGATIVE  NEGATIVE Final    Comment: NEISSERIA GONORRHOEAE DNA not detected by nucleic acid amplification. This test is intended for medical purposes only and is not valid for the evaluation of   suspected sexual abuse or for other forensic purposes. In certain contexts, culture may be required to meet applicable laws and regulations for   diagnosis of C. trachomatis and N. gonorrhoeae infections. Per 2014  CDC recommendations, this test does not include confirmation of positive results   by an alternative nucleic acid target.       Amount Glucose Given 06/25/2020 100  g Final    Glucose, Fasting 06/25/2020 84  65 - 94 mg/dL Final    Glucose, GTT - 1 Hour 06/25/2020 174  65 - 179 mg/dL Final    Glucose, GTT - 2 Hour 06/25/2020 135  65 - 154 mg/dL Final    3 Hr Glucose 06/25/2020 128  65 - 139 mg/dL Final   [facility]   Component Date Value Ref Range Status    Color, UA 06/23/2020 DARK YELLOW* YELLOW Final    Turbidity UA 06/23/2020 TURBID* CLEAR Final    Glucose, Ur 06/23/2020 NEGATIVE  NEGATIVE Final    Bilirubin Urine 06/23/2020 Presumptive positive. Unable to confirm due to unavailability of reagent. * NEGATIVE Corrected    CORRECTED ON 06/23 AT 1307: PREVIOUSLY REPORTED AS SMALL    Ketones, Urine 06/23/2020 NEGATIVE  NEGATIVE Final    Specific Christmas, UA 06/23/2020 1.021  1.000 - 1.030 Final    Urine Hgb 06/23/2020 NEGATIVE  NEGATIVE Final    pH, UA 06/23/2020 6.5  5.0 - 8.0 Final    Protein, UA 06/23/2020 TRACE* NEGATIVE Final    Urobilinogen, Urine 06/23/2020 Normal  Normal Final    Nitrite, Urine 06/23/2020 NEGATIVE  NEGATIVE Final    Leukocyte Esterase, Urine 06/23/2020 LARGE* NEGATIVE Final    Urinalysis Comments 06/23/2020 NOT REPORTED   Final    WBC 06/23/2020 11.3* 3.5 - 11.0 k/uL Final    RBC 06/23/2020 4.77  4.0 - 5.2 m/uL Final    Hemoglobin 06/23/2020 11.4* 12.0 - 16.0 g/dL Final    Hematocrit 06/23/2020 35.8* 36 - 46 % Final    MCV 06/23/2020 75.1* 80 - 100 fL Final    MCH 06/23/2020 23.9* 26 - 34 pg Final    MCHC 06/23/2020 31.8  31 - 37 g/dL Final    RDW 06/23/2020 14.3  11.5 - 14.9 % Final    Platelets 61/33/7305 374  150 - 450 k/uL Final    MPV 06/23/2020 8.8  6.0 - 12.0 fL Final    NRBC Automated 06/23/2020 NOT REPORTED  per 100 WBC Final    Differential Type 06/23/2020 NOT REPORTED   Final    Immature Granulocytes 06/23/2020 NOT REPORTED  0 % Final    Absolute Immature Granulocyte 06/23/2020 NOT REPORTED  0.00 - 0.30 k/uL Final    WBC Morphology 06/23/2020 NOT REPORTED   Final    RBC Morphology 06/23/2020 NOT REPORTED   Final    Platelet Estimate 89/27/9418 NOT REPORTED   Final    Seg Neutrophils 06/23/2020 75* 36 - 66 % Final    Lymphocytes 06/23/2020 17* 24 - 44 % Final    Monocytes 06/23/2020 7  1 - 7 % Final    Eosinophils % 06/23/2020 1 0 - 4 % Final    Basophils 06/23/2020 0  0 - 2 % Final    Segs Absolute 06/23/2020 8.40  1.3 - 9.1 k/uL Final    Absolute Lymph # 06/23/2020 1.90  1.0 - 4.8 k/uL Final    Absolute Mono # 06/23/2020 0.80  0.1 - 1.3 k/uL Final    Absolute Eos # 06/23/2020 0.10  0.0 - 0.4 k/uL Final    Basophils Absolute 06/23/2020 0.00  0.0 - 0.2 k/uL Final    Hemoglobin A1C 06/23/2020 5.8  4.0 - 6.0 % Final    Estimated Avg Glucose 06/23/2020 120  mg/dL Final    Comment: The ADA and AACC recommend providing the estimated average glucose result to permit better   patient understanding of their HBA1c result.  Glucose 06/23/2020 93  70 - 99 mg/dL Final    - 06/23/2020        Final    WBC, UA 06/23/2020 0 TO 2  /HPF Final    RBC, UA 06/23/2020 0 TO 2  /HPF Final    Casts UA 06/23/2020 NOT REPORTED  /LPF Final    Crystals, UA 06/23/2020 NOT REPORTED  None /HPF Final    Epithelial Cells UA 06/23/2020 20 TO 50  /HPF Final    Renal Epithelial, UA 06/23/2020 NOT REPORTED  0 /HPF Final    Bacteria, UA 06/23/2020 MODERATE* None Final    Mucus, UA 06/23/2020 2+* None Final    Trichomonas, UA 06/23/2020 NOT REPORTED  None Final    Amorphous, UA 06/23/2020 NOT REPORTED  None Final    Other Observations UA 06/23/2020 NOT REPORTED  NOT REQ. Final    Yeast, UA 06/23/2020 NOT REPORTED  None Final   Hospital Outpatient Visit on 06/17/2020   Component Date Value Ref Range Status    Specimen Description 06/17/2020 . VAGINA   Final    Special Requests 06/17/2020 NOT REPORTED   Final    Culture 06/17/2020 STREPTOCOCCI, BETA HEMOLYTIC GROUP B ISOLATED. *  Final   ]    /72   Pulse 83   Temp 98.3 °F (36.8 °C) (Oral)   Resp 18   Ht 5' 5\" (1.651 m)   Wt 235 lb (106.6 kg)   LMP 10/07/2019   BMI 39.11 kg/m²       Pelvic Exam:  Cervix Check:     DILATION:  2 cm   EFFACEMENT:   50%   STATION:  -1 cm   CONSISTENCY:  medium   POSITION:  mid    FETAL POSITION: Cephalic    Assessment:  1.  Zafar Ford is a 36 y.o. female [id number] 38w5d     2.   OB History    Para Term  AB Living   5 2 2 0 2 2   SAB TAB Ectopic Molar Multiple Live Births   0 2 0   0        # Outcome Date GA Lbr Henrik/2nd Weight Sex Delivery Anes PTL Lv   5 Current            4 Term 02/15/09 39w0d 07:00 7 lb 12 oz (3.515 kg) F Vag-Spont None     3 Term 06 40w0d 07:00 8 lb 5 oz (3.771 kg) F Vag-Forceps None     2 TAB            1 TAB                  3. HRP (high risk pregnancy), third trimester [O09.93]      4. Patient Active Problem List    Diagnosis Date Noted    LGA (large for gestational age) fetus affecting management of mother 2020     Priority: High    HRP (high risk pregnancy), unspecified trimester 2020     Priority: High    GBS bacteriuria 2020     Priority: High     2020 treated with Ampicillin      Hx of forceps delivery in prior pregnancy, currently pregnant 2019     Priority: High    HRP (high risk pregnancy), third trimester 2020    Chlamydia trachomatis infection in pregnancy on 20 (needs COLLEEN) 2020     Azithromycin 1g PO x1 sent to pharmacy  2020 + chlamydia culture- needs test of cure in 2-3 weeks      37 weeks gestation of pregnancy 2020    Elevated 1hr GTT, normal 3hr GTT 2020    GBS (group B Streptococcus carrier), +RV culture, currently pregnant 2020 treat in labor per protocol      Plantar fascial fibromatosis 2020    High-risk pregnancy 2020    Body mass index (bmi) 36.0-36.9, adult 2020 Early 1 hour GTT  2020 advise fetal kick counts instead of NSTs from 32 weeks per [facility] Guidance for COVID-19.         Obesity with body mass index 30 or greater 2020    Elective  X 2 04/15/2019    History of alcohol abuse 2019    History of cholecystectomy 2019    Epigastric pain 2019    Hx of pancreatitis 2017    Alcohol abuse 2017    Tinea corporis 2017    ASCUS with positive high risk HPV cervical 2016    Atypical squamous cell changes of undetermined significance (ASCUS) on cervical cytology with positive high risk human papilloma virus (HPV) 2016    Cervical atypism 2016    HPV in female 2016    Microscopic hematuria 2014    Disorder of lung 2014    Cholelithiasis and cholecystitis without obstruction 2014    Migraine without aura 2013    Headache 2013    Depression/Anxiety 2013    Overweight 2013       5. Contractions with minimal cervical change  6. Possible SROM with +nitrazine - ferning. 7. Decreased AFV on sono. Pt admits to minimal po water intake. 8. Suspected LGA status. Pt declined attempted vaginal delivery secondary to previous forceps with 8#5oz baby. Pt was counseled on risks/ benefits/ alternative options including risk of shoulder dystocia. Pt wishes to proceed with primary  after 8 hours if in labor and ruptured secondary to decreased fluid. 9. +GBS        Plan:   1. Continue with current care plan  2. Pt wishes to have IV fluids and wait to see if cervix changes and further leaking.    3. Spoke with anesthesia recommend if not an emergency waiting until 8 hours after eating for spinal anesthetic instead of general. Pt desires to wait because she does not want a general if she does not need to.   4. Will give GBS prophylaxis    Electronically signed by Vilma Hemphill DO on 2020 at 12:32 AM

## 2020-07-04 NOTE — OP NOTE
[facility]  OBSTETRICAL  PHYSICIAN POST-OPERATIVE  NOTE:      Patient Name: April Welsh  Patient : 1985  Room/Bed: 8518/2706-16  Admission Date/Time: 7/3/2020 11:24 PM  Primary Care Physician: Mitzi Hamilton M.D., MD  MRN #: [id number]  CSN #: [id number]        Date: 2020  Time: 8:18 AM        Pre-operative Diagnosis:   April Welsh is a 36 y.o. female at 44w7d [id number]     Term pregnancy, Spontaneous labor, Single fetus and Pregnancy complicated by: see problem list  Patient Active Problem List    Diagnosis Date Noted    LGA (large for gestational age) fetus affecting management of mother 2020     Priority: High    Hx of forceps delivery in prior pregnancy, currently pregnant 2019     Priority: High    Rh+/RI/GBS bacteruria 2020    Chlamydia trachomatis infection in pregnancy on 20 (needs COLLEEN) 2020     Overview Note:     Azithromycin 1g PO x1 sent to pharmacy  2020 + chlamydia culture- needs test of cure in 2-3 weeks      Elevated 1hr GTT, normal 3hr GTT 2020    GBS (group B Streptococcus carrier), +RV culture, currently pregnant 2020     Overview Note:     2020 treat in labor per protocol      Plantar fascial fibromatosis 2020    Body mass index (bmi) 36.0-36.9, adult 2020     Overview Note:     2020 Early 1 hour GTT  2020 advise fetal kick counts instead of NSTs from 32 weeks per [facility] Guidance for COVID-19.         Elective  X 2 04/15/2019    History of alcohol abuse 2019    History of cholecystectomy 2019    Epigastric pain 2019    Hx of pancreatitis 2017    Alcohol abuse 2017    Tinea corporis 2017    ASCUS with positive high risk HPV cervical 2016    Atypical squamous cell changes of undetermined significance (ASCUS) on cervical cytology with positive high risk human papilloma virus (HPV) 2016    Cervical Document Additional Attempt       Document Additional Attempt             Shoulder Dystocia    Shoulder dystocia present?:  No  Add Second Maneuver  Add Third Maneuver  Add Fourth Maneuver  Add Fifth Maneuver  Add Sixth Maneuver  Add Seventh Maneuver  Add Eighth Maneuver  Add Ninth Maneuver      Presentation    Presentation:  Vertex  _:  Occiput  _:  Posterior      Information    Head delivery date/time:  2020 07:37:00   Changing the 's delivery date/time could affect patient care.:     Delivery date/time:   20 0737   Delivery type:  , Low Transverse    Details:   Trial of labor?:  No    categorization:  Primary    priority:  Non-scheduled   Indications for :  Macrosomia, Other   Skin Incision Type:  Pfannenstiel   Uterine Incision:  Low Transverse         Delivery Providers    Delivering clinician:  Moon Leong,    Provider Role    Moon Leong, DO Obstetrician    Ev Hernandez, DO Resident    Bessy Piek, RN Delivery Nurse    Heidi Guzmán OB Tech    Jaime Hart MD Anesthesiologist    Nathalie Hirsch, RN Certified Registered Nurse    Jacqueline Calero, P Respiratory Therapist (Day)      Cord    Vessels:  3 Vessels  Complications:  None  Delayed cord clamping?:  Yes  Cord clamped date/time:  2020 0738  Gases sent?:  No  Cord comments:  unable to obtain   Stem cell collection (by provider):   No     Placenta    Date/time:  2020 07:40:00  Removal:  Spontaneous  Appearance:  Intact  Disposition:  Lab, Pathology     Delivery Resuscitation    Method:  Bulb Suction, Stimulation     Apgars    Living status:  Living  Apgars   1 Minute:   5 Minute:   10 Minute 15 Minute 20 Minute   Skin Color: 0  1       Heart Rate: 2  2       Reflex Irritability: 2  2       Muscle Tone: 2  2       Respiratory Effort: 2  2       Total: 8  9               Apgars Assigned By:  Yuriy HOLLY      Measurements       Delivery Information    Surgical or additional est. blood loss (mL):  0 (View Only):  Edit in Flowsheets   Combined est. blood loss (mL):  0     Other Procedures    Procedures:  None              Living  infant(s) and Female    Cephalic  occiput posterior  Other:       Amniotic Fluid was: Meconium  A Nuchal Cord: was not present x 0  A Spontaneous Cry Was Noted: Yes  The Baby: was suctioned        The Placenta Was Removed:  intact, whole and that the umbilical cord had three vessels noted    cord gasses were obtained and sent to the lab, cord blood was obtained and sent to the lab and Pitocin, 20 milliunits in 1 liter of ringers lactate was administered, wide open, to assist with uterine contraction    The umbilical cord had delayed clamping of 1 minute: Yes    The Maternal Adnexa was Visualized. There were not any adnexal masses. Body mass index is 39.11 kg/m². (Wound Vac indicated for BMI Value>35)    Wound Vac Applied to Incision: Yes      Specimen:  Placenta sent to pathology yes  * No specimens in log *     Condition:  infant stable to general nursery and mother stable    Blood Type and Rh: B POSITIVE        Rubella Immunity Status:    Rubella Antibody, IGG   Date Value Ref Range Status   2020 43.2 IU/mL Final     Comment:                 REFERENCE RANGE:  <5.0       NON-REACTIVE (non-immune)  5.0 TO 9.9 EQUIVOCAL  >=10.0     REACTIVE     (immune)                 Infant Feeding:    breast      All Sponge Counts Were Correct x 3 calls-Prior to closure of Peritoneum, Fascia, and Skin Layers: Yes        Attending Attestation: I was present and scrubbed for the entire procedure.     SCIP will be utilized for Antibiotics: ancef  Allergies as of 2020 - Review Complete 2020   Allergen Reaction Noted    No known allergies      Seasonal  2013         EPC's in  Place for DVT prophylaxis      Procedure: (Understanding of limitations from template op-reports exist)  Lucía Erwin is a 36 y.o. female [id number] @ 38w5d for  delivery. The risks, benefits, complications, alternative treatment options, and expected outcomes were discussed with the patient. Risks of surgery were discussed, including but not limited to: pain, bleeding, need for blood transfusion, infection, injury to internal organs including intestines, bladder, uterus, fallopian tubes, ovaries and rarely injury to the fetus. Postoperative complications including pain, bleeding, need for blood transfusion, infection, re-operation, infection of the incisions were also explained. The patient verbalized understanding and agreed to proceed, giving informed consent. The patient was taken to Operating Room, identified as Ally Proctor and the procedure verified as  Delivery. A Time Out was held and the above information confirmed. Procedure Details:  After Spinal Anesthetic with Duramorph was instilled in the seated position the patient was returned to the supine position with a wedge placed under her right hip. FHT's were obtained, the patient was prepped and draped in the usual sterile manner. A three minute drape delay was completed. The bladder was draining clear urine. SCIP antibiotics were infused, EPC's were in place and operating. A time out was completed. There was an adequate skin check both high and low. A Pfannenstiel incision was made with a #10 scalpel and carried down to the fascia with bovie cautery. Fascial incision was made and extended transversely. The fascia was  from the underlying rectus tissue superiorly and inferiorly. The peritoneum was identified and entered superiorly. The peritoneal incision was extended superiorly and inferiorly, care not to involve the bowel or the bladder. The Shanna CARMEN retractor was placed inferiorly into the incision. The vesico-uterine reflection was developed and skeletonized off the lower uterine segment with blunt and sharp dissection.  The [facility] retractor was reapproximated. A low transverse uterine incision was made and the uterine cavity was entered with extreme caution with the blunt end of the scalpel. This incision was extended using digital dissection in a cephalad to caudad manner. A live female infant was delivered without complications. The head was delivered through the incision and fundal pressure was used for the remaining body of the . There was not a nuchal cord. The  had bulb suction of the mouth and nares. There was a spontaneous cry. The infant was vigorous  and there was delayed cord clamping of 1 minute. Please find the  Apgar scores and weight above in the delivery summary. The umbilical cord was then clamped and cut, the infant was handed off to the awaiting neonatology team staff member attending the delivery. Then cord specimen and cord blood was collected and the placenta was delivered using gentle traction and fundal massage, (Spontaneously), it was intact and appeared normal.  The uterus was cleared of all clots and debris and was firm and contracted. IV Pitocin was infusing. An outflow tract was confirmed. The uterine incision was closed with running locked sutures of 0 Vicryl, starting at each corner with figure of \"8's\" and moving to the midline. Excellent hemostasis was observed. Gutters were cleared of all clots and debris. The pelvis was irrigated with warm, sterile water. Uterine incision was reinspected and hemostatic. The uterus, bilateral tubes and ovaries were normal.   The peritoneum was closed with 2-0 vicryl. The fascia was then reapproximated with running sutures of 0 Vicryl, starting at each corner and moving to the midline. It was checked for any defects and there were none. The subcutaneous tissue was irrigated, any bleeding sites were cauterized. The subcuticular space was not infiltrated with 0.5% Plain marcaine to limit breakthrough post operative pain.   The skin was closed in a subcuticular fashion with 4-0 vicryl, then covered with tincture of benzoin and steri-strips,  It was dressed with HERRERA wound vac  Sponge, Needle and instrument counts were called for and found to be correct prior to closure of the peritoneum, fascia, and skin layers. The urine was clear in the tubing and the bag at the end of the procedure. The patient received preoperative antibiotics and intraoperative analgesic. EPC's were in place at the beginning of the case. Patient was returned to her LDRP in stable condition. See orders.               Attending's Name: Keith Frank DO      Physician Completing Document: Keith Frank DO    Electronically signed by Keith Frank DO on 7/4/2020 at 8:18 AM

## 2020-07-04 NOTE — PROGRESS NOTES
Update History & Physical- (Obstetrics)      Patient Name: Salbador Patel  Patient : 1985  Room/Bed: 2360/1134-43  Admission Date/Time: 7/3/2020 11:24 PM  Primary Care Physician: Hay Bledsoe M.D., MD  MRN #: [id number]  [facility] #: [id number]        Date: 2020  Time: 6:42 AM      The patient's History and Physical was reviewed with the patient and there were no significant changes. I examined the patient and there were no significant changes from the previous History and Physical. Pt started more leakage at 0300 per nursing. I was just notified now of continued leaking by nursing. Pt states contractions remain unchanged. Pt requesting primary  at this time. Pt was counseled on risks/ benefits/ alternative options. Plan: The risk, benefits, expected outcome, and alternative to the recommended procedure have been discussed with the patient. Patient understands and wants to proceed with the procedure. She is aware that there may be a need for a second procedure and there may be incomplete removal of any abnormal tissue. She was also counseled on the possibility of Bleeding, Infection, possible damage to bowel, bladder, baby, vasculature and surrounding organs. The labs and consent were reviewed prior to the patient going to the Operating Room.      bpm Baseline  Variability- moderate   Category Tracing- 1          Vitals:    20 2334 20 0245 20 0626   BP: 127/72 123/64 105/63   Pulse: 83 73 69   Resp: 18 16 14   Temp: 98.3 °F (36.8 °C) 96.6 °F (35.9 °C) 97.2 °F (36.2 °C)   TempSrc: Oral Infrared Infrared   Weight: 235 lb (106.6 kg)     Height: 5' 5\" (1.651 m)       PE-per resident note-Unchanged    Patient Active Problem List    Diagnosis Date Noted    LGA (large for gestational age) fetus affecting management of mother 2020     Priority: High    Hx of forceps delivery in prior pregnancy, currently pregnant 2019     Priority: High    Rh+/RI/GBS bacteruria 2020    Chlamydia trachomatis infection in pregnancy on 20 (needs COLLEEN) 2020     Overview Note:     Azithromycin 1g PO x1 sent to pharmacy  2020 + chlamydia culture- needs test of cure in 2-3 weeks      Elevated 1hr GTT, normal 3hr GTT 2020    GBS (group B Streptococcus carrier), +RV culture, currently pregnant 2020     Overview Note:     2020 treat in labor per protocol      Plantar fascial fibromatosis 2020    Body mass index (bmi) 36.0-36.9, adult 2020     Overview Note:     2020 Early 1 hour GTT  2020 advise fetal kick counts instead of NSTs from 32 weeks per [facility] Guidance for COVID-19.         Elective  X 2 04/15/2019    History of alcohol abuse 2019    History of cholecystectomy 2019    Epigastric pain 2019    Hx of pancreatitis 2017    Alcohol abuse 2017    Tinea corporis 2017    ASCUS with positive high risk HPV cervical 2016    Atypical squamous cell changes of undetermined significance (ASCUS) on cervical cytology with positive high risk human papilloma virus (HPV) 2016    Cervical atypism 2016    HPV in female 2016    Microscopic hematuria 2014    Disorder of lung 2014    Cholelithiasis and cholecystitis without obstruction 2014    Migraine without aura 2013    Headache 2013    Depression/Anxiety 2013    Overweight 2013         Lab Results:  Admission on 2020   Component Date Value Ref Range Status    Expiration Date 2020,2359   Final    Arm Band Number 2020 [id number]   Final    ABO/Rh 2020 B POSITIVE   Final    Antibody Screen 2020 NEGATIVE   Final    WBC 2020 11.8* 3.5 - 11.0 k/uL Final    RBC 2020 4.38  4.0 - 5.2 m/uL Final    Hemoglobin 2020 10.7* 12.0 - 16.0 g/dL Final    Hematocrit 2020 32.8* 36 - 46 % Final    MCV 07/04/2020 74.7* 80 - 100 fL Final    MCH 07/04/2020 24.4* 26 - 34 pg Final    MCHC 07/04/2020 32.6  31 - 37 g/dL Final    RDW 07/04/2020 14.7  11.5 - 14.9 % Final    Platelets 73/13/3091 411  150 - 450 k/uL Final    MPV 07/04/2020 8.8  6.0 - 12.0 fL Final    NRBC Automated 07/04/2020 NOT REPORTED  per 100 WBC Final    Protime 07/04/2020 12.5  11.8 - 14.6 sec Final    INR 07/04/2020 1.0   Final    Comment:       Non-therapeutic Range:     INR = 0.9-1.2  Therapeutic Range:    Moderate Anticoagulant Intensity:     INR = 2.0-3.0   High Anticoagulant Intensity:     INR = 2.5-3.5            PTT 07/04/2020 29.3  24.0 - 36.0 sec Final    Comment:       IV Heparin Therapy Range:      62.0-94.0            Fibrinogen 07/04/2020 451  210 - 530 mg/dL Final    Amphetamine Screen, Ur 07/04/2020 NEGATIVE  NEGATIVE Final    Comment:       (Positive cutoff 1000 ng/mL)                  Barbiturate Screen, Ur 07/04/2020 NEGATIVE  NEGATIVE Final    Comment:       (Positive cutoff 200 ng/mL)                  Benzodiazepine Screen, Urine 07/04/2020 NEGATIVE  NEGATIVE Final    Comment:       (Positive cutoff 200 ng/mL)                  Cocaine Metabolite, Urine 07/04/2020 NEGATIVE  NEGATIVE Final    Comment:       (Positive cutoff 300 ng/mL)                  Methadone Screen, Urine 07/04/2020 NEGATIVE  NEGATIVE Final    Comment:       (Positive cutoff 300 ng/mL)                  Opiates, Urine 07/04/2020 NEGATIVE  NEGATIVE Final    Comment:       (Positive cutoff 300 ng/mL)                  Phencyclidine, Urine 07/04/2020 NEGATIVE  NEGATIVE Final    Comment:       (Positive cutoff 25 ng/mL)                  Propoxyphene, Urine 07/04/2020 NOT REPORTED  NEGATIVE Final    Cannabinoid Scrn, Ur 07/04/2020 NEGATIVE  NEGATIVE Final    Comment:       (Positive cutoff 50 ng/mL)                  Oxycodone Screen, Ur 07/04/2020 NEGATIVE  NEGATIVE Final    Comment:       (Positive cutoff 100 ng/mL)                  Methamphetamine, Urine 07/04/2020 NOT REPORTED  NEGATIVE Final    Tricyclic Antidepressants, Urine 07/04/2020 NOT REPORTED  NEGATIVE Final    MDMA, Urine 07/04/2020 NOT REPORTED  NEGATIVE Final    Buprenorphine Urine 07/04/2020 NOT REPORTED  NEGATIVE Final    Test Information 07/04/2020 Assay provides medical screening only. The absence of expected drug(s) and/or metabolite(s) may indicate diluted or adulterated urine, limitations of testing or timing of collection. Final    Comment: Testing for legal purposes should be confirmed by another method. To request confirmation   of test result, please call the lab within 7 days of sample submission.       Color, UA 07/04/2020 DARK YELLOW* YELLOW Final    Turbidity UA 07/04/2020 TURBID* CLEAR Final    Glucose, Ur 07/04/2020 NEGATIVE  NEGATIVE Final    Bilirubin Urine 07/04/2020 NEGATIVE  NEGATIVE Final    Ketones, Urine 07/04/2020 NEGATIVE  NEGATIVE Final    Specific Gravity, UA 07/04/2020 1.025  1.000 - 1.030 Final    Urine Hgb 07/04/2020 NEGATIVE  NEGATIVE Final    pH, UA 07/04/2020 6.0  5.0 - 8.0 Final    Protein, UA 07/04/2020 TRACE* NEGATIVE Final    Urobilinogen, Urine 07/04/2020 Normal  Normal Final    Nitrite, Urine 07/04/2020 NEGATIVE  NEGATIVE Final    Leukocyte Esterase, Urine 07/04/2020 MOD* NEGATIVE Final    Urinalysis Comments 07/04/2020 NOT REPORTED   Final    - 07/04/2020        Final    WBC, UA 07/04/2020 10 TO 20  /HPF Final    RBC, UA 07/04/2020 0 TO 2  /HPF Final    Casts UA 07/04/2020 NOT REPORTED  /LPF Final    Crystals, UA 07/04/2020 CALCIUM OXALATE* None /HPF Final    Crystals, UA 07/04/2020 MODERATE* None /HPF Final    Epithelial Cells UA 07/04/2020 10 TO 20  /HPF Final    Renal Epithelial, UA 07/04/2020 NOT REPORTED  0 /HPF Final    Bacteria, UA 07/04/2020 MODERATE* None Final    Mucus, UA 07/04/2020 NOT REPORTED  None Final    Trichomonas, UA 07/04/2020 NOT REPORTED  None Final    Amorphous, UA 07/04/2020 NOT REPORTED  None Final    Other Observations UA 07/04/2020 NOT REPORTED  NOT REQ. Final    Yeast, UA 07/04/2020 NOT REPORTED  None Final   Hospital Outpatient Visit on 07/02/2020   Component Date Value Ref Range Status    SARS-CoV-2 07/02/2020 Not Detected  Not Detected Final    Comment:       The specimen is NEGATIVE for SARS-CoV-2, the novel coronavirus associated with COVID-19. A negative result does not rule out COVID-19. This test has been authorized by the FDA under an Emergency Use Authorization (EUA) for use   by authorized laboratories. Fact sheet for Healthcare Providers: BuildHer.es  Fact sheet for Patients: BuildHer.es        METHODOLOGY: RT-PCR      SARS-CoV-2, Rapid 07/02/2020        Final    Source 07/02/2020 . NASOPHARYNGEAL SWAB   Final    SARS-CoV-2, PCR 07/02/2020        Final   Hospital Outpatient Visit on 06/25/2020   Component Date Value Ref Range Status    SARS-CoV-2, LIU 06/25/2020 Not Detected  Not Detected Final    Comment: (NOTE)  This test was developed and its performance characteristics  determined by [employer]. This test has not been FDA  cleared or approved. This test has been authorized by FDA under an  Emergency Use Authorization (EUA). This test is only authorized for  the duration of time the declaration that circumstances exist  justifying the authorization of the emergency use of in vitro  diagnostic tests for detection of SARS-CoV-2 virus and/or diagnosis  of COVID-19 infection under section 564(b)(1) of the Act, 21 U. S.C.  560MUI-4(R)(3), unless the authorization is terminated or revoked  sooner. When diagnostic testing is negative, the possibility of a  false negative result should be considered in the context of a  patient's recent exposures and the presence of clinical signs and  symptoms consistent with COVID-19.  An individual without symptoms of  COVID-19 and who is not shedding SARS-CoV-2 virus would expect to  have a negative (not detected) result in this assay. Performed                            At: [address] [id number]  Chema Barcenas MD ZI:[id number]     Admission on 06/25/2020, Discharged on 06/25/2020   Component Date Value Ref Range Status    Color, UA 06/25/2020 DARK YELLOW* YELLOW Final    Turbidity UA 06/25/2020 CLEAR  CLEAR Final    Glucose, Ur 06/25/2020 NEGATIVE  NEGATIVE Final    Bilirubin Urine 06/25/2020 NEGATIVE  NEGATIVE Final    Ketones, Urine 06/25/2020 TRACE* NEGATIVE Final    Specific [place], UA 06/25/2020 1.020  1.000 - 1.030 Final    Urine Hgb 06/25/2020 NEGATIVE  NEGATIVE Final    pH, UA 06/25/2020 7.0  5.0 - 8.0 Final    Protein, UA 06/25/2020 NEGATIVE  NEGATIVE Final    Urobilinogen, Urine 06/25/2020 Normal  Normal Final    Nitrite, Urine 06/25/2020 NEGATIVE  NEGATIVE Final    Leukocyte Esterase, Urine 06/25/2020 SMALL* NEGATIVE Final    Urinalysis Comments 06/25/2020 NOT REPORTED   Final    - 06/25/2020        Final    WBC, UA 06/25/2020 5 TO 10  /HPF Final    RBC, UA 06/25/2020 2 TO 5  /HPF Final    Casts UA 06/25/2020 NOT REPORTED  /LPF Final    Crystals, UA 06/25/2020 NOT REPORTED  None /HPF Final    Epithelial Cells UA 06/25/2020 10 TO 20  /HPF Final    Renal Epithelial, UA 06/25/2020 NOT REPORTED  0 /HPF Final    Bacteria, UA 06/25/2020 FEW* None Final    Mucus, UA 06/25/2020 NOT REPORTED  None Final    Trichomonas, UA 06/25/2020 NOT REPORTED  None Final    Amorphous, UA 06/25/2020 NOT REPORTED  None Final    Other Observations UA 06/25/2020 NOT REPORTED  NOT REQ. Final    Yeast, UA 06/25/2020 NOT REPORTED  None Final    Specimen Description 06/25/2020 . CLEAN CATCH URINE   Final    Special Requests 06/25/2020 NOT REPORTED   Final    Culture 06/25/2020 UROGENITAL KYE PRESENT   Final    Culture 06/25/2020 STREPTOCOCCI, BETA HEMOLYTIC GROUP B <[id number] CFU/ML Group B strep is identified at any colony count in a female who could potentially be pregnant based on age only. Group B strep isolated in urine at any colony count is considered a surrogate for vaginal rectal colonization in the context of determining maxine-emy therapy. Treatment for UTI or asymptomatic bacteruria should be based on colony count and clinical symptoms and not on the presence of the organism alone. *  Final    Specimen Description 2020 . VAGINA   Final    Special Requests 2020 NOT REPORTED   Final    Direct Exam 2020 POSITIVE for Gardnerella vaginalis. *  Final    Direct Exam 2020 NEGATIVE for Trichomonas vaginalis   Final    Direct Exam 2020 NEGATIVE for Candida sp. Final    Direct Exam 2020 Method of testing is a DNA probe intended for detection and identification of Candida species, Gardnerella vaginalis, and Trichomonas vaginalis nucleic acid in vaginal fluid specimens from patients with symptoms of vaginitis/vaginosis. Final    Specimen Description 2020 . CERVIX   Final    C. trachomatis DNA 2020 POSITIVE: CHLAMYDIA TRACHOMATIS DNA detected by nucleic acid amplification. * NEGATIVE Final    Comment: This test is intended for medical purposes only and is not valid for the evaluation of   suspected sexual abuse or for other forensic purposes. In certain contexts, culture may be required to meet applicable laws and regulations for   diagnosis of C. trachomatis and N. gonorrhoeae infections. Per 2014  CDC recommendations, this test does not include confirmation of positive results   by an alternative nucleic acid target. Results reported to the appropriate Health Department      N. gonorrhoeae DNA 2020 NEGATIVE  NEGATIVE Final    Comment: NEISSERIA GONORRHOEAE DNA not detected by nucleic acid amplification.         This test is intended for medical purposes only and is not valid for the evaluation of   suspected sexual abuse or for other forensic purposes. In certain contexts, culture may be required to meet applicable laws and regulations for   diagnosis of C. trachomatis and N. gonorrhoeae infections. Per 2014  CDC recommendations, this test does not include confirmation of positive results   by an alternative nucleic acid target.  Amount Glucose Given 06/25/2020 100  g Final    Glucose, Fasting 06/25/2020 84  65 - 94 mg/dL Final    Glucose, GTT - 1 Hour 06/25/2020 174  65 - 179 mg/dL Final    Glucose, GTT - 2 Hour 06/25/2020 135  65 - 154 mg/dL Final    3 Hr Glucose 06/25/2020 128  65 - 139 mg/dL Final   Hospital Outpatient Visit on 06/23/2020   Component Date Value Ref Range Status    Color, UA 06/23/2020 DARK YELLOW* YELLOW Final    Turbidity UA 06/23/2020 TURBID* CLEAR Final    Glucose, Ur 06/23/2020 NEGATIVE  NEGATIVE Final    Bilirubin Urine 06/23/2020 Presumptive positive. Unable to confirm due to unavailability of reagent. * NEGATIVE Corrected    CORRECTED ON 06/23 AT 1307: PREVIOUSLY REPORTED AS SMALL    Ketones, Urine 06/23/2020 NEGATIVE  NEGATIVE Final    Specific [place], UA 06/23/2020 1.021  1.000 - 1.030 Final    Urine Hgb 06/23/2020 NEGATIVE  NEGATIVE Final    pH, UA 06/23/2020 6.5  5.0 - 8.0 Final    Protein, UA 06/23/2020 TRACE* NEGATIVE Final    Urobilinogen, Urine 06/23/2020 Normal  Normal Final    Nitrite, Urine 06/23/2020 NEGATIVE  NEGATIVE Final    Leukocyte Esterase, Urine 06/23/2020 LARGE* NEGATIVE Final    Urinalysis Comments 06/23/2020 NOT REPORTED   Final    WBC 06/23/2020 11.3* 3.5 - 11.0 k/uL Final    RBC 06/23/2020 4.77  4.0 - 5.2 m/uL Final    Hemoglobin 06/23/2020 11.4* 12.0 - 16.0 g/dL Final    Hematocrit 06/23/2020 35.8* 36 - 46 % Final    MCV 06/23/2020 75.1* 80 - 100 fL Final    MCH 06/23/2020 23.9* 26 - 34 pg Final    MCHC 06/23/2020 31.8  31 - 37 g/dL Final    RDW 06/23/2020 14.3  11.5 - 14.9 % Final    Platelets 31/91/2803 374  150 - 450 k/uL Final    MPV 06/23/2020 8.8  6.0 - 12.0 fL Final    NRBC Automated 06/23/2020 NOT REPORTED  per 100 WBC Final    Differential Type 06/23/2020 NOT REPORTED   Final    Immature Granulocytes 06/23/2020 NOT REPORTED  0 % Final    Absolute Immature Granulocyte 06/23/2020 NOT REPORTED  0.00 - 0.30 k/uL Final    WBC Morphology 06/23/2020 NOT REPORTED   Final    RBC Morphology 06/23/2020 NOT REPORTED   Final    Platelet Estimate 58/40/7976 NOT REPORTED   Final    Seg Neutrophils 06/23/2020 75* 36 - 66 % Final    Lymphocytes 06/23/2020 17* 24 - 44 % Final    Monocytes 06/23/2020 7  1 - 7 % Final    Eosinophils % 06/23/2020 1  0 - 4 % Final    Basophils 06/23/2020 0  0 - 2 % Final    Segs Absolute 06/23/2020 8.40  1.3 - 9.1 k/uL Final    Absolute Lymph # 06/23/2020 1.90  1.0 - 4.8 k/uL Final    Absolute Mono # 06/23/2020 0.80  0.1 - 1.3 k/uL Final    Absolute Eos # 06/23/2020 0.10  0.0 - 0.4 k/uL Final    Basophils Absolute 06/23/2020 0.00  0.0 - 0.2 k/uL Final    Hemoglobin A1C 06/23/2020 5.8  4.0 - 6.0 % Final    Estimated Avg Glucose 06/23/2020 120  mg/dL Final    Comment: The ADA and AACC recommend providing the estimated average glucose result to permit better   patient understanding of their HBA1c result.  Glucose 06/23/2020 93  70 - 99 mg/dL Final    - 06/23/2020        Final    WBC, UA 06/23/2020 0 TO 2  /HPF Final    RBC, UA 06/23/2020 0 TO 2  /HPF Final    Casts UA 06/23/2020 NOT REPORTED  /LPF Final    Crystals, UA 06/23/2020 NOT REPORTED  None /HPF Final    Epithelial Cells UA 06/23/2020 20 TO 50  /HPF Final    Renal Epithelial, UA 06/23/2020 NOT REPORTED  0 /HPF Final    Bacteria, UA 06/23/2020 MODERATE* None Final    Mucus, UA 06/23/2020 2+* None Final    Trichomonas, UA 06/23/2020 NOT REPORTED  None Final    Amorphous, UA 06/23/2020 NOT REPORTED  None Final    Other Observations UA 06/23/2020 NOT REPORTED  NOT REQ.  Final    Yeast, UA 06/23/2020 NOT REPORTED  None Final   Hospital Outpatient Visit on 2020   Component Date Value Ref Range Status    Specimen Description 2020 . VAGINA   Final    Special Requests 2020 NOT REPORTED   Final    Culture 2020 STREPTOCOCCI, BETA HEMOLYTIC GROUP B ISOLATED. *  Final   ]    Assessment:  1. Jenni Miller is a 36 y.o. female  2. IUP @ 38w5d  3. Suspected LGA with h/o forceps with with 1st baby of 8#5oz  4. SROM  5. +GBS  Patient Active Problem List    Diagnosis Date Noted    LGA (large for gestational age) fetus affecting management of mother 2020     Priority: High    Hx of forceps delivery in prior pregnancy, currently pregnant 2019     Priority: High    Rh+/RI/GBS bacteruria 2020    Chlamydia trachomatis infection in pregnancy on 20 (needs COLLEEN) 2020     Overview Note:     Azithromycin 1g PO x1 sent to pharmacy  2020 + chlamydia culture- needs test of cure in 2-3 weeks      Elevated 1hr GTT, normal 3hr GTT 2020    GBS (group B Streptococcus carrier), +RV culture, currently pregnant 2020     Overview Note:     2020 treat in labor per protocol      Plantar fascial fibromatosis 2020    Body mass index (bmi) 36.0-36.9, adult 2020     Overview Note:     2020 Early 1 hour GTT  2020 advise fetal kick counts instead of NSTs from 32 weeks per [facility] Guidance for COVID-19.         Elective  X 2 04/15/2019    History of alcohol abuse 2019    History of cholecystectomy 2019    Epigastric pain 2019    Hx of pancreatitis 2017    Alcohol abuse 2017    Tinea corporis 2017    ASCUS with positive high risk HPV cervical 2016    Atypical squamous cell changes of undetermined significance (ASCUS) on cervical cytology with positive high risk human papilloma virus (HPV) 2016    Cervical atypism 2016    HPV in female 2016    Microscopic hematuria 2014    Disorder of lung 2014    Cholelithiasis and cholecystitis without obstruction 2014    Migraine without aura 2013    Headache 2013    Depression/Anxiety 2013    Overweight 2013          Plan:  1.   2. Procedure risk and complications reviewed  3. SCIP ABX ordered  4. Thromboembolic prophylaxis ordered with EPC's BL  5.  Consent Obtained      Electronically signed by Cielo Thomas DO  on 2020 at 6:42 AM

## 2020-07-04 NOTE — FLOWSHEET NOTE
Dr. Rand Shone updated on 14ml blood loss since Methergine given at 1045. Fundus firm, no clots expressed. VSS. Pitocin continued. Hemoglobin 8.4, decreased from 10.7. No new orders. Dr. Rand Shone states Dr. Lilia Medley, [facility] resident will evaluate.

## 2020-07-05 LAB
CULTURE: NORMAL
HCT VFR BLD CALC: 26.8 % (ref 36–46)
HEMOGLOBIN: 8.3 G/DL (ref 12–16)
Lab: NORMAL
SPECIMEN DESCRIPTION: NORMAL

## 2020-07-05 PROCEDURE — 99024 POSTOP FOLLOW-UP VISIT: CPT | Performed by: OBSTETRICS & GYNECOLOGY

## 2020-07-05 PROCEDURE — 85018 HEMOGLOBIN: CPT

## 2020-07-05 PROCEDURE — 6370000000 HC RX 637 (ALT 250 FOR IP): Performed by: STUDENT IN AN ORGANIZED HEALTH CARE EDUCATION/TRAINING PROGRAM

## 2020-07-05 PROCEDURE — 1220000000 HC SEMI PRIVATE OB R&B

## 2020-07-05 PROCEDURE — 36415 COLL VENOUS BLD VENIPUNCTURE: CPT

## 2020-07-05 PROCEDURE — 6360000002 HC RX W HCPCS: Performed by: STUDENT IN AN ORGANIZED HEALTH CARE EDUCATION/TRAINING PROGRAM

## 2020-07-05 PROCEDURE — 2580000003 HC RX 258: Performed by: STUDENT IN AN ORGANIZED HEALTH CARE EDUCATION/TRAINING PROGRAM

## 2020-07-05 PROCEDURE — 85014 HEMATOCRIT: CPT

## 2020-07-05 RX ADMIN — FERROUS SULFATE TAB 325 MG (65 MG ELEMENTAL FE) 325 MG: 325 (65 FE) TAB at 08:45

## 2020-07-05 RX ADMIN — METHYLERGONOVINE MALEATE 200 MCG: 0.2 TABLET ORAL at 08:46

## 2020-07-05 RX ADMIN — OXYCODONE HYDROCHLORIDE AND ACETAMINOPHEN 1 TABLET: 5; 325 TABLET ORAL at 13:44

## 2020-07-05 RX ADMIN — NAPROXEN 500 MG: 500 TABLET ORAL at 14:56

## 2020-07-05 RX ADMIN — DOCUSATE SODIUM 100 MG: 100 CAPSULE, LIQUID FILLED ORAL at 22:21

## 2020-07-05 RX ADMIN — OXYCODONE HYDROCHLORIDE AND ACETAMINOPHEN 1 TABLET: 5; 325 TABLET ORAL at 01:40

## 2020-07-05 RX ADMIN — OXYCODONE HYDROCHLORIDE AND ACETAMINOPHEN 1 TABLET: 5; 325 TABLET ORAL at 17:51

## 2020-07-05 RX ADMIN — DOCUSATE SODIUM 100 MG: 100 CAPSULE, LIQUID FILLED ORAL at 08:46

## 2020-07-05 RX ADMIN — CEFAZOLIN 1 G: 1 INJECTION, POWDER, FOR SOLUTION INTRAMUSCULAR; INTRAVENOUS at 00:08

## 2020-07-05 RX ADMIN — FERROUS SULFATE TAB 325 MG (65 MG ELEMENTAL FE) 325 MG: 325 (65 FE) TAB at 17:51

## 2020-07-05 RX ADMIN — NAPROXEN 500 MG: 500 TABLET ORAL at 02:28

## 2020-07-05 RX ADMIN — OXYCODONE HYDROCHLORIDE AND ACETAMINOPHEN 1 TABLET: 5; 325 TABLET ORAL at 05:49

## 2020-07-05 RX ADMIN — OXYCODONE HYDROCHLORIDE AND ACETAMINOPHEN 1 TABLET: 5; 325 TABLET ORAL at 22:22

## 2020-07-05 ASSESSMENT — PAIN DESCRIPTION - DESCRIPTORS: DESCRIPTORS: ACHING

## 2020-07-05 ASSESSMENT — PAIN SCALES - GENERAL
PAINLEVEL_OUTOF10: 5
PAINLEVEL_OUTOF10: 5
PAINLEVEL_OUTOF10: 4
PAINLEVEL_OUTOF10: 2
PAINLEVEL_OUTOF10: 6
PAINLEVEL_OUTOF10: 6
PAINLEVEL_OUTOF10: 8
PAINLEVEL_OUTOF10: 5

## 2020-07-05 ASSESSMENT — PAIN DESCRIPTION - ORIENTATION: ORIENTATION: MID;LOWER

## 2020-07-05 ASSESSMENT — PAIN DESCRIPTION - LOCATION: LOCATION: ABDOMEN

## 2020-07-05 NOTE — PROGRESS NOTES
Obstetrical Rounds:    POD#: 1  Hospital Day: 2  Procedure: primary  section    Date: 2020  Time: 6:35 AM        Patient Name: Chloe Hoang  Patient : 1985  Room/Bed: 3805/5423-37  Admission Date/Time: 7/3/2020 11:24 PM  MRN #: [id number]  [facility] #: [id number]        Attending Physician Statement  I have discussed the care of hCloe Hoang, including pertinent history and exam findings,  with the resident. I have reviewed their note in the electronic medical record. I have seen and examined the patient and the key elements of all parts of the encounter have been performed/reviewed by me . I agree with the assessment, plan and orders as documented by the resident. Pt without c/c. Pt ambulating & urinating without difficulty. Pt breastfeeding. Pt states her bleeding is minimal. Pt denies lightheadedness/ dizziness. If stable plan discharge tomorow    Vitals:    20 0234   BP: 113/63   Pulse: 85   Resp: 16   Temp: 99 °F (37.2 °C)   SpO2:        Admission on 2020   Component Date Value Ref Range Status    Expiration Date 2020,2359   Final    Arm Band Number 2020 [id number]   Final    ABO/Rh 2020 B POSITIVE   Final    Antibody Screen 2020 NEGATIVE   Final    WBC 2020 11.8* 3.5 - 11.0 k/uL Final    RBC 2020 4.38  4.0 - 5.2 m/uL Final    Hemoglobin 2020 10.7* 12.0 - 16.0 g/dL Final    Hematocrit 2020 32.8* 36 - 46 % Final    MCV 2020 74.7* 80 - 100 fL Final    MCH 2020 24.4* 26 - 34 pg Final    MCHC 2020 32.6  31 - 37 g/dL Final    RDW 2020 14.7  11.5 - 14.9 % Final    Platelets  411  150 - 450 k/uL Final    MPV 2020 8.8  6.0 - 12.0 fL Final    NRBC Automated 2020 NOT REPORTED  per 100 WBC Final    T. pallidum, IgG 2020 NONREACTIVE  NONREACTIVE Final    Comment:       T. pallidum antibodies are not detected.   There is no serological evidence of infection with T. pallidum (early primary syphilis   cannot be excluded). Retest in 2-4 weeks if syphilis is clinically suspect.  Protime 07/04/2020 12.5  11.8 - 14.6 sec Final    INR 07/04/2020 1.0   Final    Comment:       Non-therapeutic Range:     INR = 0.9-1.2  Therapeutic Range:    Moderate Anticoagulant Intensity:     INR = 2.0-3.0   High Anticoagulant Intensity:     INR = 2.5-3.5            PTT 07/04/2020 29.3  24.0 - 36.0 sec Final    Comment:       IV Heparin Therapy Range:      62.0-94.0            Fibrinogen 07/04/2020 451  210 - 530 mg/dL Final    Amphetamine Screen, Ur 07/04/2020 NEGATIVE  NEGATIVE Final    Comment:       (Positive cutoff 1000 ng/mL)                  Barbiturate Screen, Ur 07/04/2020 NEGATIVE  NEGATIVE Final    Comment:       (Positive cutoff 200 ng/mL)                  Benzodiazepine Screen, Urine 07/04/2020 NEGATIVE  NEGATIVE Final    Comment:       (Positive cutoff 200 ng/mL)                  Cocaine Metabolite, Urine 07/04/2020 NEGATIVE  NEGATIVE Final    Comment:       (Positive cutoff 300 ng/mL)                  Methadone Screen, Urine 07/04/2020 NEGATIVE  NEGATIVE Final    Comment:       (Positive cutoff 300 ng/mL)                  Opiates, Urine 07/04/2020 NEGATIVE  NEGATIVE Final    Comment:       (Positive cutoff 300 ng/mL)                  Phencyclidine, Urine 07/04/2020 NEGATIVE  NEGATIVE Final    Comment:       (Positive cutoff 25 ng/mL)                  Propoxyphene, Urine 07/04/2020 NOT REPORTED  NEGATIVE Final    Cannabinoid Scrn, Ur 07/04/2020 NEGATIVE  NEGATIVE Final    Comment:       (Positive cutoff 50 ng/mL)                  Oxycodone Screen, Ur 07/04/2020 NEGATIVE  NEGATIVE Final    Comment:       (Positive cutoff 100 ng/mL)                  Methamphetamine, Urine 07/04/2020 NOT REPORTED  NEGATIVE Final    Tricyclic Antidepressants, Urine 07/04/2020 NOT REPORTED  NEGATIVE Final    MDMA, Urine 07/04/2020 NOT REPORTED  NEGATIVE Final    Buprenorphine Urine 07/04/2020 NOT REPORTED  NEGATIVE Final    Test Information 07/04/2020 Assay provides medical screening only. The absence of expected drug(s) and/or metabolite(s) may indicate diluted or adulterated urine, limitations of testing or timing of collection. Final    Comment: Testing for legal purposes should be confirmed by another method. To request confirmation   of test result, please call the lab within 7 days of sample submission.  Color, UA 07/04/2020 DARK YELLOW* YELLOW Final    Turbidity UA 07/04/2020 TURBID* CLEAR Final    Glucose, Ur 07/04/2020 NEGATIVE  NEGATIVE Final    Bilirubin Urine 07/04/2020 NEGATIVE  NEGATIVE Final    Ketones, Urine 07/04/2020 NEGATIVE  NEGATIVE Final    Specific Gravity, UA 07/04/2020 1.025  1.000 - 1.030 Final    Urine Hgb 07/04/2020 NEGATIVE  NEGATIVE Final    pH, UA 07/04/2020 6.0  5.0 - 8.0 Final    Protein, UA 07/04/2020 TRACE* NEGATIVE Final    Urobilinogen, Urine 07/04/2020 Normal  Normal Final    Nitrite, Urine 07/04/2020 NEGATIVE  NEGATIVE Final    Leukocyte Esterase, Urine 07/04/2020 MOD* NEGATIVE Final    Urinalysis Comments 07/04/2020 NOT REPORTED   Final    - 07/04/2020        Final    WBC, UA 07/04/2020 10 TO 20  /HPF Final    RBC, UA 07/04/2020 0 TO 2  /HPF Final    Casts UA 07/04/2020 NOT REPORTED  /LPF Final    Crystals, UA 07/04/2020 CALCIUM OXALATE* None /HPF Final    Crystals, UA 07/04/2020 MODERATE* None /HPF Final    Epithelial Cells UA 07/04/2020 10 TO 20  /HPF Final    Renal Epithelial, UA 07/04/2020 NOT REPORTED  0 /HPF Final    Bacteria, UA 07/04/2020 MODERATE* None Final    Mucus, UA 07/04/2020 NOT REPORTED  None Final    Trichomonas, UA 07/04/2020 NOT REPORTED  None Final    Amorphous, UA 07/04/2020 NOT REPORTED  None Final    Other Observations UA 07/04/2020 NOT REPORTED  NOT REQ. Final    Yeast, UA 07/04/2020 NOT REPORTED  None Final    Specimen Description 07/04/2020 . CLEAN CATCH URINE   Final    Special Requests 2020 NOT REPORTED   Final    Culture 2020 NO SIGNIFICANT GROWTH   Final    Hemoglobin 2020 8.4* 12.0 - 16.0 g/dL Final    Hematocrit 2020 26.1* 36 - 46 % Final    Hemoglobin 2020 8.5* 12.0 - 16.0 g/dL Final    Hematocrit 2020 26.0* 36 - 46 % Final    Hemoglobin 2020 8.3* 12.0 - 16.0 g/dL Final    Hematocrit 2020 26.8* 36 - 46 % Final         [facility]  [address]  [phone]      Discharge Instructions for  Birth     During a  section (), an incision is made in the abdomen and uterus (womb) to deliver the baby. The normal hospital stay is 2-4 days. Steps to Take   Home Care    · For the first 1-2 weeks, ask for someone to help you at home. · Let people help you. Take frequent rest breaks. · For vaginal bleeding, use extra absorbent pads. · Keep the incision area clean and dry. · Ask your doctor about when it is safe to shower, bathe, or soak in water. · Avoid heavy lifting for six weeks. Diet    After a  birth, you will start with a clear liquid diet. Examples include: Jell-o, broth, and ginger ale. If you tolerate that, you can slowly go back to your regular diet. Stay away from anything greasy or spicy right after surgery. These types of foods can upset your stomach. Drink lots of fluids to prevent constipation. Physical Activity    · Do not lift anything heavier than your baby. · When shifting positions, use a pillow to support the area where the incisions were made. · Get up slowly. This will help you to avoid feeling dizzy or light headed. · Try to move around each day. Light physical activity will help with your recovery. · Ask your doctor when you will be able to go back to work. · Do not drive unless your doctor has given you permission to do so. Do not drive if you are taking prescription pain medicine.     · Ask your doctor when you will be able to resume sexual activity. If you have not done so already, talk to your doctor about birth control options. Medications    Your doctor may recommend pain medicine to ease discomfort. If you are taking medicines, follow these general guidelines:   · Take your medicine as directed. Do not change the amount or the schedule. · Do not stop taking them without talking to your doctor. · Do not share them. · Know what the results and side effects. Report them to your doctor. · Some drugs can be dangerous when mixed. Talk to a doctor or pharmacist if you are taking more than one drug. This includes over-the-counter medicine and herb or dietary supplements. · Plan ahead for refills so you don't run out. Lifestyle Changes    You and your doctor will plan lifestyle changes that will help you recover. To get encouragement and learn strategies, consider joining a support group for new mothers. Follow-up   Make a follow-up appointment as directed by your doctor. Call Your Doctor If Any of the Following Occurs   After you leave the hospital, call your doctor if any of the following occurs:   · Signs of infection, including fever and chills   · Heavy vaginal bleeding   · Foul-smelling vaginal discharge   · Excessive bleeding, redness, swelling, increasing pain or discharge from the incision site   · Nausea and/or vomiting that you cannot control with the medicines you were given after surgery, or which persist for more than two days after discharge from the hospital   · Pain that you cannot control with the medicines you have been given   · Swelling and/or pain in one or both legs   · Cough, shortness of breath, or chest pain   · Joint pain, fatigue, stiffness, rash, or other new symptoms   · Become dizzy or faint   If you think you have an emergency,  CALL 911  .          Home care, Restrictions,  Follow up Care, and birth control review completed    RTO 1 weeks    Secondary Smoke risks and Sudden Infant Death Syndrome were reviewed with recommendations. Cessation options discussed. Signs and Symptoms of Post Partum Depression were reviewed. The patient is to call if any occur. Signs and symptoms of Mastitis were reviewed. The patient is to call if any occur for follow up.       Attending's Name:  Electronically signed by Silvestre Jeans, DO on 7/5/2020 at 6:35 AM

## 2020-07-05 NOTE — PROGRESS NOTES
POST OPERATIVE DAY # 2    Ange Johnson is a 36 y.o. female   This patient was seen and examined today. Patient is s/p PLTCS on 20. Her pregnancy was complicated by:   Patient Active Problem List   Diagnosis    ASCUS with positive high risk HPV cervical    Atypical squamous cell changes of undetermined significance (ASCUS) on cervical cytology with positive high risk human papilloma virus (HPV)    HPV in female    Hx of pancreatitis    Depression/Anxiety    Migraine without aura    History of alcohol abuse    History of cholecystectomy    Elective  X 2    Hx of forceps delivery in prior pregnancy, currently pregnant     BMI 39.0-39.9,adult    Cholelithiasis and cholecystitis without obstruction    Alcohol abuse    Disorder of lung    Epigastric pain    Headache    Microscopic hematuria    Overweight    Plantar fascial fibromatosis    Tinea corporis    Cervical atypism    GBS (group B Streptococcus carrier), +RV culture, currently pregnant    LGA (large for gestational age) fetus affecting management of mother 2020    Elevated 1hr GTT, normal 3hr GTT    Chlamydia trachomatis infection in pregnancy on 20 (needs COLLEEN)    Rh+/RI/GBS bacteruria    Patient-requested procedure    38 weeks gestation of pregnancy    PLTCS 20 F Apg 8/9 Wt 8#11       Today she is doing well without any chief complaint. Her lochia is light. She denies chest pain, shortness of breath, headache, lightheadedness and dizziness. She is  breast feeding and she denies any signs or symptoms of mastitis. She is ambulating well. She is voiding without difficulty. She currently denies S/S of postpartum depression. Flatus present. Bowel movement absent. She is tolerating solids. Patient denies fever, chills, chest pain, shortness of breath, nausea, vomiting, headache, vision changes, or RUQ pain, or calf pain. Pt unsure if she wants to go home today.       Vital Signs:  Vitals:    20 0912 07/05/20 1347 07/05/20 1913 07/05/20 1914   BP: (!) 104/55 104/70  107/64   Pulse: 82 89  74   Resp: 16 16  16   Temp: 96.4 °F (35.8 °C) 97.3 °F (36.3 °C) 96.4 °F (35.8 °C)    TempSrc: Infrared Infrared  Infrared   SpO2: 98% 98%     Weight:       Height:             Physical Exam:  General:  no apparent distress, alert and cooperative  Neurologic:  alert, oriented, normal speech, no focal findings or movement disorder noted  Lungs:  No increased work of breathing, good air exchange, clear to auscultation bilaterally, no crackles or wheezing  Heart:  Regular rate and rhythm, normal S1 and S2, no S3 or S4, and no murmur noted    Abdomen: abdomen soft, non-distended, appropriate tenderness to palpation, normoactive bowel sounds throughout  Fundus: non-tender, normal size, firm, below umbilicus  Incision: HERRERA in place and functioning- minimal drainage noted  Extremities:  no calf tenderness, non edematous    Labs:  Lab Results   Component Value Date    WBC 11.8 (H) 07/04/2020    HGB 8.3 (L) 07/05/2020    HCT 26.8 (L) 07/05/2020    MCV 74.7 (L) 07/04/2020     07/04/2020       Assessment/Plan:  1. Daren Burciaga is a [id number] POD # 2 s/p PLTCS   - Doing well, VSS    - female infant in [address]   - Encourage ambulation and use of incentive spirometer   - POD#1 H&H completed   - HERRERA in place and functioning   - Pt unsure if she wants to go home today  2. Rh positive/Rubella immune  3. Breast feeding   4. Postpartum hemorrhage (QBL 1172 + 209 + 525 + 174)   - Pt denies any s/s anemia   - Pt reports light lochia   - S/p methergine IM x1 and methergine 200mcg PO TID x 1 day on POD#0   - Will monitor closely  5. Anemia (Hgb 10.7>8.4>8.5)   - Pt denies any s/s anemia   - Will continue iron supplementation   - Pt to obtain H&H in 2 weeks as outpatient  6. Hx chlamydia on 6/25/20 (needs COLLEEN)   - Pt will need COLLEEN as outpatient    - Pt denies any s/s vaginal infection   7.  Hx alcohol abuse   - Pt denies any current use   - Encouraged continued cessation   - SW consult pending  8. Hx pancreatitis  9. Depression/Anxiety   - Pt denies any SI/HI   - Stable on no medications   - SW consult pending  10. Hx migraine w/o aura   - Stable on no medications  11. Hx ASCUS +HRHPV   - Last Pap smear 6/3/19 negative with HPV negative  12. Continue post-op care. Counseling Completed:  Secondary Smoke risks and Sudden Infant Death Syndrome were reviewed with recommendations. Infant sleeping, \"back to sleep\" and avoidance of co-sleeping recommendations were reviewed. Signs and Symptoms of Post Partum Depression were reviewed. The patient is to call if any occur. Signs and symptoms of Mastitis were reviewed. The patient is to call if any occur for follow up.   Discharge instructions including pelvic rest, incision care, 15 lb weight restriction, no driving with pain medicine and office follow-up were reviewed with patient     Providers Name: Dr. Frankie Gambino DO  Ob/Gyn Resident  7/6/2020, 3:24 AM

## 2020-07-05 NOTE — PLAN OF CARE
Problem: Venous Thromboembolism - Risk of:  Goal: Will show no signs or symptoms of venous thromboembolism  Description: Will show no signs or symptoms of venous thromboembolism  7/5/2020 0346 by Joseph Zimmerman RN  Outcome: Met This Shift  7/4/2020 1514 by Jaimee Benavides RN  Outcome: Ongoing     Problem: Pain:  Goal: Pain level will decrease  Description: Pain level will decrease  7/5/2020 0346 by Joseph Zimmerman RN  Outcome: Ongoing  7/4/2020 1514 by Jaimee Benavides RN  Outcome: Ongoing  Goal: Control of acute pain  Description: Control of acute pain  7/5/2020 0346 by Joseph Zimmerman RN  Outcome: Ongoing  7/4/2020 1514 by Jaimee Benavides RN  Outcome: Ongoing  Goal: Control of chronic pain  Description: Control of chronic pain  7/5/2020 0346 by Joseph Zimmerman RN  Outcome: Ongoing  7/4/2020 1514 by Jaimee Benavides RN  Outcome: Ongoing     Problem: Discharge Planning:  Goal: Discharged to appropriate level of care  Description: Discharged to appropriate level of care  Outcome: Ongoing     Problem: Fluid Volume - Imbalance:  Goal: Absence of postpartum hemorrhage signs and symptoms  Description: Absence of postpartum hemorrhage signs and symptoms  Outcome: Ongoing  Goal: Absence of imbalanced fluid volume signs and symptoms  Description: Absence of imbalanced fluid volume signs and symptoms  Outcome: Ongoing     Problem: Infection - Surgical Site:  Goal: Will show no infection signs and symptoms  Description: Will show no infection signs and symptoms  Outcome: Ongoing     Problem: Nausea/Vomiting:  Goal: Absence of nausea/vomiting  Description: Absence of nausea/vomiting  Outcome: Ongoing     Problem: Venous Thromboembolism:  Goal: Absence of signs or symptoms of impaired coagulation  Description: Absence of signs or symptoms of impaired coagulation  Outcome: Ongoing     Problem: Anxiety:  Goal: Level of anxiety will decrease  Description: Level of anxiety will decrease  7/4/2020 1514

## 2020-07-05 NOTE — PROGRESS NOTES
POST OPERATIVE DAY # 1    Nacho Quintero is a 36 y.o. female   This patient was seen and examined today. PLTCS on 20    Her pregnancy was complicated by:   Patient Active Problem List   Diagnosis    ASCUS with positive high risk HPV cervical    Atypical squamous cell changes of undetermined significance (ASCUS) on cervical cytology with positive high risk human papilloma virus (HPV)    HPV in female    Hx of pancreatitis    Depression/Anxiety    Migraine without aura    History of alcohol abuse    History of cholecystectomy    Elective  X 2    Hx of forceps delivery in prior pregnancy, currently pregnant     BMI 39.0-39.9,adult    Cholelithiasis and cholecystitis without obstruction    Alcohol abuse    Disorder of lung    Epigastric pain    Headache    Microscopic hematuria    Overweight    Plantar fascial fibromatosis    Tinea corporis    Cervical atypism    GBS (group B Streptococcus carrier), +RV culture, currently pregnant    LGA (large for gestational age) fetus affecting management of mother 2020    Elevated 1hr GTT, normal 3hr GTT    Chlamydia trachomatis infection in pregnancy on 20 (needs COLLEEN)    Rh+/RI/GBS bacteruria    Patient-requested procedure    38 weeks gestation of pregnancy    PLTCS 20 F Apg 8/9 Wt 8#11       Today she is doing well without any chief complaint. Her lochia is light. She denies chest pain, shortness of breath, headache, lightheadedness, blurred vision and peripheral edema. She is breast feeding and she denies any signs or symptoms of mastitis. She is ambulating well. She is voiding without difficulty. She currently denies S/S of postpartum depression. Flatus present. Bowel movement absent. She is tolerating solids.     Vital Signs:  Vitals:    20 1555 20 1904 20 2303 20 0234   BP: (!) 103/56 113/72 (!) 106/59 113/63   Pulse: 73 77 79 85   Resp: 16 16 16 16   Temp: 98.1 °F (36.7 °C) 98.5 °F (36.9 °C) 98.7 °F (37.1 °C) 99 °F (37.2 °C)   TempSrc: Infrared Oral Oral Oral   SpO2:       Weight:       Height:            Urine Input & Output last 24hrs:     Intake/Output Summary (Last 24 hours) at 7/5/2020 0341  Last data filed at 7/5/2020 0231  Gross per 24 hour   Intake 5720 ml   Output 5650 ml   Net 70 ml       Physical Exam:  General:  no apparent distress, alert and cooperative  Neurologic:  alert, oriented, normal speech, no focal findings or movement disorder noted  Lungs:  No increased work of breathing, good air exchange, clear to auscultation bilaterally, no crackles or wheezing  Heart:  Regular rate and rhythm, normal S1 and S2, no S3 or S4, and no murmur noted    Abdomen: abdomen soft, non-distended, non-tender, bowel sounds present  Fundus: non-tender, firm, below umbilicus  Incision: HERRERA in place with minimal breakthrough bleeding  Extremities:  no calf tenderness, non edematous    Labs:  Lab Results   Component Value Date    WBC 11.8 (H) 07/04/2020    HGB 8.5 (L) 07/04/2020    HCT 26.0 (L) 07/04/2020    MCV 74.7 (L) 07/04/2020     07/04/2020       Assessment/Plan:  1. Sudhir Liriano is a [id number] POD # 1 s/p PLTCS   - Doing well, VSS    - Female infant in [address]   - Encourage ambulation and use of incentive spirometer   - D/C meadows catheter and saline lock IV this AM, voided spontaneously   - Trending Hgb, repeat ordered for this AM   - Percocet/Naproxen ordered for pain relief   - Ancef x24hr  2. Rh positive/Rubella immune  3. Breast feeding    - Denies s/s mastitis  4. Postpartum Hemorrhage (Total QBL 2250)   - Bleeding much improved   - VSS, denies orthostatic s/s   - S/p Methergine IM x1   - Methergine 0.2 TID x3 doses   - Hgb 10.7>8.4>8.5   - Repeat H&H ordered for this AM  5. Hx Chlamydia   - Positive 6/25/20   - TOR outpatient in a few weeks  6.  Depression/Anxiety   - Symptoms stable on no medication   - Denies s/s PP depression   - Mood stable, admits to good support system  7. Hx Migraines   - Denies current headache or other s/s preE   - VSS, no elevated BP  8. Hx ASCUS, HPV+   - Most recent pap negative, HPV negative 6/3/19   - Follow up outpatient  9. Anemia   - Hgb 10.7>8.4>8.5   - Repeat H&H ordered for this AM   - PO iron ordered, will d/c with Rx   - 2wk postpartum H&H on d/c  10. BMI 39.2  11. Continue post-op care. Counseling Completed:  Secondary Smoke risks and Sudden Infant Death Syndrome were reviewed with recommendations. Infant sleeping, \"back to sleep\" and avoidance of co-sleeping recommendations were reviewed. Signs and Symptoms of Post Partum Depression were reviewed. The patient is to call if any occur. Signs and symptoms of Mastitis were reviewed. The patient is to call if any occur for follow up.   Discharge instructions including pelvic rest, incision care, 15 lb weight restriction, no driving with pain medicine and office follow-up were reviewed with patient     Attending Physician: Dr. Ana Means DO  Ob/Gyn Resident  7/5/2020, 3:41 AM

## 2020-07-05 NOTE — PROGRESS NOTES
Obstetric/Gynecology Resident Interval Note    AM labs reviewed. Hgb stable 8.3. Vitals stable, will d/c with PO iron and give lab slip for 2 week H&H outpatient. Vitals:    07/04/20 1555 07/04/20 1904 07/04/20 2303 07/05/20 0234   BP: (!) 103/56 113/72 (!) 106/59 113/63   Pulse: 73 77 79 85   Resp: 16 16 16 16   Temp: 98.1 °F (36.7 °C) 98.5 °F (36.9 °C) 98.7 °F (37.1 °C) 99 °F (37.2 °C)   TempSrc: Infrared Oral Oral Oral   SpO2:       Weight:       Height:            Recent Results (from the past 6 hour(s))   Hemoglobin and hematocrit, blood    Collection Time: 07/05/20  5:56 AM   Result Value Ref Range    Hemoglobin 8.3 (L) 12.0 - 16.0 g/dL    Hematocrit 26.8 (L) 36 - 46 %      Continue routine postop care.       Claybon Babinski, DO  OB/GYN Resident, PGY2  [facility]  7/5/2020, 6:32 AM

## 2020-07-05 NOTE — PROGRESS NOTES
Obstetric/Gynecology Resident Interval Note    Bleeding stable, urine output adequate. Will d/c meadows and saline lock IV @0230. Vitals remain stable at this time. Vitals:    07/04/20 1415 07/04/20 1555 07/04/20 1904 07/04/20 2303   BP: 108/62 (!) 103/56 113/72 (!) 106/59   Pulse: 93 73 77 79   Resp: 16 16 16 16   Temp: 98.4 °F (36.9 °C) 98.1 °F (36.7 °C) 98.5 °F (36.9 °C) 98.7 °F (37.1 °C)   TempSrc: Infrared Infrared Oral Oral   SpO2:       Weight:       Height:          I/O last 3 completed shifts: In: 5956 [P.O.:360; I.V.:4255]  Out: 3672 [Urine:1725; Blood:2850]  I/O this shift:  In: -   Out: 900 [Urine:900]    Continue to monitor closely.     Jo-Ann Au DO  OB/GYN Resident, PGY2  [address] [place] [address]  7/5/2020, 1:42 AM

## 2020-07-06 PROCEDURE — 6370000000 HC RX 637 (ALT 250 FOR IP): Performed by: STUDENT IN AN ORGANIZED HEALTH CARE EDUCATION/TRAINING PROGRAM

## 2020-07-06 PROCEDURE — 1220000000 HC SEMI PRIVATE OB R&B

## 2020-07-06 PROCEDURE — 99024 POSTOP FOLLOW-UP VISIT: CPT | Performed by: OBSTETRICS & GYNECOLOGY

## 2020-07-06 RX ADMIN — OXYCODONE HYDROCHLORIDE AND ACETAMINOPHEN 1 TABLET: 5; 325 TABLET ORAL at 01:56

## 2020-07-06 RX ADMIN — DOCUSATE SODIUM 100 MG: 100 CAPSULE, LIQUID FILLED ORAL at 21:05

## 2020-07-06 RX ADMIN — FERROUS SULFATE TAB 325 MG (65 MG ELEMENTAL FE) 325 MG: 325 (65 FE) TAB at 07:53

## 2020-07-06 RX ADMIN — MAGNESIUM HYDROXIDE 30 ML: 400 SUSPENSION ORAL at 15:27

## 2020-07-06 RX ADMIN — OXYCODONE HYDROCHLORIDE AND ACETAMINOPHEN 1 TABLET: 5; 325 TABLET ORAL at 21:05

## 2020-07-06 RX ADMIN — SIMETHICONE 80 MG: 80 TABLET, CHEWABLE ORAL at 01:56

## 2020-07-06 RX ADMIN — NAPROXEN 500 MG: 500 TABLET ORAL at 01:56

## 2020-07-06 RX ADMIN — NAPROXEN 500 MG: 500 TABLET ORAL at 15:27

## 2020-07-06 RX ADMIN — OXYCODONE HYDROCHLORIDE AND ACETAMINOPHEN 1 TABLET: 5; 325 TABLET ORAL at 07:55

## 2020-07-06 RX ADMIN — FERROUS SULFATE TAB 325 MG (65 MG ELEMENTAL FE) 325 MG: 325 (65 FE) TAB at 21:04

## 2020-07-06 RX ADMIN — DOCUSATE SODIUM 100 MG: 100 CAPSULE, LIQUID FILLED ORAL at 07:53

## 2020-07-06 RX ADMIN — OXYCODONE HYDROCHLORIDE AND ACETAMINOPHEN 1 TABLET: 5; 325 TABLET ORAL at 13:57

## 2020-07-06 ASSESSMENT — PAIN DESCRIPTION - ORIENTATION: ORIENTATION: MID;LOWER

## 2020-07-06 ASSESSMENT — PAIN SCALES - GENERAL
PAINLEVEL_OUTOF10: 4
PAINLEVEL_OUTOF10: 4
PAINLEVEL_OUTOF10: 2
PAINLEVEL_OUTOF10: 6
PAINLEVEL_OUTOF10: 6
PAINLEVEL_OUTOF10: 4

## 2020-07-06 ASSESSMENT — PAIN DESCRIPTION - LOCATION
LOCATION: ABDOMEN
LOCATION: ABDOMEN

## 2020-07-06 ASSESSMENT — PAIN DESCRIPTION - PAIN TYPE
TYPE: SURGICAL PAIN
TYPE: SURGICAL PAIN

## 2020-07-06 ASSESSMENT — PAIN DESCRIPTION - FREQUENCY: FREQUENCY: CONTINUOUS

## 2020-07-06 ASSESSMENT — PAIN DESCRIPTION - DESCRIPTORS: DESCRIPTORS: DISCOMFORT

## 2020-07-06 NOTE — PLAN OF CARE
Problem: Pain:  Goal: Pain level will decrease  Description: Pain level will decrease  Outcome: Ongoing     Problem: Discharge Planning:  Goal: Discharged to appropriate level of care  Description: Discharged to appropriate level of care  Outcome: Ongoing     Problem: Infection - Surgical Site:  Goal: Will show no infection signs and symptoms  Description: Will show no infection signs and symptoms  Outcome: Ongoing

## 2020-07-06 NOTE — PROGRESS NOTES
Obstetrical Rounds:    POD#: 2  Hospital Day: 3  Procedure: primary  section    Date: 2020  Time: 7:30 AM        Patient Name: Dennis Waldron  Patient : 1985  Room/Bed: 0410/6506-98  Admission Date/Time: 7/3/2020 11:24 PM  MRN #: [id number]  [facility] #: [id number]        Attending Physician Statement  I have discussed the care of Dennis Waldron, including pertinent history and exam findings,  with the resident. I have reviewed their note in the electronic medical record. I have seen and examined the patient and the key elements of all parts of the encounter have been performed/reviewed by me . I agree with the assessment, plan and orders as documented by the resident. Pt seen & examined. Pt passing flatus. Pt denies lightheadedness/ dizziness. Pt tolerating diet. Plan discharge later today or tomorrow. Repeat H/H 2 weeks    Vitals:    20 1914   BP: 107/64   Pulse: 74   Resp: 16   Temp:    SpO2:        Admission on 2020   Component Date Value Ref Range Status    Expiration Date 2020,2359   Final    Arm Band Number 2020 [id number]   Final    ABO/Rh 2020 B POSITIVE   Final    Antibody Screen 2020 NEGATIVE   Final    WBC 2020 11.8* 3.5 - 11.0 k/uL Final    RBC 2020 4.38  4.0 - 5.2 m/uL Final    Hemoglobin 2020 10.7* 12.0 - 16.0 g/dL Final    Hematocrit 2020 32.8* 36 - 46 % Final    MCV 2020 74.7* 80 - 100 fL Final    MCH 2020 24.4* 26 - 34 pg Final    MCHC 2020 32.6  31 - 37 g/dL Final    RDW 2020 14.7  11.5 - 14.9 % Final    Platelets  411  150 - 450 k/uL Final    MPV 2020 8.8  6.0 - 12.0 fL Final    NRBC Automated 2020 NOT REPORTED  per 100 WBC Final    T. pallidum, IgG 2020 NONREACTIVE  NONREACTIVE Final    Comment:       T. pallidum antibodies are not detected.   There is no serological evidence of infection with T. pallidum (early primary syphilis   cannot be excluded). Retest in 2-4 weeks if syphilis is clinically suspect.  Protime 07/04/2020 12.5  11.8 - 14.6 sec Final    INR 07/04/2020 1.0   Final    Comment:       Non-therapeutic Range:     INR = 0.9-1.2  Therapeutic Range:    Moderate Anticoagulant Intensity:     INR = 2.0-3.0   High Anticoagulant Intensity:     INR = 2.5-3.5            PTT 07/04/2020 29.3  24.0 - 36.0 sec Final    Comment:       IV Heparin Therapy Range:      62.0-94.0            Fibrinogen 07/04/2020 451  210 - 530 mg/dL Final    Amphetamine Screen, Ur 07/04/2020 NEGATIVE  NEGATIVE Final    Comment:       (Positive cutoff 1000 ng/mL)                  Barbiturate Screen, Ur 07/04/2020 NEGATIVE  NEGATIVE Final    Comment:       (Positive cutoff 200 ng/mL)                  Benzodiazepine Screen, Urine 07/04/2020 NEGATIVE  NEGATIVE Final    Comment:       (Positive cutoff 200 ng/mL)                  Cocaine Metabolite, Urine 07/04/2020 NEGATIVE  NEGATIVE Final    Comment:       (Positive cutoff 300 ng/mL)                  Methadone Screen, Urine 07/04/2020 NEGATIVE  NEGATIVE Final    Comment:       (Positive cutoff 300 ng/mL)                  Opiates, Urine 07/04/2020 NEGATIVE  NEGATIVE Final    Comment:       (Positive cutoff 300 ng/mL)                  Phencyclidine, Urine 07/04/2020 NEGATIVE  NEGATIVE Final    Comment:       (Positive cutoff 25 ng/mL)                  Propoxyphene, Urine 07/04/2020 NOT REPORTED  NEGATIVE Final    Cannabinoid Scrn, Ur 07/04/2020 NEGATIVE  NEGATIVE Final    Comment:       (Positive cutoff 50 ng/mL)                  Oxycodone Screen, Ur 07/04/2020 NEGATIVE  NEGATIVE Final    Comment:       (Positive cutoff 100 ng/mL)                  Methamphetamine, Urine 07/04/2020 NOT REPORTED  NEGATIVE Final    Tricyclic Antidepressants, Urine 07/04/2020 NOT REPORTED  NEGATIVE Final    MDMA, Urine 07/04/2020 NOT REPORTED  NEGATIVE Final    Buprenorphine Urine 07/04/2020 NOT REPORTED  NEGATIVE Final  Test Information 07/04/2020 Assay provides medical screening only. The absence of expected drug(s) and/or metabolite(s) may indicate diluted or adulterated urine, limitations of testing or timing of collection. Final    Comment: Testing for legal purposes should be confirmed by another method. To request confirmation   of test result, please call the lab within 7 days of sample submission.  Color, UA 07/04/2020 DARK YELLOW* YELLOW Final    Turbidity UA 07/04/2020 TURBID* CLEAR Final    Glucose, Ur 07/04/2020 NEGATIVE  NEGATIVE Final    Bilirubin Urine 07/04/2020 NEGATIVE  NEGATIVE Final    Ketones, Urine 07/04/2020 NEGATIVE  NEGATIVE Final    Specific Gravity, UA 07/04/2020 1.025  1.000 - 1.030 Final    Urine Hgb 07/04/2020 NEGATIVE  NEGATIVE Final    pH, UA 07/04/2020 6.0  5.0 - 8.0 Final    Protein, UA 07/04/2020 TRACE* NEGATIVE Final    Urobilinogen, Urine 07/04/2020 Normal  Normal Final    Nitrite, Urine 07/04/2020 NEGATIVE  NEGATIVE Final    Leukocyte Esterase, Urine 07/04/2020 MOD* NEGATIVE Final    Urinalysis Comments 07/04/2020 NOT REPORTED   Final    - 07/04/2020        Final    WBC, UA 07/04/2020 10 TO 20  /HPF Final    RBC, UA 07/04/2020 0 TO 2  /HPF Final    Casts UA 07/04/2020 NOT REPORTED  /LPF Final    Crystals, UA 07/04/2020 CALCIUM OXALATE* None /HPF Final    Crystals, UA 07/04/2020 MODERATE* None /HPF Final    Epithelial Cells UA 07/04/2020 10 TO 20  /HPF Final    Renal Epithelial, UA 07/04/2020 NOT REPORTED  0 /HPF Final    Bacteria, UA 07/04/2020 MODERATE* None Final    Mucus, UA 07/04/2020 NOT REPORTED  None Final    Trichomonas, UA 07/04/2020 NOT REPORTED  None Final    Amorphous, UA 07/04/2020 NOT REPORTED  None Final    Other Observations UA 07/04/2020 NOT REPORTED  NOT REQ. Final    Yeast, UA 07/04/2020 NOT REPORTED  None Final    Specimen Description 07/04/2020 . CLEAN CATCH URINE   Final    Special Requests 07/04/2020 NOT REPORTED   Final    Culture 2020 NO SIGNIFICANT GROWTH   Final    Hemoglobin 2020 8.4* 12.0 - 16.0 g/dL Final    Hematocrit 2020 26.1* 36 - 46 % Final    Hemoglobin 2020 8.5* 12.0 - 16.0 g/dL Final    Hematocrit 2020 26.0* 36 - 46 % Final    Hemoglobin 2020 8.3* 12.0 - 16.0 g/dL Final    Hematocrit 2020 26.8* 36 - 46 % Final         [facility]  [facility] [address]  [phone]      Discharge Instructions for  Birth     During a  section (), an incision is made in the abdomen and uterus (womb) to deliver the baby. The normal hospital stay is 2-4 days. Steps to Take   Home Care    · For the first 1-2 weeks, ask for someone to help you at home. · Let people help you. Take frequent rest breaks. · For vaginal bleeding, use extra absorbent pads. · Keep the incision area clean and dry. · Ask your doctor about when it is safe to shower, bathe, or soak in water. · Avoid heavy lifting for six weeks. Diet    After a  birth, you will start with a clear liquid diet. Examples include: Jell-o, broth, and ginger ale. If you tolerate that, you can slowly go back to your regular diet. Stay away from anything greasy or spicy right after surgery. These types of foods can upset your stomach. Drink lots of fluids to prevent constipation. Physical Activity    · Do not lift anything heavier than your baby. · When shifting positions, use a pillow to support the area where the incisions were made. · Get up slowly. This will help you to avoid feeling dizzy or light headed. · Try to move around each day. Light physical activity will help with your recovery. · Ask your doctor when you will be able to go back to work. · Do not drive unless your doctor has given you permission to do so. Do not drive if you are taking prescription pain medicine.     · Ask your doctor when you will be able to resume sexual activity. If you have not done so already, talk to your doctor about birth control options. Medications    Your doctor may recommend pain medicine to ease discomfort. If you are taking medicines, follow these general guidelines:   · Take your medicine as directed. Do not change the amount or the schedule. · Do not stop taking them without talking to your doctor. · Do not share them. · Know what the results and side effects. Report them to your doctor. · Some drugs can be dangerous when mixed. Talk to a doctor or pharmacist if you are taking more than one drug. This includes over-the-counter medicine and herb or dietary supplements. · Plan ahead for refills so you don't run out. Lifestyle Changes    You and your doctor will plan lifestyle changes that will help you recover. To get encouragement and learn strategies, consider joining a support group for new mothers. Follow-up   Make a follow-up appointment as directed by your doctor. Call Your Doctor If Any of the Following Occurs   After you leave the hospital, call your doctor if any of the following occurs:   · Signs of infection, including fever and chills   · Heavy vaginal bleeding   · Foul-smelling vaginal discharge   · Excessive bleeding, redness, swelling, increasing pain or discharge from the incision site   · Nausea and/or vomiting that you cannot control with the medicines you were given after surgery, or which persist for more than two days after discharge from the hospital   · Pain that you cannot control with the medicines you have been given   · Swelling and/or pain in one or both legs   · Cough, shortness of breath, or chest pain   · Joint pain, fatigue, stiffness, rash, or other new symptoms   · Become dizzy or faint   If you think you have an emergency,  CALL 911  .          Home care, Restrictions,  Follow up Care, and birth control review completed    RTO 1 weeks    Secondary Smoke risks and Sudden Infant Death Syndrome were reviewed with recommendations. Cessation options discussed. Signs and Symptoms of Post Partum Depression were reviewed. The patient is to call if any occur. Signs and symptoms of Mastitis were reviewed. The patient is to call if any occur for follow up.       Attending's Name:  Electronically signed by Jeffry Sequeira DO on 7/6/2020 at 7:30 AM

## 2020-07-06 NOTE — LACTATION NOTE
This note was copied from a baby's chart. Lactation round made. Mother states breastfeeding is going well. Mother states she nursed her other daughters for 1-2 months. Mother is hoping to breastfeed longer. Mother states she has a Medela pump and another pump for this baby. Mother states she did not have a pump with her first two children. Information on going back to work with pumping, milk storage guidelines given, and breast and nipple care. Mother denies a painful latch. Mother states baby is voiding and stooling as appropriate for age. Mother states color of stool is black to dark brown Weight loss WNL. Bilirubin reviewed, 4.9, low risk. Mother states she may be discharged later in the afternoon. Mother breastfeeding at this time. Mother encouraged to call out for breastfeeding assistance and questions.
of a Good Feeding. Discussed handouts with mother verbalizing understanding and encouraged mother  to view video clips.

## 2020-07-07 VITALS
HEIGHT: 65 IN | WEIGHT: 235 LBS | BODY MASS INDEX: 39.15 KG/M2 | HEART RATE: 88 BPM | DIASTOLIC BLOOD PRESSURE: 63 MMHG | TEMPERATURE: 98.6 F | SYSTOLIC BLOOD PRESSURE: 111 MMHG | RESPIRATION RATE: 16 BRPM | OXYGEN SATURATION: 98 %

## 2020-07-07 PROCEDURE — 6370000000 HC RX 637 (ALT 250 FOR IP): Performed by: STUDENT IN AN ORGANIZED HEALTH CARE EDUCATION/TRAINING PROGRAM

## 2020-07-07 RX ADMIN — OXYCODONE HYDROCHLORIDE AND ACETAMINOPHEN 1 TABLET: 5; 325 TABLET ORAL at 08:15

## 2020-07-07 RX ADMIN — FERROUS SULFATE TAB 325 MG (65 MG ELEMENTAL FE) 325 MG: 325 (65 FE) TAB at 08:09

## 2020-07-07 RX ADMIN — NAPROXEN 500 MG: 500 TABLET ORAL at 04:14

## 2020-07-07 RX ADMIN — OXYCODONE HYDROCHLORIDE AND ACETAMINOPHEN 1 TABLET: 5; 325 TABLET ORAL at 04:14

## 2020-07-07 RX ADMIN — DOCUSATE SODIUM 100 MG: 100 CAPSULE, LIQUID FILLED ORAL at 08:09

## 2020-07-07 ASSESSMENT — PAIN DESCRIPTION - LOCATION: LOCATION: ABDOMEN

## 2020-07-07 ASSESSMENT — PAIN SCALES - GENERAL
PAINLEVEL_OUTOF10: 5
PAINLEVEL_OUTOF10: 4
PAINLEVEL_OUTOF10: 3
PAINLEVEL_OUTOF10: 6

## 2020-07-07 ASSESSMENT — PAIN DESCRIPTION - PAIN TYPE: TYPE: ACUTE PAIN

## 2020-07-07 ASSESSMENT — PAIN DESCRIPTION - ORIENTATION: ORIENTATION: LOWER

## 2020-07-07 NOTE — PROGRESS NOTES
POST OPERATIVE DAY # 3    Nacho Quintero is a 36 y.o. female   This patient was seen and examined today. Patient is s/p PLTCS on 20. Her pregnancy was complicated by:   Patient Active Problem List   Diagnosis    ASCUS with positive high risk HPV cervical    Atypical squamous cell changes of undetermined significance (ASCUS) on cervical cytology with positive high risk human papilloma virus (HPV)    HPV in female    Hx of pancreatitis    Depression/Anxiety    Migraine without aura    History of alcohol abuse    History of cholecystectomy    Elective  X 2    Hx of forceps delivery in prior pregnancy, currently pregnant     BMI 39.0-39.9,adult    Cholelithiasis and cholecystitis without obstruction    Alcohol abuse    Disorder of lung    Epigastric pain    Headache    Microscopic hematuria    Overweight    Plantar fascial fibromatosis    Tinea corporis    Cervical atypism    GBS (group B Streptococcus carrier), +RV culture, currently pregnant    LGA (large for gestational age) fetus affecting management of mother 2020    Elevated 1hr GTT, normal 3hr GTT    Chlamydia trachomatis infection in pregnancy on 20 (needs COLLEEN)    Rh+/RI/GBS bacteruria    Patient-requested procedure    38 weeks gestation of pregnancy    PLTCS 20 F Apg 8/9 Wt 8#11       Today she is doing well without any chief complaint. Her lochia is light. She denies headache, lightheadedness and dizziness. She is  breast feeding and she denies any signs or symptoms of mastitis. She is ambulating well. She is voiding without difficulty. She currently denies S/S of postpartum depression. Flatus present. Bowel movement present. She is tolerating solids. Patient denies fever, chills, chest pain, shortness of breath, nausea, vomiting, headache, vision changes, or RUQ pain, or calf pain. Pt is desiring discharge home today.       Vital Signs:  Vitals:    20 1914 20 0753 20 1359 07/06/20 1527   BP: 107/64 (!) 102/56 127/71    Pulse: 74 65 82    Resp: 16 16 16    Temp:  96.6 °F (35.9 °C) 96.4 °F (35.8 °C)    TempSrc: Infrared Infrared Infrared    SpO2:  97%  98%   Weight:       Height:           Physical Exam:  General:  no apparent distress, alert and cooperative  Neurologic:  alert, oriented, normal speech, no focal findings or movement disorder noted  Lungs:  No increased work of breathing, good air exchange, clear to auscultation bilaterally, no crackles or wheezing  Heart:  Regular rate and rhythm, normal S1 and S2, no S3 or S4, and no murmur noted    Abdomen: abdomen soft, non-distended, appropriate tenderness to palpation, normoactive bowel sounds throughout  Fundus: non-tender, normal size, firm, below umbilicus  Incision: HERRERA in place and functioning with minimal drainage that is stable  Extremities:  no calf tenderness, non edematous    Labs:  Lab Results   Component Value Date    WBC 11.8 (H) 07/04/2020    HGB 8.3 (L) 07/05/2020    HCT 26.8 (L) 07/05/2020    MCV 74.7 (L) 07/04/2020     07/04/2020       Assessment/Plan:  1. Shin Lipscomb is a [id number] POD # 3 s/p PLTCS   - Doing well, VSS    - female infant in [address]   - Encourage ambulation and use of incentive spirometer   - POD#1 H&H completed   - HERRERA in place and functioning   - Pt requesting discharge home today  2. Rh positive/Rubella immune  3. Breast feeding   4. Postpartum hemorrhage (QBL 1171 +209 +525+174)   - Pt denies any s/s anemia   - Pt reports light lochia   - S/p methergine IM x1, methergine PO TID x 3doses   - VSS  5. Anemia (10.7>8.4>8.5>8.3)   - Pt denies any s/s anemia   - Continue iron supplementation   - Pt to complete H&H in 2 weeks  6. Hx of chlamydia on 6/25/20 (needs TOR)   - Needs TOR as outpatient   - Pt denies any s/s vaginal infection  7. Hx alcohol abuse    - Pt denies any current use   - SW consult pending  8. Hx pancreatitis  9.  Depression/Anxiety   - SW consult pending   - Pt denies any SI/HI   - Stable on no medications  10. Hx migraine w/o aura   - Stable on no medications  11. Continue post-op care. Counseling Completed:  Secondary Smoke risks and Sudden Infant Death Syndrome were reviewed with recommendations. Infant sleeping, \"back to sleep\" and avoidance of co-sleeping recommendations were reviewed. Signs and Symptoms of Post Partum Depression were reviewed. The patient is to call if any occur. Signs and symptoms of Mastitis were reviewed. The patient is to call if any occur for follow up.   Discharge instructions including pelvic rest, incision care, 15 lb weight restriction, no driving with pain medicine and office follow-up were reviewed with patient     Providers Name: Dr. Diane Ramos DO  Ob/Gyn Resident  7/7/2020, 12:51 AM

## 2020-07-07 NOTE — CARE COORDINATION
Social work; spoke to patient who was trying to nurse. Patient states she has had some success and yet her milk is not producing enough to be the only source of food for baby. She has called [facility] for help and they are going to return her call at 1 pm today. Pt has it all set up. She will need any formula packets available at discharge. Mom is there and willing to help also. And then she will be on [facility] soon. [facility] is already in place. Mom has had a history of depression. Since confiding in her pcp Dr. Kahlil Murillo and getting med support from that physician patient states she feels a lot better. She declines any lists of mental health agencies at this time but did agree to call if she wanted them later. Patient has 2 older kids ages 15and 6years of age. She lives with them. However mom is close and very involved also. Patient states she has a great support system. No new needs at this time.   Gutierrez mckeon

## 2020-07-07 NOTE — FLOWSHEET NOTE
Discharge instructions given to mother. Mother signs and states understanding. Copies given. Gift bags handed out. Rx given to patient or filled through outpatient pharmacy and delivered to room. Patient instructed to call OB to schedule follow up visit as suggested by doctor.  (see discharge instructions)

## 2020-07-08 LAB — SURGICAL PATHOLOGY REPORT: NORMAL

## 2020-07-10 ENCOUNTER — OFFICE VISIT (OUTPATIENT)
Dept: OBGYN CLINIC | Age: 35
End: 2020-07-10

## 2020-07-10 VITALS
TEMPERATURE: 98.1 F | BODY MASS INDEX: 37.49 KG/M2 | HEIGHT: 65 IN | SYSTOLIC BLOOD PRESSURE: 118 MMHG | WEIGHT: 225 LBS | DIASTOLIC BLOOD PRESSURE: 76 MMHG

## 2020-07-10 PROCEDURE — 99024 POSTOP FOLLOW-UP VISIT: CPT | Performed by: NURSE PRACTITIONER

## 2020-07-10 NOTE — PROGRESS NOTES
Pt seen in office for HERRERA removal.  Pt HERRERA removed without difficulty. Pt incision clear and without redness/drainage. Pt given instruction on incision care. Pt states she has no current complaints regarding her incision.

## 2020-07-16 PROBLEM — O36.60X0 LGA (LARGE FOR GESTATIONAL AGE) FETUS AFFECTING MANAGEMENT OF MOTHER: Status: RESOLVED | Noted: 2020-06-23 | Resolved: 2020-07-16

## 2020-07-16 PROBLEM — O98.319 CHLAMYDIA TRACHOMATIS INFECTION IN PREGNANCY: Status: RESOLVED | Noted: 2020-06-27 | Resolved: 2020-07-16

## 2020-07-16 PROBLEM — O09.299 HX OF FORCEPS DELIVERY IN PRIOR PREGNANCY, CURRENTLY PREGNANT: Status: RESOLVED | Noted: 2019-12-31 | Resolved: 2020-07-16

## 2020-07-16 PROBLEM — Z33.2 ELECTIVE ABORTION: Status: RESOLVED | Noted: 2019-04-15 | Resolved: 2020-07-16

## 2020-07-16 PROBLEM — A56.8 CHLAMYDIA TRACHOMATIS INFECTION IN PREGNANCY: Status: RESOLVED | Noted: 2020-06-27 | Resolved: 2020-07-16

## 2020-07-16 PROBLEM — O99.820 GBS (GROUP B STREPTOCOCCUS CARRIER), +RV CULTURE, CURRENTLY PREGNANT: Status: RESOLVED | Noted: 2020-06-22 | Resolved: 2020-07-16

## 2020-07-17 ENCOUNTER — HOSPITAL ENCOUNTER (OUTPATIENT)
Age: 35
Discharge: HOME | End: 2020-07-17
Payer: COMMERCIAL

## 2020-07-17 ENCOUNTER — OFFICE VISIT (OUTPATIENT)
Dept: OBGYN CLINIC | Age: 35
End: 2020-07-17

## 2020-07-17 VITALS
TEMPERATURE: 98.6 F | WEIGHT: 210 LBS | HEIGHT: 65 IN | SYSTOLIC BLOOD PRESSURE: 114 MMHG | BODY MASS INDEX: 34.99 KG/M2 | HEART RATE: 78 BPM | DIASTOLIC BLOOD PRESSURE: 70 MMHG

## 2020-07-17 LAB
HCT VFR BLD CALC: 31.5 % (ref 36–46)
HEMOGLOBIN: 10.2 G/DL (ref 12–16)

## 2020-07-17 PROCEDURE — 85018 HEMOGLOBIN: CPT

## 2020-07-17 PROCEDURE — 36415 COLL VENOUS BLD VENIPUNCTURE: CPT

## 2020-07-17 PROCEDURE — 85014 HEMATOCRIT: CPT

## 2020-07-17 PROCEDURE — 0503F POSTPARTUM CARE VISIT: CPT | Performed by: NURSE PRACTITIONER

## 2020-07-30 ENCOUNTER — TELEPHONE (OUTPATIENT)
Dept: OBGYN CLINIC | Age: 35
End: 2020-07-30

## 2020-07-30 NOTE — TELEPHONE ENCOUNTER
Per Ginny Helton pt instructed that Irma Yung is not safe in breastfeeding.   Pt states she is currently taking PNV and   Vitamin C.

## 2020-08-20 ENCOUNTER — TELEPHONE (OUTPATIENT)
Dept: OBGYN CLINIC | Age: 35
End: 2020-08-20

## 2020-08-20 ENCOUNTER — OFFICE VISIT (OUTPATIENT)
Dept: OBGYN CLINIC | Age: 35
End: 2020-08-20

## 2020-08-20 ENCOUNTER — HOSPITAL ENCOUNTER (OUTPATIENT)
Age: 35
Setting detail: SPECIMEN
Discharge: HOME | End: 2020-08-20
Payer: COMMERCIAL

## 2020-08-20 VITALS
BODY MASS INDEX: 34.99 KG/M2 | SYSTOLIC BLOOD PRESSURE: 116 MMHG | HEART RATE: 82 BPM | DIASTOLIC BLOOD PRESSURE: 68 MMHG | TEMPERATURE: 98.3 F | WEIGHT: 210 LBS | HEIGHT: 65 IN

## 2020-08-20 LAB
HCT VFR BLD CALC: 35.8 % (ref 36–46)
HEMOGLOBIN: 11.9 G/DL (ref 12–16)

## 2020-08-20 PROCEDURE — 0503F POSTPARTUM CARE VISIT: CPT | Performed by: NURSE PRACTITIONER

## 2020-08-20 PROCEDURE — 85014 HEMATOCRIT: CPT

## 2020-08-20 PROCEDURE — 85018 HEMOGLOBIN: CPT

## 2020-08-20 PROCEDURE — 36415 COLL VENOUS BLD VENIPUNCTURE: CPT

## 2020-08-20 NOTE — PROGRESS NOTES
Salbador Patel is a 36 y.o. female    Delivery Information:  Delivery Date: 2020    Sex of Baby: female  Type of Delivery:   Delivering Physician: dr Hermelindo Quinn Questions:  No Do you Smoke? If yes PPD is 0  Yes Do you intake caffeine? No Do you use street drugs? Yes Do you consume alcohol? No Are breast feeding? Yes Are you bottle feeding? No Are you having any problems with your breasts? (lumps, discharge, asymmetry, or redness)  No Are you having any problems with bowel movements or urination? No Are you having any vaginal discharge/itching/ or burning? Yes Are you getting help and support with the baby? No Have you had intercourse since your delivery? No If you had intercourse did you use condoms? No Have you had a menstrual cycle since delivery? Date: 0  No Do you have any questions that need to be addressed today? How long did you bleed after your delivery? 3 Weeks  What are your plans besides condoms for family planning? Paraguard   Who lives at home with you and your baby? 2 children    The patient was counseled on the risks of tobacco abuse and the harm it may cause to the patient and her  baby as well as others in the household from secondary smoke exposure. The patient was counseled on the risks of potential respiratory problems, cancers, and sudden infant death syndrome as well as other co-morbidities. These were discussed in detail. I reviewed cessation options and plans. The patient was counseled on smoking outdoors with appropriate covers of clothing and hair. She was counseled on hand washing prior to holding or picking up the baby. The patient had her questions answered in regards to the baby and was instructed to follow up with her pediatrician. She had reviewed with her safe sleep recommendations. There were no vitals filed for this visit. Physical Exam:    General Appearance:   This  is a well Developed, well Nourished, well groomed female. Her BMI was reviewed. Nutritional decision making was discussed. Skin:  There was a Normal Inspection of the skin without rashes or lesions. There were no rashes. (Papular, Maculopapular, Hives, Pustular, Macular)     There were no lesions (Ulcers, Erythema, Abn. Appearing Nevi)            Lymphatic:  No Lymph Nodes were Palpable in the neck , axilla or groin.  0 # Of Lymph Nodes; Location ; Character [Normal]  [Shotty] [Tender] [Enlarged]     Neck and EENT:  The neck was supple. There were no masses   The thyroid was not enlarged and had no masses. Perrla, EOMI B/L, TMI B/L No Abnormalities. Throat inspected-No exudates or Masses, Nares Patent No Masses        Respiratory: The lungs were auscultated and found to be clear. There were no rales, rhonchi or wheezes. There was a good respiratory effort. Cardiovascular: The heart was in a regular rate and rhythm. . No S3 or S4. There was no murmur appreciated. Location, grade, and radiation are not applicable. Extremities: The patients extremities were without calf tenderness, edema, or varicosities. There was full range of motion in all four extremities. Pulses in all four extremities were appreciated and are 2/4. Abdomen: The abdomen was soft and non-tender. There were good bowel sounds in all quadrants and there was no guarding, rebound or rigidity. On evaluation there was no evidence of hepatosplenomegaly and there was no costal vertebral adeline tenderness bilaterally. No hernias were appreciated. Abdominal Scars: dry,clean,intact    Psych: The patient had a normal Orientation to: Time, Place, Person, and Situation  There is no Mood / Affect changes    Breast:  (Chest)  deferred  Self breast exams were reviewed in detail. Literature was given. Pelvic Exam:  Exam deferred. Rectal Exam:  exam declined by patient          Assessment:  1.  Postpartum care and examination         Family planning was discussed    Signs and symptoms of mastitis were reviewed. The patient did not have any. Signs and symptoms of post partum depression were discussed the patient did have any. Tobacco abuse and secondary smoke risks to the mother and her  baby were reviewed. Sudden infant death syndrome was discussed. Cessation and proper protections discussed in detail.          Plan:  Return in about 4 weeks (around 2020) for annual.   Antibiotics and decreased efficacy with birth control reviewed  Barrier recommendations and STD counseling completed  S/S mastitis reviewed  Patient declined repeating EPDS form  States she does feel like she had PPD  Patient is reaching out to work as they have a counseling service for their employees that she has used with success before  Declined referral for counseling  Declined medication  Referred to PCP to follow up on PPD  Discussed following up with annual in a few weeks  States she developed COVID after delivery and decided to quit breastfeeding to not get the baby sick  Instructed to go to the ER if she feels like harming herself or others          Electronically signed by NAHUN Purdy NP on 20 at 9:46 AM EDT

## 2020-08-20 NOTE — TELEPHONE ENCOUNTER
----- Message from NAHUN Hilliard NP sent at 8/20/2020  2:06 PM EDT -----  Please let know H&H increasing  Continue iron

## 2020-08-20 NOTE — TELEPHONE ENCOUNTER
Per Damari Henley pt notified of H&H results increasing and pt instructed to continue with iron as directed.

## 2020-09-15 ENCOUNTER — TELEPHONE (OUTPATIENT)
Dept: OBGYN CLINIC | Age: 35
End: 2020-09-15

## 2020-09-15 RX ORDER — IBUPROFEN 800 MG/1
800 TABLET ORAL EVERY 8 HOURS PRN
Qty: 30 TABLET | Refills: 0 | Status: SHIPPED | OUTPATIENT
Start: 2020-09-15 | End: 2021-12-08

## 2020-09-15 NOTE — PROGRESS NOTES
Work note faxed to [employer] employer, approval per Maria A Pacheco CNP for off work today related to first menses following delivery in July.

## 2020-09-15 NOTE — TELEPHONE ENCOUNTER
Patient called office reporting that she was having her first period after delivering 7/4/20. Patient stated that bleeding started on Sunday. Patient is not breastfeeding. Patient states she is wearing a heavy duty pad, but is changing it every couple of hours. Patient states she believes the bleeding is heavy. Patient denies shortness of breath, dizziness, lightheadedness, or feeling weak/faint. Patient encouraged to utilize motrin to help with bleeding and period discomfort. Patient advised that if she is changing a saturated heavy duty pad every 30 minutes to an hour and/or has symptoms of blood loss (as listed above) that she should seek ER evaluation. Patient agreeable. At this time, patient denies any listed symptoms. Patient changing a saturated pad every 3-4 hours approximately. Patient at this time requesting motrin refill, pending provider's review. Patient also requesting a work note for today, stating she called off due to cramping/bleeding.

## 2020-09-30 ENCOUNTER — TELEPHONE (OUTPATIENT)
Dept: OBGYN CLINIC | Age: 35
End: 2020-09-30

## 2020-09-30 ENCOUNTER — HOSPITAL ENCOUNTER (OUTPATIENT)
Age: 35
Setting detail: SPECIMEN
Discharge: HOME | End: 2020-09-30
Payer: COMMERCIAL

## 2020-09-30 ENCOUNTER — OFFICE VISIT (OUTPATIENT)
Dept: OBGYN CLINIC | Age: 35
End: 2020-09-30
Payer: COMMERCIAL

## 2020-09-30 VITALS
BODY MASS INDEX: 35.99 KG/M2 | TEMPERATURE: 98.4 F | HEART RATE: 78 BPM | SYSTOLIC BLOOD PRESSURE: 118 MMHG | WEIGHT: 216 LBS | HEIGHT: 65 IN | DIASTOLIC BLOOD PRESSURE: 74 MMHG

## 2020-09-30 PROBLEM — O99.810 ABNORMAL GLUCOSE TOLERANCE AFFECTING PREGNANCY, ANTEPARTUM: Status: RESOLVED | Noted: 2020-06-25 | Resolved: 2020-09-30

## 2020-09-30 LAB
DIRECT EXAM: ABNORMAL
Lab: ABNORMAL
SPECIMEN DESCRIPTION: ABNORMAL

## 2020-09-30 PROCEDURE — 87480 CANDIDA DNA DIR PROBE: CPT

## 2020-09-30 PROCEDURE — 87491 CHLMYD TRACH DNA AMP PROBE: CPT

## 2020-09-30 PROCEDURE — 87660 TRICHOMONAS VAGIN DIR PROBE: CPT

## 2020-09-30 PROCEDURE — 99395 PREV VISIT EST AGE 18-39: CPT | Performed by: NURSE PRACTITIONER

## 2020-09-30 PROCEDURE — 87591 N.GONORRHOEAE DNA AMP PROB: CPT

## 2020-09-30 PROCEDURE — 87624 HPV HI-RISK TYP POOLED RSLT: CPT

## 2020-09-30 PROCEDURE — G0145 SCR C/V CYTO,THINLAYER,RESCR: HCPCS

## 2020-09-30 PROCEDURE — 87510 GARDNER VAG DNA DIR PROBE: CPT

## 2020-09-30 RX ORDER — METRONIDAZOLE 500 MG/1
500 TABLET ORAL 2 TIMES DAILY
Qty: 14 TABLET | Refills: 0 | Status: SHIPPED | OUTPATIENT
Start: 2020-09-30 | End: 2020-10-07

## 2020-09-30 RX ORDER — NORETHINDRONE ACETATE AND ETHINYL ESTRADIOL 1MG-20(21)
1 KIT ORAL DAILY
Qty: 1 PACKET | Refills: 12 | Status: SHIPPED | OUTPATIENT
Start: 2020-09-30 | End: 2021-12-08

## 2020-09-30 ASSESSMENT — PATIENT HEALTH QUESTIONNAIRE - PHQ9
2. FEELING DOWN, DEPRESSED OR HOPELESS: 0
SUM OF ALL RESPONSES TO PHQ QUESTIONS 1-9: 0
SUM OF ALL RESPONSES TO PHQ QUESTIONS 1-9: 0
SUM OF ALL RESPONSES TO PHQ9 QUESTIONS 1 & 2: 0
1. LITTLE INTEREST OR PLEASURE IN DOING THINGS: 0

## 2020-09-30 NOTE — PROGRESS NOTES
History and Physical  [address], [address] [address], [address] [facility] [phone]   Fax [phone]  [facility]  [birth year]              30 y.o. Chief Complaint   Patient presents with    Annual Exam       Patient's last menstrual period was 2020. Primary Care Physician: Thompson Holt M.D., MD    The patient was seen and examined. She has no chief complaint today and is here for her annual exam.  Her bowels are regular. There are no voiding complaints. She denies any bloating. She denies vaginal discharge and was counseled on STD's and the need for barrier contraception. HPI : Kathrine Wang is a 30 y.o. female [id number]    Annual exam  No chief complaint  bottlefeeding her 4 month old daughter  Desires to start on OCPs. Considering paraguard at later date- if side effects with oral contraception. Denies smoking, denies hx of blood clots. Hx chlamydia at end of pregnancy 2020 but was never recultured.   Desires STD testing.  ________________________________________________________________________  OB History    Para Term  AB Living   5 3 3 0 2 3   SAB TAB Ectopic Molar Multiple Live Births   0 2 0 0 0 1      # Outcome Date GA Lbr Henrik/2nd Weight Sex Delivery Anes PTL Lv   5 Term 20 38w5d  8 lb 10.6 oz (3.929 kg) F CS-LTranv Spinal N BETTY      Name: Ritchie Fleming: 8  Apgar5: 9   4 Term 02/15/09 39w0d 07:00 7 lb 12 oz (3.515 kg) F Vag-Spont None        Name: Leonora Hodgkin   3 Term 06 40w0d 07:00 8 lb 5 oz (3.771 kg) F Vag-Forceps None        Name: Nataliya Huggins   2 TAB            1 TAB              Past Medical History:   Diagnosis Date    Alcohol abuse 2017    Atypical squamous cell changes of undetermined significance (ASCUS) on cervical cytology with positive high risk human papilloma virus (HPV) 16    Chlamydia 10/9/12    Depressive disorder 2013    is doing well now, no Inability: Not on file    Transportation needs     Medical: Not on file     Non-medical: Not on file   Tobacco Use    Smoking status: Never Smoker    Smokeless tobacco: Never Used   Substance and Sexual Activity    Alcohol use: Not Currently     Alcohol/week: 0.0 standard drinks     Comment: Occassional Social    Drug use: No    Sexual activity: Yes     Partners: Male   Lifestyle    Physical activity     Days per week: Not on file     Minutes per session: Not on file    Stress: Not on file   Relationships    Social connections     Talks on phone: Not on file     Gets together: Not on file     Attends Zoroastrianism service: Not on file     Active member of club or organization: Not on file     Attends meetings of clubs or organizations: Not on file     Relationship status: Not on file    Intimate partner violence     Fear of current or ex partner: Not on file     Emotionally abused: Not on file     Physically abused: Not on file     Forced sexual activity: Not on file   Other Topics Concern    Not on file   Social History Narrative    Not on file       MEDICATIONS:  Current Outpatient Medications   Medication Sig Dispense Refill    norethindrone-ethinyl estradiol (1110 Dominique MENCHACA 1/20) 1-20 MG-MCG per tablet Take 1 tablet by mouth daily for 28 days 1 packet 12    ibuprofen (ADVIL;MOTRIN) 800 MG tablet Take 1 tablet by mouth every 8 hours as needed for Pain 30 tablet 0    acetaminophen (TYLENOL) 325 MG tablet Take 650 mg by mouth       No current facility-administered medications for this visit.             ALLERGIES:  Allergies as of 09/30/2020 - Review Complete 09/30/2020   Allergen Reaction Noted    No known allergies      Seasonal  06/06/2013       Symptoms of decreased mood absent  Symptoms of anhedonia absent    **If either question is answered in a  positive fashion then complete the PHQ9 Scoring Evaluation and make the appropriate referral**      Immunization status: stated as current, but no records Hematuria or Nocturia. No Urinary Incontinence or Vaginal Discharge  Musculoskeletal ROS: No Arthralgia, Arthritis,Gout,Osteoporosis or Rheumatism  Neurological ROS: No CVA, Migraines, Epilepsy, Seizure Hx, or Limb Weakness  Dermatological ROS: No Rash, Itching, Hives, Mole Changes or Cancer                                                                                                                                                                                                                                  PHYSICAL Exam:     Constitutional:  Vitals:    09/30/20 0902   BP: 118/74   Site: Left Upper Arm   Position: Sitting   Cuff Size: Medium Adult   Pulse: 78   Temp: 98.4 °F (36.9 °C)   Weight: 216 lb (98 kg)   Height: 5' 5\" (1.651 m)         General Appearance: This  is a well Developed, well Nourished, well groomed female. Her BMI was reviewed. Nutritional decision making was discussed. Skin:  There was a Normal Inspection of the skin without rashes or lesions. There were no rashes. (Papular, Maculopapular, Hives, Pustular, Macular)     There were no lesions (Ulcers, Erythema, Abn. Appearing Nevi)            Lymphatic:  No Lymph Nodes were Palpable in the neck , axilla or groin.  0 # Of Lymph Nodes; Location ; Character [Normal]  [Shotty] [Tender] [Enlarged]     Neck and EENT:  The neck was supple. There were no masses   The thyroid was not enlarged and had no masses. Perrla, EOMI B/L, TMI B/L No Abnormalities. Throat inspected-No exudates or Masses, Nares Patent No Masses        Respiratory: The lungs were auscultated and found to be clear. There were no rales, rhonchi or wheezes. There was a good respiratory effort. Cardiovascular: The heart was in a regular rate and rhythm. . No S3 or S4. There was no murmur appreciated. Location, grade, and radiation are not applicable. Extremities: The patients extremities were without calf tenderness, edema, or varicosities.   There was full range of motion in all four extremities. Pulses in all four extremities were appreciated and are 2/4. Abdomen: The abdomen was soft and non-tender. There were good bowel sounds in all quadrants and there was no guarding, rebound or rigidity. On evaluation there was no evidence of hepatosplenomegaly and there was no costal vertebral adeline tenderness bilaterally. No hernias were appreciated. Abdominal Scars: C/S intact    Psych: The patient had a normal Orientation to: Time, Place, Person, and Situation  There is no Mood / Affect changes    Breast:  (Chest)  normal appearance, no masses or tenderness  Self breast exams were reviewed in detail. Literature was given. Pelvic Exam:  Vulva and vagina appear normal. Bimanual exam reveals normal uterus and adnexa. Rectal Exam:  exam declined by patient          Musculosk:  Normal Gait and station was noted. Digits were evaluated without abnormal findings. Range of motion, stability and strength were evaluated and found to be appropriate for the patients age. ASSESSMENT:      30 y.o. Annual   Diagnosis Orders   1. Well female exam with routine gynecological exam  PAP SMEAR   2. Special screening examination for human papillomavirus (HPV)  PAP SMEAR   3. Hx of chlamydia infection  VAGINITIS DNA PROBE    C.trachomatis N.gonorrhoeae DNA   4.  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    Migraines     Mixed anxiety and depressive disorder     Seasonal allergies     Strep throat 08/2018    Tonsillitis 08/2018         Patient Active Problem List    Diagnosis Date Noted    PLTCS 7/4/20 F Apg 8/9 Wt 8#11 07/04/2020    Patient-requested procedure     38 weeks gestation of pregnancy     Rh+/RI/GBS bacteruria 07/03/2020    Elevated 1hr GTT, normal 3hr GTT 06/25/2020    Plantar fascial fibromatosis 05/18/2020    History of alcohol abuse 01/14/2019    History of cholecystectomy 01/14/2019    Epigastric pain 01/14/2019    Hx of pancreatitis 08/22/2017    Alcohol abuse 02/08/2017    Tinea corporis 02/08/2017    ASCUS with positive high risk HPV cervical 02/26/2016    Atypical squamous cell changes of undetermined significance (ASCUS) on cervical cytology with positive high risk human papilloma virus (HPV) 01/05/2016    Cervical atypism 01/05/2016    HPV in female 01/01/2016    Microscopic hematuria 04/16/2014    Disorder of lung 03/19/2014    Cholelithiasis and cholecystitis without obstruction 03/17/2014    Migraine without aura 09/03/2013    Headache 09/03/2013    Depression/Anxiety 06/06/2013    Overweight 06/06/2013          Hereditary Breast, Ovarian, Colon and Uterine Cancer screening Done. Tobacco & Secondary smoke risks reviewed; instructed on cessation and avoidance      Counseling Completed:  Preventative Health Recommendations and Follow up. The patient was informed of the recommended preventative health recommendations. 1. Annuals every year; Cytology collections per prevailing guidelines. 2. Mammograms begin every year at 35 yo if no abnormalities are found and no family history. 3. Bone density studies every 2-3 years. Begin at 71 yo. If no fracture history or osteoporosis family history. (significant). 4. Colonoscopy begin at 38 yo. Repeat every ten years if negative and no family history. 5. Calcium of 1880-4858 mg/day in split dosing  6. Vitamin D 400-800 IU/day  7.  All other preventative health recommendations will be managed by the patients Primary care physician. Counseling Hormonal Based Birth Control:      The patient was seen and counseled on all forms of birth control both male and female  reversible and non. She is aware that hormonal based birth control may increase her risk of developing a blood clot which may increase her morbidity and or mortality. She was counseled on alternate non hormonal based contraception options. We discussed that smoking and any hormonal based contraception may increase the patients risks of developing these life threatening blood clots. All patients are encouraged to stop smoking at the time of contraceptive counseling. Cessation programs were reviewed. The patient was instructed to use barrier contraception for sexually transmitted disease prevention. The patient was also informed of antibiotics decreasing contraceptive efficacy and the need for barrier contraception from the onset of her antibiotic dosing and through a minimum of thirty days from antibiotic cessation. The life threatening side effect profile was reviewed in detail this includes but is not limited to shortness of breath, chest pain, severe or persistent headaches, or calf pain. If any of these occur the patient has been instructed to stop using her hormonal based contraception, notify the office, and go to the emergency department or call 911. The patient denied any personal history of blood clots in her leg, lung, or heart and denied any family history of stroke, TIA, sudden cardiac death < 36 y.o.,pulmonary embolism, or deep venous thrombosis. PLAN:  Return in about 3 months (around 12/30/2020) for birth control pill follow up. Patient denies any personal or family history of blood clots or sudden cardiac death prior to age 36. Signs hormonal birth control consent form  Prescription for loestrin FE 1/20 sent to pharmacy.   Considering nonhormonal IUD- instructed we do not carry them in our office. Pap smear and vaginal cultures collected. Repeat Annual every 1 year  Cervical Cytology Evaluation begins at 24years old. If Negative Cytology, Follow-up screening per current guidelines. Mammograms every 1 year. If 35 yo and last mammogram was negative. Calcium and Vitamin D dosing reviewed. Colonoscopy screening reviewed as well as onset for bone density testing. Birth control and barrier recommendations discussed. STD counseling and prevention reviewed. Gardisil counseling completed for all patients 10-35 yo. Routine health maintenance per patients PCP. Orders Placed This Encounter   Procedures    VAGINITIS DNA PROBE     Standing Status:   Future     Number of Occurrences:   1     Standing Expiration Date:   2021    C.trachomatis N.gonorrhoeae DNA     Standing Status:   Future     Number of Occurrences:   1     Standing Expiration Date:   10/30/2020    PAP SMEAR     Patient History:    Patient's last menstrual period was 2020. OBGYN Status: Having periods  Past Surgical History:  2020:  SECTION; N/A      Comment:   SECTION/PRIMARY performed by Celi Walker DO at [address] [place] [address] L&D OR  No date: CHOLECYSTECTOMY  2019: FOOT SURGERY; Left      Comment:  Plantar fasciectomy left foot   No date: LAPAROSCOPY  2018: PLANTAR FASCIA SURGERY; Right      Comment:  PLANTAR FASCIOTOMY RIGHT AND PRP INJECTION RIGHT FOOT -                CELLSAVER  2019: PLANTAR FASCIA SURGERY; Left      Comment:  OPEN PLANTAR FASCIECTOMY WITH PRP INJECTION LEFT                performed by Chris Singh DPM at [address] [place] [address]  2018: MS INCISION OF FOOT/TOE FASCIA;  Right      Comment:  PLANTAR FASCIOTOMY RIGHT AND PRP INJECTION RIGHT FOOT -                CELLSAVER performed by Chris Singh DPM at [address] [place] [address]    Problem List       Edg Problems Affecting Cytology    HPV in female    Cervical atypism     Social

## 2020-09-30 NOTE — PATIENT INSTRUCTIONS
Patient Education        Combination Birth Control Pills: Care Instructions  Your Care Instructions     Combination birth control pills are used to prevent pregnancy. They give you a regular dose of the hormones estrogen and progestin. You take a hormone pill every day to prevent pregnancy. Birth control pills come in packs. The most common type has 3 weeks of hormone pills. Some packs have sugar pills (they do not contain any hormones) for the fourth week. During that fourth no-hormone week, you have your period. After the fourth week (28 days), you start a new pack. Some birth control pills are packaged in different ways. For example, some have hormone pills for the fourth week instead of sugar pills. Taking hormones for the entire month causes you to not have periods or to have fewer periods. Others are packaged so that you have a period every 3 months. Your doctor will tell you what type of pills you have. Follow-up care is a key part of your treatment and safety. Be sure to make and go to all appointments, and call your doctor if you are having problems. It's also a good idea to know your test results and keep a list of the medicines you take. How can you care for yourself at home? How do you take the pill? · Follow your doctor's instructions about when to start taking your pills. Use backup birth control, such as a condom, or don't have intercourse for 7 days after you start your pills. · Take your pills every day, at about the same time of day. To help yourself do this, try to take them when you do something else every day, such as brushing your teeth. What if you forget to take a pill? Always read the label for specific instructions, or call your doctor. Here are some basic guidelines:  · If you miss 1 hormone pill, take it as soon as you remember. Ask your doctor if you may need to use a backup birth control method, such as a condom, or not have intercourse.   · If you miss 2 or more hormone pills, take one as soon as you remember you forgot them. Then read the pill label or call your doctor about instructions on how to take your missed pills. Use a backup method of birth control or don't have intercourse for 7 days. Pregnancy is more likely if you miss more than 1 pill. · If you had intercourse, you can use emergency contraception to help prevent pregnancy. The most effective emergency contraception is the copper IUD (inserted by a doctor). You can also get emergency contraceptive pills without a prescription at most drugstores. What else do you need to know? · The pill can have side effects. ? You may have very light or skipped periods. ? You may have bleeding between periods (spotting). This usually decreases after 3 to 4 months. ? You may have mood changes, less interest in sex, or weight gain. · The pill may reduce acne, heavy bleeding and cramping, and symptoms of premenstrual syndrome. · Check with your doctor before you use any other medicines, including over-the-counter medicines, vitamins, herbal products, and supplements. Birth control hormones may not work as well to prevent pregnancy when combined with other medicines. · The pill doesn't protect against sexually transmitted infection (STIs), such as herpes or HIV/AIDS. If you're not sure whether your sex partner might have an STI, use a condom to protect against disease. When should you call for help? Call your doctor now or seek immediate medical care if:  · You have severe belly pain. · You have signs of a blood clot, such as:  ? Pain in your calf, back of the knee, thigh, or groin. ? Redness and swelling in your leg or groin. · You have blurred vision or other problems seeing. · You have a severe headache. · You have severe trouble breathing. Watch closely for changes in your health, and be sure to contact your doctor if:  · You think you might be pregnant. · You think you may be depressed.   · You think you may have been exposed to or have a sexually transmitted infection. Where can you learn more? Go to [email or web address] and sign in to your [employer] account. Enter D945 in the formerly [facility] box to learn more about \"Combination Birth Control Pills: Care Instructions. \"     If you do not have an account, please click on the \"Sign Up Now\" link. Current as of: February 11, 2020               Content Version: 12.5  © 2006-2020 [employer]. Care instructions adapted under license by [facility] ([facility]). If you have questions about a medical condition or this instruction, always ask your healthcare professional. [address] any warranty or liability for your use of this information.

## 2020-09-30 NOTE — TELEPHONE ENCOUNTER
Per [facility] CNP, patient advised of +BV with order for flagyl. Patient voiced an understanding. Medication sent to pharmacy accordingly.

## 2020-09-30 NOTE — TELEPHONE ENCOUNTER
----- Message from NAHUN Walker - CNP sent at 9/30/2020  2:05 PM EDT -----  +BV- flagyl 500mg PO BID x 7 days

## 2020-09-30 NOTE — TELEPHONE ENCOUNTER
----- Message from NAHUN Clifford CNP sent at 9/30/2020  2:05 PM EDT -----  +BV- flagyl 500mg PO BID x 7 days

## 2020-10-06 LAB
HPV SOURCE: NORMAL
HPV, GENOTYPE 16: NORMAL
HPV, GENOTYPE 18: NORMAL
HPV, HIGH RISK OTHER: NORMAL

## 2020-10-07 ENCOUNTER — TELEPHONE (OUTPATIENT)
Dept: OBGYN CLINIC | Age: 35
End: 2020-10-07

## 2020-10-07 NOTE — TELEPHONE ENCOUNTER
----- Message from NAHUN Zuleta CNP sent at 10/7/2020  7:43 AM EDT -----  Unable to process HPV specimen on pap smear- due to possible inhibiting substance. Please have patient return to office for repeat pap smear. No intercourse for 72 hours and make sure patient is not on menses.

## 2020-10-07 NOTE — TELEPHONE ENCOUNTER
Per Guille Armendariz CNP, patient advised of that HPV typing was not ran on cervical cell collection due to inhibiting substance. Patient agreeable to recollection. Patient advised to abstain from intercourse 72 hours prior to appointment and that she should not be on her menses. Patient requesting to schedule later this afternoon with calling back, as she is currently at work.

## 2020-10-09 ENCOUNTER — TELEPHONE (OUTPATIENT)
Dept: OBGYN CLINIC | Age: 35
End: 2020-10-09

## 2020-10-09 LAB — CYTOLOGY REPORT: NORMAL

## 2020-10-09 NOTE — TELEPHONE ENCOUNTER
----- Message from NAHUN Patterson NP sent at 10/9/2020 11:04 AM EDT -----  Pap smear neg  HPV Unable to process HPV specimen on pap smear- due to possible inhibiting substance. Please have patient return to office for repeat pap smear. No intercourse for 72 hours and make sure patient is not on menses.

## 2021-02-03 ENCOUNTER — OFFICE VISIT (OUTPATIENT)
Dept: OBGYN CLINIC | Age: 36
End: 2021-02-03
Payer: COMMERCIAL

## 2021-02-03 ENCOUNTER — HOSPITAL ENCOUNTER (OUTPATIENT)
Age: 36
Setting detail: SPECIMEN
Discharge: HOME | End: 2021-02-03
Payer: COMMERCIAL

## 2021-02-03 VITALS
SYSTOLIC BLOOD PRESSURE: 114 MMHG | TEMPERATURE: 98.1 F | HEIGHT: 65 IN | BODY MASS INDEX: 36.82 KG/M2 | WEIGHT: 221 LBS | DIASTOLIC BLOOD PRESSURE: 72 MMHG

## 2021-02-03 DIAGNOSIS — R87.615 PAP SMEAR OF CERVIX UNSATISFACTORY: ICD-10-CM

## 2021-02-03 DIAGNOSIS — N89.8 VAGINAL ODOR: ICD-10-CM

## 2021-02-03 DIAGNOSIS — R87.615 PAP SMEAR OF CERVIX UNSATISFACTORY: Primary | ICD-10-CM

## 2021-02-03 PROCEDURE — 88175 CYTOPATH C/V AUTO FLUID REDO: CPT

## 2021-02-03 PROCEDURE — 99213 OFFICE O/P EST LOW 20 MIN: CPT | Performed by: NURSE PRACTITIONER

## 2021-02-03 PROCEDURE — 87624 HPV HI-RISK TYP POOLED RSLT: CPT

## 2021-02-03 PROCEDURE — 87070 CULTURE OTHR SPECIMN AEROBIC: CPT

## 2021-02-03 RX ORDER — ESCITALOPRAM OXALATE 10 MG/1
10 TABLET ORAL DAILY
Qty: 30 TABLET | Refills: 0 | Status: SHIPPED | OUTPATIENT
Start: 2021-02-03 | End: 2021-12-08

## 2021-02-03 RX ORDER — METRONIDAZOLE 500 MG/1
500 TABLET ORAL 2 TIMES DAILY
Qty: 14 TABLET | Refills: 0 | Status: SHIPPED | OUTPATIENT
Start: 2021-02-03 | End: 2021-02-10

## 2021-02-03 NOTE — PROGRESS NOTES
use: Not Currently     Alcohol/week: 0.0 standard drinks     Comment: Occassional Social    Drug use: No    Sexual activity: Yes     Partners: Male   Lifestyle    Physical activity     Days per week: Not on file     Minutes per session: Not on file    Stress: Not on file   Relationships    Social connections     Talks on phone: Not on file     Gets together: Not on file     Attends Sabianist service: Not on file     Active member of club or organization: Not on file     Attends meetings of clubs or organizations: Not on file     Relationship status: Not on file    Intimate partner violence     Fear of current or ex partner: Not on file     Emotionally abused: Not on file     Physically abused: Not on file     Forced sexual activity: Not on file   Other Topics Concern    Not on file   Social History Narrative    Not on file         MEDICATIONS:  Current Outpatient Medications   Medication Sig Dispense Refill    escitalopram (LEXAPRO) 10 MG tablet Take 1 tablet by mouth daily 30 tablet 0    metroNIDAZOLE (FLAGYL) 500 MG tablet Take 1 tablet by mouth 2 times daily for 7 days 14 tablet 0    norethindrone-ethinyl estradiol (LOESTRIN FE 1/20) 1-20 MG-MCG per tablet Take 1 tablet by mouth daily for 28 days 1 packet 12    ibuprofen (ADVIL;MOTRIN) 800 MG tablet Take 1 tablet by mouth every 8 hours as needed for Pain 30 tablet 0    acetaminophen (TYLENOL) 325 MG tablet Take 650 mg by mouth       No current facility-administered medications for this visit. ALLERGIES:  Allergies as of 02/03/2021 - Review Complete 09/30/2020   Allergen Reaction Noted    No known allergies      Seasonal  06/06/2013         REVIEW OF SYSTEMS:    yes   A minimum of an eleven point review of systems was completed. Review Of Systems (11 point):  Constitutional: No fever, chills or malaise;  No weight change or fatigue  Head and Eyes: No vision, Headache, Dizziness or trauma in last 12 months  ENT ROS: No hearing, Gardnerella vaginalis. (A)     Direct Exam NEGATIVE for Candida sp. Direct Exam NEGATIVE for Trichomonas vaginalis     Direct Exam       Method of testing is a DNA probe intended for detection and identification of Candida species, Gardnerella vaginalis, and Trichomonas vaginalis nucleic acid in vaginal fluid specimens from patients with symptoms of vaginitis/vaginosis. GYN Cytology   Result Value Ref Range    Cytology Report       INTERPRETATION    Cervical material, (ThinPrep vial, Imaging-assisted review):  Specimen Adequacy:       Satisfactory for evaluation.       - Endocervical/transformation zone component present. Descriptive Diagnosis:       Negative for intraepithelial lesion or malignancy. Comments:       High Risk HPV testing was ordered. Cytotechnologist:   Rhett Sarabia. [facility], [place](ASCP)  **Electronically Signed Out**  ingrid/10/9/2020          Procedure/Addendum  HPV Procedure Report     Date Ordered:     10/1/2020     Status:  Signed Out       Date Complete:     10/1/2020     By: Wilder Montgomery CT(ASCP)       Date Reported:     10/9/2020       INTERPRETATION  Aptima HPV DNA High Risk      HPV Sample     Thin Prep     (Ref Range)  HPV Type 16     Result invalid*          (Not Detected)  HPV Type 18     Result invalid*          (Not Detected)  Other High Risk HPV   Result invalid*          (Not Detected)      *Result is invalid possibly due to inhibiting or interfering  substances. Repeat testing was perfo rmed and confirmed the original  invalid result. Please recollect if clinically indicated. HPV by Nucleic Acid Amplification     This test detects high-risk HPV types (16, 18, 31, 33, 35, 39, 45,  51, 52, 56, 58, 59, 66, and 68) and differentiates HPV 16 and 18  associated with cervical cancer and its precursor lesions. Sensitivity may be affected by specimen collection methods, stage of  infection, and the presence of interfering substances.   Results should  be interpreted in conjunction with other available laboratory and  clinical data. A negative high-risk HPV result does not exclude the  presence of other high-risk HPV types, the possibility of future  cytologic abnormalities, underlying CIN2-3 or cancer. This test is intended for medical purposes only and is not valid for  the evaluation of suspected sexual abuse or for other forensic  purposes. HPV testing should not be used for screening or management  of atypical squamous cells of undetermined significance (ASCUS) in   women under age 24. NOTE: HPV Type 16 and Other for Vaginal specimens only  The specimen submitted for testing did not meet [facility] submission  guidelines. Testing was performed on a specimen that did not meet  validated specimen type requirements. Performance characteristics of  this assay may be affected. Interpret results with caution. Please  refer to the [facility] Test Directory for information on  specimen acceptability:  [email or web address]. Performed By: Debora Jones [address]  [place], [address] [place] [address]  : Rolland Lombard. Vanessa Hodgkins, MD                 Source:  1: Cervical material, (ThinPrep vial, Imaging-assisted review)    Clinical History  Z01.419 Routine gyn exam without abnormal findings  Z11.51 Encounter for screening for HPV  Co-Test:  ThinPrep Pap with high risk HPV testing  History of: HPV in female  History of: Cervical atypism  LMP:  9/13/2020  GYNECOLOGIC CYTOLOGY REPORT    Patient Name: Sabrina Willis [facility] Rec: [id number]  Path Number: [id number]  [address] [place] [address], [place] [address]. [place], [address]  [phone]  Fax: [phone]     HPV, High Risk with Genotyping   Result Value Ref Range    HPV SOURCE . CERVIX     HPV, Genotype 16 Invalid     HPV, High Risk Other Invalid     HPV, Genotype 18 Invalid          Assessment:   Diagnosis Orders   1.  Pap smear of cervix unsatisfactory  PAP SMEAR   2. Vaginal odor  Culture, Genital    metroNIDAZOLE (FLAGYL) 500 MG tablet   3. Postpartum depression  escitalopram (LEXAPRO) 10 MG tablet     Patient Active Problem List    Diagnosis Date Noted    PLTCS 7/4/20 F Apg 8/9 Wt 8#11 07/04/2020    Rh+/RI/GBS bacteruria 07/03/2020    Plantar fascial fibromatosis 05/18/2020    History of alcohol abuse 01/14/2019    History of cholecystectomy 01/14/2019    Epigastric pain 01/14/2019    Hx of pancreatitis 08/22/2017    Alcohol abuse 02/08/2017    Tinea corporis 02/08/2017    ASCUS with positive high risk HPV cervical 02/26/2016    Atypical squamous cell changes of undetermined significance (ASCUS) on cervical cytology with positive high risk human papilloma virus (HPV) 01/05/2016    Cervical atypism 01/05/2016    HPV in female 01/01/2016    Microscopic hematuria 04/16/2014    Disorder of lung 03/19/2014    Cholelithiasis and cholecystitis without obstruction 03/17/2014    Migraine without aura 09/03/2013    Headache 09/03/2013    Depression/Anxiety 06/06/2013    Overweight 06/06/2013           PLAN:  Return in about 2 weeks (around 2/17/2021) for PP depression follow up . Pap smear and genital culture collected. Prescription for flagyl sent pending culture results. Prescription for lexapro sent. [facility]erral to [facility] for [facility]. Patient instructed to go to ER with worsening symptoms. Follow up in 2 weeks. Requesting note to be off work for next 2 weeks until follow up for depression. Note given. Repeat Annual every 1 year  Cervical Cytology Evaluation begins at 24years old. If Negative Cytology, Follow-up screening per current guidelines. Return to the office in 2 weeks. Counseled on preventative health maintenance follow-up.   Orders Placed This Encounter   Procedures    Culture, Genital     Standing Status:   Future     Standing Expiration Date:   2/3/2022    PAP SMEAR     Patient History:    No LMP recorded. OBGYN Status: Having periods  Past Surgical History:  2020:  SECTION; N/A      Comment:   SECTION/PRIMARY performed by Ling Ram DO at [address] [place] [address] L&D OR  No date: CHOLECYSTECTOMY  2019: FOOT SURGERY; Left      Comment:  Plantar fasciectomy left foot   No date: LAPAROSCOPY  2018: PLANTAR FASCIA SURGERY; Right      Comment:  PLANTAR FASCIOTOMY RIGHT AND PRP INJECTION RIGHT FOOT -                CELLSAVER  2019: PLANTAR FASCIA SURGERY; Left      Comment:  OPEN PLANTAR FASCIECTOMY WITH PRP INJECTION LEFT                performed by Juan Hawthorne DPM at [address] [place] [address]  2018: [place] INCISION OF FOOT/TOE FASCIA; Right      Comment:  PLANTAR FASCIOTOMY RIGHT AND PRP INJECTION RIGHT FOOT -                CELLSAVER performed by Juan Hawthorne DPM at [address] [place] [address]  Medications/Contraceptives Affecting Cytology     Combination Contraceptives - Oral Disp Start End     norethindrone-ethinyl estradiol ([address] MENCHACA ) 1-20 MG-MCG per   tablet    1 packet 2020 10/28/2020    Sig: Take 1 tablet by mouth daily for 28 days    Route: Oral     Problem List       Edg Problems Affecting Cytology    HPV in female    Cervical atypism     Social History    Tobacco Use      Smoking status: Never Smoker      Smokeless tobacco: Never Used       Standing Status:   Future     Standing Expiration Date:   2021     Order Specific Question:   Collection Type     Answer: Thin Prep     Order Specific Question:   Prior Abnormal Pap Test     Answer:   No     Order Specific Question:   Screening or Diagnostic     Answer:   Screening     Order Specific Question:   HPV Requested? Answer:   Yes     Order Specific Question:   High Risk Patient     Answer:   N/A           The patient, Baljinder Burns is a 30 y.o. female, was seen with a total time spent of 20 minutes for the visit on this date of service by the E/M provider.  The time component had both face to face and non face to face time spent in determining the total time component. Counseling and education regarding her diagnosis listed below and her options regarding those diagnoses were also included in determining her time component. Diagnosis Orders   1. Pap smear of cervix unsatisfactory  PAP SMEAR   2. Vaginal odor  Culture, Genital    metroNIDAZOLE (FLAGYL) 500 MG tablet   3. Postpartum depression  escitalopram (LEXAPRO) 10 MG tablet        The patient had her preventative health maintenance recommendations and follow-up reviewed with her at the completion of her visit.

## 2021-02-06 LAB
CULTURE: NORMAL
Lab: NORMAL
SPECIMEN DESCRIPTION: NORMAL

## 2021-02-11 LAB — CYTOLOGY REPORT: NORMAL

## 2021-02-17 ENCOUNTER — OFFICE VISIT (OUTPATIENT)
Dept: OBGYN CLINIC | Age: 36
End: 2021-02-17
Payer: COMMERCIAL

## 2021-02-17 VITALS
HEIGHT: 65 IN | WEIGHT: 220 LBS | SYSTOLIC BLOOD PRESSURE: 124 MMHG | BODY MASS INDEX: 36.65 KG/M2 | DIASTOLIC BLOOD PRESSURE: 84 MMHG | TEMPERATURE: 98.1 F

## 2021-02-17 PROCEDURE — 99213 OFFICE O/P EST LOW 20 MIN: CPT | Performed by: NURSE PRACTITIONER

## 2021-02-17 RX ORDER — ESCITALOPRAM OXALATE 10 MG/1
10 TABLET ORAL DAILY
Qty: 30 TABLET | Refills: 3 | Status: SHIPPED | OUTPATIENT
Start: 2021-02-17 | End: 2022-07-26

## 2021-02-17 NOTE — PROGRESS NOTES
Becki Leyva      YOB: 1985          The patient was seen today. She is here regarding follow up on PP depression. Currently taking Lexapro 10mg with some relief. Patient experienced nausea at first but has found ways to deal with it- takes at night to decrease symptoms. EPDS score 13- much improved from EPDS score 24. Patient reports not crying very much, not sleeping as much. Able to deal with her children without being short tempered. Patient has not started counseling yet. Encouraged to start counseling through  [address] [place] [address] or patient requesting to go through workplace. Patient requesting to be off work 2 more weeks for symptoms -not completely resolved. April Coats Her bowels are regular and she is voiding without difficulty. HPI:  Becki Leyva is a [address] y.o. female [id number]     Follow up on PP depression.       OB History    Para Term  AB Living   5 3 3 0 2 3   SAB TAB Ectopic Molar Multiple Live Births   0 2 0 0 0 1      # Outcome Date GA Lbr Henrik/2nd Weight Sex Delivery Anes PTL Lv   5 Term 20 38w5d  8 lb 10.6 oz (3.929 kg) F CS-LTranv Spinal N BETTY      Name: Stephy Quezada: 8  Apgar5: 9   4 Term 02/15/09 39w0d 07:00 7 lb 12 oz (3.515 kg) F Vag-Spont None        Name: Eleazar Quan   3 Term 06 40w0d 07:00 8 lb 5 oz (3.771 kg) F Vag-Forceps None        Name: Mitzy Merrill   2 TAB            1 TAB                Past Medical History:   Diagnosis Date    Alcohol abuse 2017    Atypical squamous cell changes of undetermined significance (ASCUS) on cervical cytology with positive high risk human papilloma virus (HPV) 16    Chlamydia 10/9/12    Depressive disorder 2013    is doing well now, no longer on medication    Generalized anxiety disorder     History of anxiety     HPV in female 2016    Migraines     Mixed anxiety and depressive disorder     Postpartum depression 2/3/2021    Seasonal allergies     Strep throat 2018    Tonsillitis 2018       Past Surgical History:   Procedure Laterality Date     SECTION N/A 2020     SECTION/PRIMARY performed by Elli Reynoso DO at [facility] L&D OR    CHOLECYSTECTOMY      FOOT SURGERY Left 2019    Plantar fasciectomy left foot     LAPAROSCOPY      PLANTAR FASCIA SURGERY Right 2018    PLANTAR FASCIOTOMY RIGHT AND PRP INJECTION RIGHT FOOT - CELLSAVER    PLANTAR FASCIA SURGERY Left 2019    OPEN PLANTAR FASCIECTOMY WITH PRP INJECTION LEFT performed by Willie Chavez DPM at [address] FOOT/TOE FASCIA Right 2018    PLANTAR FASCIOTOMY RIGHT AND PRP INJECTION RIGHT FOOT - CELLSAVER performed by Willie Chavez DPM at [address] History   Problem Relation Age of Onset    Other Paternal Grandfather         sepsis    Diabetes Paternal Grandfather     Kidney Disease Paternal Grandfather         had transplant that caused sepsis    Cancer Paternal Grandmother         COLON    Hypertension Paternal Grandmother     Hypertension Father     No Known Problems Mother     Bipolar Disorder Paternal Uncle     Breast Cancer Neg Hx     Colon Cancer Neg Hx     Eclampsia Neg Hx     Ovarian Cancer Neg Hx      Labor Neg Hx     Spont Abortions Neg Hx     Stroke Neg Hx     Heart Attack Neg Hx        Social History     Socioeconomic History    Marital status: Single     Spouse name: Not on file    Number of children: Not on file    Years of education: Not on file    Highest education level: Not on file   Occupational History    Not on file   Social Needs    Financial resource strain: Not on file    Food insecurity     Worry: Not on file     Inability: Not on file    Transportation needs     Medical: Not on file     Non-medical: Not on file   Tobacco Use    Smoking status: Never Smoker    Smokeless tobacco: Never Used   Substance and Sexual Activity    Alcohol use: Not Currently Alcohol/week: 0.0 standard drinks     Comment: Occassional Social    Drug use: No    Sexual activity: Yes     Partners: Male   Lifestyle    Physical activity     Days per week: Not on file     Minutes per session: Not on file    Stress: Not on file   Relationships    Social connections     Talks on phone: Not on file     Gets together: Not on file     Attends Oriental orthodox service: Not on file     Active member of club or organization: Not on file     Attends meetings of clubs or organizations: Not on file     Relationship status: Not on file    Intimate partner violence     Fear of current or ex partner: Not on file     Emotionally abused: Not on file     Physically abused: Not on file     Forced sexual activity: Not on file   Other Topics Concern    Not on file   Social History Narrative    Not on file         MEDICATIONS:  Current Outpatient Medications   Medication Sig Dispense Refill    escitalopram (LEXAPRO) 10 MG tablet Take 1 tablet by mouth daily 30 tablet 3    escitalopram (LEXAPRO) 10 MG tablet Take 1 tablet by mouth daily 30 tablet 0    norethindrone-ethinyl estradiol (LOESTRIN FE 1/20) 1-20 MG-MCG per tablet Take 1 tablet by mouth daily for 28 days 1 packet 12    ibuprofen (ADVIL;MOTRIN) 800 MG tablet Take 1 tablet by mouth every 8 hours as needed for Pain 30 tablet 0    acetaminophen (TYLENOL) 325 MG tablet Take 650 mg by mouth       No current facility-administered medications for this visit. ALLERGIES:  Allergies as of 02/17/2021 - Review Complete 02/17/2021   Allergen Reaction Noted    No known allergies      Seasonal  06/06/2013         REVIEW OF SYSTEMS:    yes   A minimum of an eleven point review of systems was completed. Review Of Systems (11 point):  Constitutional: No fever, chills or malaise;  No weight change or fatigue  Head and Eyes: No vision, Headache, Dizziness or trauma in last 12 months  ENT ROS: No hearing, Tinnitis, sinus or taste problems  Hematological and Lymphatic ROS:No Lymphoma, Von Willebrand's, Hemophillia or Bleeding History  Psych ROS: No Depression, Homicidal thoughts,suicidal thoughts, or anxiety  Breast ROS: No prior breast abnormalities or lumps  Respiratory ROS: No SOB, Pneumoniae,Cough, or Pulmonary Embolism History  Cardiovascular ROS: No Chest Pain with Exertion, Palpitations, Syncope, Edema, Arrhythmia  Gastrointestinal ROS: No Indigestion, Heartburn, Nausea, vomiting, Diarrhea, Constipation,or Bowel Changes; No Bloody Stools or melena  Genito-Urinary ROS: No Dysuria, Hematuria or Nocturia. No Urinary Incontinence or Vaginal Discharge  Musculoskeletal ROS: No Arthralgia, Arthritis,Gout,Osteoporosis or Rheumatism  Neurological ROS: No CVA, Migraines, Epilepsy, Seizure Hx, or Limb Weakness  Dermatological ROS: No Rash, Itching, Hives, Mole Changes or Cancer          Blood pressure 124/84, temperature 98.1 °F (36.7 °C), height 5' 5\" (1.651 m), weight 220 lb (99.8 kg), last menstrual period 01/26/2021, not currently breastfeeding. Chaperone for Intimate Exam  Chaperone was NA      Abdomen: Soft non-tender; good bowel sounds. No guarding, rebound or rigidity. No CVA tenderness bilaterally. Extremities: No calf tenderness, DTR 2/4, and No edema bilaterally    Pelvic: Exam deferred. Diagnostics:  No results found. Lab Results:  Results for orders placed or performed during the hospital encounter of 02/03/21   Culture, Genital    Specimen: Vaginal; Genital   Result Value Ref Range    Specimen Description . VAGINA     Special Requests NOT REPORTED     Culture NORMAL URO-GENITAL KYE     Culture NEGATIVE FOR NEISSERIA GONORRHOEAE     Culture NEGATIVE FOR GROUP B STREPTOCOCCI    GYN Cytology   Result Value Ref Range    Cytology Report       INTERPRETATION    Cervical material, (ThinPrep vial, Imaging-assisted review):  Specimen Adequacy:       Satisfactory for evaluation.       - Endocervical/transformation zone component present.   Descriptive Diagnosis:       Negative for intraepithelial lesion or malignancy. Comments:       High Risk HPV testing was ordered. Cytotechnologist:   Tony TUCKER(ASCP)  **Electronically Signed Out**  ey/2/11/2021          Procedure/Addendum  HPV Procedure Report     Date Ordered:     2/4/2021     Status: Signed  Out       Date Complete:     2/4/2021     By: Michael Hernandez CT(ASCP)       Date Reported:     2/11/2021       INTERPRETATION  Roche HPV DNA High Risk                                  HPV Sample               Thin Prep                    (Ref Range)  HPV Type 16               Not Detected                    (Not  Detected)  HPV Type 18               Not Detected                    (Not  Detected)  Other High Risk HPV     Not Detected                    (Not Detected)       This te st amplifies and detects DNA of 14 high-risk HPV types  associated with cervical cancer and its precursor lesions (HPV types  16, 18, 31, 33, 35, 39, 45, 51, 52, 56, 58, 59, 66, and 68). Sensitivity may be affected by specimen collection methods, stage of  infection, and the presence of interfering substances. Results should  be interpreted in conjunction with other available laboratory and  clinical data. A negative high-risk HPV result does not exclude the  possibility of future cytologic HSIL or underlying CIN2-3 or cancer. This test is intended for medical purposes only and is not valid for  the evaluation of suspected sexual abuse or for other forensic  purposes. Source:  1: Cervical material, (ThinPrep vial, Imaging-assisted review)    Clinical History  Contraceptive use  R87.615 Unsatisfactory pap of cervix  History of: HPV  History of: cervical atypism  Co-Test:  ThinPrep Pap with high risk HPV testing    GYNECOLOGIC CYTOLOGY REPORT    Patient Name: Anh Stokes. Med Rec: [id number]  Path Number: [id number]  [address] [facility], [address]  [phone]  Fax: [phone]     Human papillomavirus (HPV) DNA probe thin prep high risk   Result Value Ref Range    Specimen Description . CERVIX     HPV Sample . THIN PREP     HPV, Genotype 16 Not Detected Not Detected    HPV, Genotype 18 Not Detected Not Detected    HPV, High Risk Other Not Detected Not Detected    HPV, Interpretation               Assessment:   Diagnosis Orders   1. Postpartum depression       Patient Active Problem List    Diagnosis Date Noted    Postpartum depression 02/03/2021     2/3/2021 started on Lexapro      PLTCS 7/4/20 F Apg 8/9 Wt 8#11 07/04/2020    Rh+/RI/GBS bacteruria 07/03/2020    Plantar fascial fibromatosis 05/18/2020    History of alcohol abuse 01/14/2019    History of cholecystectomy 01/14/2019    Epigastric pain 01/14/2019    Hx of pancreatitis 08/22/2017    Alcohol abuse 02/08/2017    Tinea corporis 02/08/2017    ASCUS with positive high risk HPV cervical 02/26/2016    Atypical squamous cell changes of undetermined significance (ASCUS) on cervical cytology with positive high risk human papilloma virus (HPV) 01/05/2016    Cervical atypism 01/05/2016    HPV in female 01/01/2016    Microscopic hematuria 04/16/2014    Disorder of lung 03/19/2014    Cholelithiasis and cholecystitis without obstruction 03/17/2014    Migraine without aura 09/03/2013    Headache 09/03/2013    Depression/Anxiety 06/06/2013    Overweight 06/06/2013           PLAN:  Return if symptoms worsen or fail to improve. Prescription for Lexapro sent to pharmacy. Encouraged to seek counseling- referral made to [facility]. Note for work to excuse for 2 weeks. Repeat Annual every 1 year  Cervical Cytology Evaluation begins at 24years old. If Negative Cytology, Follow-up screening per current guidelines. Return to the office in PRN weeks. Counseled on preventative health maintenance follow-up.   No orders of the defined types were placed in this

## 2021-02-22 ENCOUNTER — TELEPHONE (OUTPATIENT)
Dept: OBGYN CLINIC | Age: 36
End: 2021-02-22

## 2021-02-22 NOTE — TELEPHONE ENCOUNTER
Patient contacted related to short term disability paperwork. Patient was provided a work note on Feb 17, 2021 extending time off through March 8, 2021. Beyond that, further paperwork needs to come from a psychologist/psychiatrist for off of work related to depression. Patient previously referred to services, but patient did not follow up as advised. Patient agreeable, stating she did not need disability paperwork at this time. Patient encouraged to call office. Work note provided on 2/17/21 for patient to return to work 3/8/21.

## 2021-03-02 ENCOUNTER — TELEPHONE (OUTPATIENT)
Dept: OBGYN CLINIC | Age: 36
End: 2021-03-02

## 2021-05-24 ENCOUNTER — OFFICE VISIT (OUTPATIENT)
Dept: OBGYN CLINIC | Age: 36
End: 2021-05-24
Payer: COMMERCIAL

## 2021-05-24 VITALS
SYSTOLIC BLOOD PRESSURE: 132 MMHG | WEIGHT: 227 LBS | BODY MASS INDEX: 37.82 KG/M2 | DIASTOLIC BLOOD PRESSURE: 88 MMHG | HEIGHT: 65 IN

## 2021-05-24 DIAGNOSIS — N92.6 MISSED MENSES: Primary | ICD-10-CM

## 2021-05-24 LAB
CONTROL: PRESENT
PREGNANCY TEST URINE, POC: NEGATIVE

## 2021-05-24 PROCEDURE — 99213 OFFICE O/P EST LOW 20 MIN: CPT | Performed by: NURSE PRACTITIONER

## 2021-05-24 PROCEDURE — 81025 URINE PREGNANCY TEST: CPT | Performed by: NURSE PRACTITIONER

## 2021-05-24 SDOH — ECONOMIC STABILITY: FOOD INSECURITY: WITHIN THE PAST 12 MONTHS, THE FOOD YOU BOUGHT JUST DIDN'T LAST AND YOU DIDN'T HAVE MONEY TO GET MORE.: NEVER TRUE

## 2021-05-24 NOTE — PROGRESS NOTES
Elinor Morgan      YOB: 1985          The patient was seen today. She is here regarding irregular menses after taking Plan B. Had unprotected sex in middle of April and took Plan B the next day on . Started bleeding/spotting for one day on May 5th. Patient has not started period in May. Periods usually occur every 28 days. Concerned she may be pregnant. Took negative home pregnancy test this week. Her bowels are regular and she is voiding without difficulty.      HPI:  Elinor Morgan is a 30 y.o. female [id number]     Missed menses      OB History    Para Term  AB Living   5 3 3 0 2 3   SAB TAB Ectopic Molar Multiple Live Births   0 2 0 0 0 1      # Outcome Date GA Lbr Henrik/2nd Weight Sex Delivery Anes PTL Lv   5 Term 20 38w5d  8 lb 10.6 oz (3.929 kg) F CS-LTranv Spinal N BETTY      Name: Oneil Gretel: 8  Apgar5: 9   4 Term 02/15/09 39w0d 07:00 7 lb 12 oz (3.515 kg) F Vag-Spont None        Name: Phyllistine Eugene   3 Term 06 40w0d 07:00 8 lb 5 oz (3.771 kg) F Vag-Forceps None        Name: Dae Conquest   2 TAB            1 TAB                Past Medical History:   Diagnosis Date    Alcohol abuse 2017    Atypical squamous cell changes of undetermined significance (ASCUS) on cervical cytology with positive high risk human papilloma virus (HPV) 16    Chlamydia 10/9/12    Depressive disorder 2013    is doing well now, no longer on medication    Generalized anxiety disorder     History of anxiety     HPV in female 2016    Migraines     Mixed anxiety and depressive disorder     Postpartum depression 2/3/2021    Seasonal allergies     Strep throat 2018    Tonsillitis 2018       Past Surgical History:   Procedure Laterality Date     SECTION N/A 2020     SECTION/PRIMARY performed by Nicky Schirmer, DO at [facility]    CHOLECYSTECTOMY      FOOT SURGERY Left 2019    Plantar fasciectomy left foot     LAPAROSCOPY      PLANTAR FASCIA SURGERY Right 2018    PLANTAR FASCIOTOMY RIGHT AND PRP INJECTION RIGHT FOOT - CELLSAVER    PLANTAR FASCIA SURGERY Left 2019    OPEN PLANTAR FASCIECTOMY WITH PRP INJECTION LEFT performed by Donna Christensen DPM at [address] FOOT/TOE FASCIA Right 2018    PLANTAR FASCIOTOMY RIGHT AND PRP INJECTION RIGHT FOOT - CELLSAVER performed by Donna Christensen DPM at [address] History   Problem Relation Age of Onset    Other Paternal Grandfather         sepsis    Diabetes Paternal Grandfather     Kidney Disease Paternal Grandfather         had transplant that caused sepsis    Cancer Paternal Grandmother         COLON    Hypertension Paternal Grandmother     Hypertension Father     No Known Problems Mother     Bipolar Disorder Paternal Uncle     Breast Cancer Neg Hx     Colon Cancer Neg Hx     Eclampsia Neg Hx     Ovarian Cancer Neg Hx      Labor Neg Hx     Spont Abortions Neg Hx     Stroke Neg Hx     Heart Attack Neg Hx        Social History     Socioeconomic History    Marital status: Single     Spouse name: Not on file    Number of children: Not on file    Years of education: Not on file    Highest education level: Not on file   Occupational History    Not on file   Tobacco Use    Smoking status: Never Smoker    Smokeless tobacco: Never Used   Vaping Use    Vaping Use: Never used   Substance and Sexual Activity    Alcohol use: Not Currently     Alcohol/week: 0.0 standard drinks     Comment: Occassional Social    Drug use: No    Sexual activity: Yes     Partners: Male   Other Topics Concern    Not on file   Social History Narrative    Not on file     Social Determinants of Health     Financial Resource Strain: Low Risk     Difficulty of Paying Living Expenses: Not hard at all   Food Insecurity: No Food Insecurity    Worried About [address] in the Last Year: Never true    Ran Out of Food in the Last Year: Never true   Transportation Needs:     Lack of Transportation (Medical):  Lack of Transportation (Non-Medical):    Physical Activity:     Days of Exercise per Week:     Minutes of Exercise per Session:    Stress:     Feeling of Stress :    Social Connections:     Frequency of Communication with Friends and Family:     Frequency of Social Gatherings with Friends and Family:     Attends Religion Services:     Active Member of Clubs or Organizations:     Attends Club or Organization Meetings:     Marital Status:    Intimate Partner Violence:     Fear of Current or Ex-Partner:     Emotionally Abused:     Physically Abused:     Sexually Abused:          MEDICATIONS:  Current Outpatient Medications   Medication Sig Dispense Refill    escitalopram (LEXAPRO) 10 MG tablet Take 1 tablet by mouth daily 30 tablet 3    acetaminophen (TYLENOL) 325 MG tablet Take 650 mg by mouth      escitalopram (LEXAPRO) 10 MG tablet Take 1 tablet by mouth daily 30 tablet 0    norethindrone-ethinyl estradiol (LOESTRIN FE 1/20) 1-20 MG-MCG per tablet Take 1 tablet by mouth daily for 28 days 1 packet 12    ibuprofen (ADVIL;MOTRIN) 800 MG tablet Take 1 tablet by mouth every 8 hours as needed for Pain (Patient not taking: Reported on 5/24/2021) 30 tablet 0     No current facility-administered medications for this visit. ALLERGIES:  Allergies as of 05/24/2021 - Fully Reviewed 05/24/2021   Allergen Reaction Noted    No known allergies      Seasonal  06/06/2013         REVIEW OF SYSTEMS:    yes   A minimum of an eleven point review of systems was completed. Review Of Systems (11 point):  Constitutional: No fever, chills or malaise;  No weight change or fatigue  Head and Eyes: No vision, Headache, Dizziness or trauma in last 12 months  ENT ROS: No hearing, Tinnitis, sinus or taste problems  Hematological and Lymphatic ROS:No Lymphoma, Von Willebrand's, Hemophillia or Bleeding History  Psych ROS: No Depression, Homicidal thoughts,suicidal thoughts, or anxiety  Breast ROS: No prior breast abnormalities or lumps  Respiratory ROS: No SOB, Pneumoniae,Cough, or Pulmonary Embolism History  Cardiovascular ROS: No Chest Pain with Exertion, Palpitations, Syncope, Edema, Arrhythmia  Gastrointestinal ROS: No Indigestion, Heartburn, Nausea, vomiting, Diarrhea, Constipation,or Bowel Changes; No Bloody Stools or melena  Genito-Urinary ROS: No Dysuria, Hematuria or Nocturia. No Urinary Incontinence or Vaginal Discharge. + missed menses  Musculoskeletal ROS: No Arthralgia, Arthritis,Gout,Osteoporosis or Rheumatism  Neurological ROS: No CVA, Migraines, Epilepsy, Seizure Hx, or Limb Weakness  Dermatological ROS: No Rash, Itching, Hives, Mole Changes or Cancer          Blood pressure 132/88, height 5' 5\" (1.651 m), weight 227 lb (103 kg), not currently breastfeeding. Chaperone for Intimate Exam  Chaperone was NA       Abdomen: Soft non-tender; good bowel sounds. No guarding, rebound or rigidity. No CVA tenderness bilaterally. Extremities: No calf tenderness, DTR 2/4, and No edema bilaterally    Pelvic: Exam deferred. Diagnostics:  No results found. Lab Results:  Results for orders placed or performed in visit on 05/24/21   POCT urine pregnancy   Result Value Ref Range    Preg Test, Ur Negative     Control Present          Assessment:   Diagnosis Orders   1.  Missed menses  POCT urine pregnancy     Patient Active Problem List    Diagnosis Date Noted    Postpartum depression 02/03/2021     2/3/2021 started on Lexapro      PLTCS 7/4/20 F Apg 8/9 Wt 8#11 07/04/2020    Rh+/RI/GBS bacteruria 07/03/2020    Plantar fascial fibromatosis 05/18/2020    History of alcohol abuse 01/14/2019    History of cholecystectomy 01/14/2019    Epigastric pain 01/14/2019    Hx of pancreatitis 08/22/2017    Alcohol abuse 02/08/2017    Tinea corporis 02/08/2017    ASCUS with positive high risk HPV cervical 02/26/2016    Atypical squamous cell changes of undetermined significance (ASCUS) on cervical cytology with positive high risk human papilloma virus (HPV) 01/05/2016    Cervical atypism 01/05/2016    HPV in female 01/01/2016    Microscopic hematuria 04/16/2014    Disorder of lung 03/19/2014    Cholelithiasis and cholecystitis without obstruction 03/17/2014    Migraine without aura 09/03/2013    Headache 09/03/2013    Depression/Anxiety 06/06/2013    Overweight 06/06/2013           PLAN:  Return if symptoms worsen or fail to improve. Urine pregnancy test negative in office. If no menses in 1-2 weeks, call office for Quant HCG level. Instructed to keep menstrual diary and if continues to have irregular menses, return to office. Repeat Annual every 1 year  Cervical Cytology Evaluation begins at 24years old. If Negative Cytology, Follow-up screening per current guidelines. Return to the office in PRN weeks. Counseled on preventative health maintenance follow-up. Orders Placed This Encounter   Procedures    POCT urine pregnancy           The patient, Jia Anderson is a 30 y.o. female, was seen with a total time spent of 20 minutes for the visit on this date of service by the E/M provider. The time component had both face to face and non face to face time spent in determining the total time component. Counseling and education regarding her diagnosis listed below and her options regarding those diagnoses were also included in determining her time component. Diagnosis Orders   1. Missed menses  POCT urine pregnancy        The patient had her preventative health maintenance recommendations and follow-up reviewed with her at the completion of her visit.

## 2021-12-08 ENCOUNTER — OFFICE VISIT (OUTPATIENT)
Dept: OBGYN CLINIC | Age: 36
End: 2021-12-08
Payer: COMMERCIAL

## 2021-12-08 VITALS
WEIGHT: 219 LBS | DIASTOLIC BLOOD PRESSURE: 70 MMHG | SYSTOLIC BLOOD PRESSURE: 118 MMHG | BODY MASS INDEX: 36.49 KG/M2 | HEIGHT: 65 IN

## 2021-12-08 DIAGNOSIS — N89.8 VAGINAL DISCHARGE: Primary | ICD-10-CM

## 2021-12-08 PROCEDURE — 99213 OFFICE O/P EST LOW 20 MIN: CPT | Performed by: NURSE PRACTITIONER

## 2021-12-08 RX ORDER — CLINDAMYCIN HYDROCHLORIDE 300 MG/1
300 CAPSULE ORAL 2 TIMES DAILY
Qty: 14 CAPSULE | Refills: 0 | Status: SHIPPED | OUTPATIENT
Start: 2021-12-08 | End: 2021-12-15

## 2021-12-08 NOTE — PROGRESS NOTES
Fernie Lima      YOB: 1985          The patient was seen today. She is here regarding vaginal odor and small amount of discharge. Admits to frequent BV infections. Same partner x 1 year. LMP 2021. Her bowels are regular and she is voiding without difficulty.      HPI:  Fernie Lima is a 29 y.o. female [id number]     Vaginal odor and discharge      OB History    Para Term  AB Living   5 3 3 0 2 3   SAB IAB Ectopic Molar Multiple Live Births   0 2 0 0 0 1      # Outcome Date GA Lbr Henrik/2nd Weight Sex Delivery Anes PTL Lv   5 Term 20 38w5d  8 lb 10.6 oz (3.929 kg) F CS-LTranv Spinal N BETTY      Name: Tabitha Marian: 8  Apgar5: 9   4 Term 02/15/09 39w0d 07:00 7 lb 12 oz (3.515 kg) F Vag-Spont None        Name: Tea Graham   3 Term 06 40w0d 07:00 8 lb 5 oz (3.771 kg) F Vag-Forceps None        Name: Felipe Ramón   2 IAB            1 IAB                Past Medical History:   Diagnosis Date    Alcohol abuse 2017    Atypical squamous cell changes of undetermined significance (ASCUS) on cervical cytology with positive high risk human papilloma virus (HPV) 16    Chlamydia 10/9/12    Depressive disorder 2013    is doing well now, no longer on medication    Generalized anxiety disorder     History of anxiety     HPV in female 2016    Migraines     Mixed anxiety and depressive disorder     Postpartum depression 2/3/2021    Seasonal allergies     Strep throat 2018    Tonsillitis 2018       Past Surgical History:   Procedure Laterality Date     SECTION N/A 2020     SECTION/PRIMARY performed by [facility]urd[facility]    CHOLECYSTECTOMY      FOOT SURGERY Left 2019    Plantar fasciectomy left foot     LAPAROSCOPY      PLANTAR FASCIA SURGERY Right 2018    PLANTAR FASCIOTOMY RIGHT AND PRP INJECTION RIGHT FOOT - CELLSAVER    PLANTAR FASCIA SURGERY Left 2019    OPEN PLANTAR FASCIECTOMY WITH PRP INJECTION LEFT performed by Alfreda Gerber DPM at [address] [facility] [address] FOOT/TOE FASCIA Right 2018    PLANTAR FASCIOTOMY RIGHT AND PRP INJECTION RIGHT FOOT - CELLSAVER performed by Alfreda Gerber DPM at [address] History   Problem Relation Age of Onset    Other Paternal Grandfather         sepsis    Diabetes Paternal Grandfather     Kidney Disease Paternal Grandfather         had transplant that caused sepsis    Cancer Paternal Grandmother         COLON    Hypertension Paternal Grandmother     Hypertension Father     No Known Problems Mother     Bipolar Disorder Paternal Uncle     Breast Cancer Neg Hx     Colon Cancer Neg Hx     Eclampsia Neg Hx     Ovarian Cancer Neg Hx      Labor Neg Hx     Spont Abortions Neg Hx     Stroke Neg Hx     Heart Attack Neg Hx        Social History     Socioeconomic History    Marital status: Single     Spouse name: Not on file    Number of children: Not on file    Years of education: Not on file    Highest education level: Not on file   Occupational History    Not on file   Tobacco Use    Smoking status: Never Smoker    Smokeless tobacco: Never Used   Vaping Use    Vaping Use: Never used   Substance and Sexual Activity    Alcohol use: Not Currently     Alcohol/week: 0.0 standard drinks     Comment: Occassional Social    Drug use: No    Sexual activity: Yes     Partners: Male   Other Topics Concern    Not on file   Social History Narrative    Not on file     Social Determinants of Health     Financial Resource Strain: Low Risk     Difficulty of Paying Living Expenses: Not hard at all   Food Insecurity: No Food Insecurity    Worried About Running Out of Food in the Last Year: Never true    [address] in the Last Year: Never true   Transportation Needs:     Lack of Transportation (Medical): Not on file    Lack of Transportation (Non-Medical):  Not on file   Physical Activity:     Days of Exercise per Week: Not on file    Minutes of Exercise per Session: Not on file   Stress:     Feeling of Stress : Not on file   Social Connections:     Frequency of Communication with Friends and Family: Not on file    Frequency of Social Gatherings with Friends and Family: Not on file    Attends Baptism Services: Not on file    Active Member of [address] [place] [address] or Organizations: Not on file    Attends Club or Organization Meetings: Not on file    Marital Status: Not on file   Intimate Partner Violence:     Fear of Current or Ex-Partner: Not on file    Emotionally Abused: Not on file    Physically Abused: Not on file    Sexually Abused: Not on file   Housing Stability:     Unable to Pay for Housing in the Last Year: Not on file    Number of [place] in the Last Year: Not on file    Unstable Housing in the Last Year: Not on file         MEDICATIONS:  Current Outpatient Medications   Medication Sig Dispense Refill    clindamycin (CLEOCIN) 300 MG capsule Take 1 capsule by mouth 2 times daily for 7 days 14 capsule 0    escitalopram (LEXAPRO) 10 MG tablet Take 1 tablet by mouth daily 30 tablet 3     No current facility-administered medications for this visit. ALLERGIES:  Allergies as of 12/08/2021 - Fully Reviewed 12/08/2021   Allergen Reaction Noted    No known allergies      Seasonal  06/06/2013         REVIEW OF SYSTEMS:    yes   A minimum of an eleven point review of systems was completed. Review Of Systems (11 point):  Constitutional: No fever, chills or malaise;  No weight change or fatigue  Head and Eyes: No vision, Headache, Dizziness or trauma in last 12 months  ENT ROS: No hearing, Tinnitis, sinus or taste problems  Hematological and Lymphatic ROS:No Lymphoma, Von Willebrand's, Hemophillia or Bleeding History  Psych ROS: No Depression, Homicidal thoughts,suicidal thoughts, or anxiety  Breast ROS: No prior breast abnormalities or lumps  Respiratory ROS: No SOB, Pneumoniae,Cough, or Pulmonary Embolism History  Cardiovascular ROS: No Chest Pain with Exertion, Palpitations, Syncope, Edema, Arrhythmia  Gastrointestinal ROS: No Indigestion, Heartburn, Nausea, vomiting, Diarrhea, Constipation,or Bowel Changes; No Bloody Stools or melena  Genito-Urinary ROS: No Dysuria, Hematuria or Nocturia. No Urinary Incontinence +Vaginal Discharge/ odor  Musculoskeletal ROS: No Arthralgia, Arthritis,Gout,Osteoporosis or Rheumatism  Neurological ROS: No CVA, Migraines, Epilepsy, Seizure Hx, or Limb Weakness  Dermatological ROS: No Rash, Itching, Hives, Mole Changes or Cancer          Blood pressure 118/70, height 5' 5\" (1.651 m), weight 219 lb (99.3 kg), last menstrual period 11/28/2021, not currently breastfeeding. Chaperone for Intimate Exam   Chaperone was offered and accepted as part of the rooming process.  Chaperone: Noemi         Abdomen: Soft non-tender; good bowel sounds. No guarding, rebound or rigidity. No CVA tenderness bilaterally. Extremities: No calf tenderness, DTR 2/4, and No edema bilaterally    Pelvic: Vulva and vagina appear normal. Bimanual exam reveals normal uterus and adnexa. Diagnostics:  No results found. Lab Results:  Results for orders placed or performed in visit on 05/24/21   POCT urine pregnancy   Result Value Ref Range    Preg Test, Ur Negative     Control Present          Assessment:   Diagnosis Orders   1.  Vaginal discharge  C.trachomatis N.gonorrhoeae DNA    VAGINITIS DNA PROBE    clindamycin (CLEOCIN) 300 MG capsule     Patient Active Problem List    Diagnosis Date Noted    Postpartum depression 02/03/2021     2/3/2021 started on Lexapro      PLTCS 7/4/20 F Apg 8/9 Wt 8#11 07/04/2020    Rh+/RI/GBS bacteruria 07/03/2020    Plantar fascial fibromatosis 05/18/2020    History of alcohol abuse 01/14/2019    History of cholecystectomy 01/14/2019    Epigastric pain 01/14/2019    Hx of pancreatitis 08/22/2017    Alcohol abuse 02/08/2017    Tinea corporis 02/08/2017    ASCUS with positive high risk HPV cervical 02/26/2016    Atypical squamous cell changes of undetermined significance (ASCUS) on cervical cytology with positive high risk human papilloma virus (HPV) 01/05/2016    Cervical atypism 01/05/2016    HPV in female 01/01/2016    Microscopic hematuria 04/16/2014    Disorder of lung 03/19/2014    Cholelithiasis and cholecystitis without obstruction 03/17/2014    Migraine without aura 09/03/2013    Headache 09/03/2013    Depression/Anxiety 06/06/2013    Overweight 06/06/2013           PLAN:  Return if symptoms worsen or fail to improve. Vaginal cultures collected. Prescription for Cleocin sent. Repeat Annual every 1 year  Cervical Cytology Evaluation begins at 24years old. If Negative Cytology, Follow-up screening per current guidelines. Return to the office in PRN weeks. Counseled on preventative health maintenance follow-up. Orders Placed This Encounter   Procedures    C.trachomatis N.gonorrhoeae DNA     Standing Status:   Future     Standing Expiration Date:   12/8/2022    VAGINITIS DNA PROBE     Standing Status:   Future     Standing Expiration Date:   12/8/2022           The patient, Ana Maria Laureano is a 29 y.o. female, was seen with a total time spent of 20 minutes for the visit on this date of service by the E/M provider. The time component had both face to face and non face to face time spent in determining the total time component. Counseling and education regarding her diagnosis listed below and her options regarding those diagnoses were also included in determining her time component. Diagnosis Orders   1. Vaginal discharge  C.trachomatis N.gonorrhoeae DNA    VAGINITIS DNA PROBE    clindamycin (CLEOCIN) 300 MG capsule        The patient had her preventative health maintenance recommendations and follow-up reviewed with her at the completion of her visit.

## 2021-12-16 DIAGNOSIS — N89.8 VAGINAL DISCHARGE: ICD-10-CM

## 2022-07-26 ENCOUNTER — HOSPITAL ENCOUNTER (OUTPATIENT)
Age: 37
Setting detail: SPECIMEN
Discharge: HOME | End: 2022-07-26

## 2022-07-26 ENCOUNTER — OFFICE VISIT (OUTPATIENT)
Dept: OBGYN CLINIC | Age: 37
End: 2022-07-26
Payer: MEDICAID

## 2022-07-26 VITALS
BODY MASS INDEX: 37.99 KG/M2 | WEIGHT: 228 LBS | HEIGHT: 65 IN | DIASTOLIC BLOOD PRESSURE: 76 MMHG | SYSTOLIC BLOOD PRESSURE: 118 MMHG

## 2022-07-26 DIAGNOSIS — R10.2 PELVIC PAIN: ICD-10-CM

## 2022-07-26 DIAGNOSIS — N76.0 ACUTE VAGINITIS: Primary | ICD-10-CM

## 2022-07-26 DIAGNOSIS — N76.0 ACUTE VAGINITIS: ICD-10-CM

## 2022-07-26 PROCEDURE — 99213 OFFICE O/P EST LOW 20 MIN: CPT | Performed by: NURSE PRACTITIONER

## 2022-07-26 RX ORDER — METRONIDAZOLE 7.5 MG/G
GEL VAGINAL
Qty: 1 EACH | Refills: 1 | Status: SHIPPED | OUTPATIENT
Start: 2022-07-26 | End: 2022-08-02

## 2022-07-26 NOTE — PROGRESS NOTES
Sunita Gracia      YOB: 1985          The patient was seen today. She is here regarding STD screening. Recently got back together with her ex. Noticed she has an odor. Reports gets frequent  BV infections. Complaining of random pain near  incision. With movement will get a sharp pain near her old incision site. Happens approximately once a month. Last C/S was 2020. Wants to get started on depo injections. Last intercourse was . Bowels are regular and she is voiding without difficulty.      HPI:  Sunita Gracia is a 29 y.o. female [id number]     STD screening  Pelvic pain near old C/S incision  Desires to start on depo injections      OB History    Para Term  AB Living   5 3 3 0 2 3   SAB IAB Ectopic Molar Multiple Live Births   0 2 0 0 0 1      # Outcome Date GA Lbr Henrik/2nd Weight Sex Delivery Anes PTL Lv   5 Term 20 38w5d  8 lb 10.6 oz (3.929 kg) F CS-LTranv Spinal N BETTY      Name: Mohinder Page: 8  Apgar5: 9   4 Term 02/15/09 39w0d 07:00 7 lb 12 oz (3.515 kg) F Vag-Spont None        Name: Breanna Dudley   3 Term 06 40w0d 07:00 8 lb 5 oz (3.771 kg) F Vag-Forceps None        Name: Feliz Bone   2 IAB            1 IAB                Past Medical History:   Diagnosis Date    Alcohol abuse 2017    Atypical squamous cell changes of undetermined significance (ASCUS) on cervical cytology with positive high risk human papilloma virus (HPV) 16    Chlamydia 10/9/12    Depressive disorder 2013    is doing well now, no longer on medication    Generalized anxiety disorder     History of anxiety     HPV in female 2016    Migraines     Mixed anxiety and depressive disorder     Postpartum depression 2/3/2021    Seasonal allergies     Strep throat 2018    Tonsillitis 2018       Past Surgical History:   Procedure Laterality Date     SECTION N/A 2020     SECTION/PRIMARY performed by Weston Jerry Marlene Gutierrez DO at [address] [facility] L&D OR    CHOLECYSTECTOMY      FOOT SURGERY Left 2019    Plantar fasciectomy left foot     LAPAROSCOPY      PLANTAR FASCIA SURGERY Right 2018    PLANTAR FASCIOTOMY RIGHT AND PRP INJECTION RIGHT FOOT - CELLSAVER    PLANTAR FASCIA SURGERY Left 2019    OPEN PLANTAR FASCIECTOMY WITH PRP INJECTION LEFT performed by Wilbur Zapien DPM at [address] Right 2018    PLANTAR FASCIOTOMY RIGHT AND PRP INJECTION RIGHT FOOT - CELLSAVER performed by Wilbur Zapien DPM at [address] History   Problem Relation Age of Onset    Other Paternal Grandfather         sepsis    Diabetes Paternal Grandfather     Kidney Disease Paternal Grandfather         had transplant that caused sepsis    Cancer Paternal Grandmother         COLON    Hypertension Paternal Grandmother     Hypertension Father     No Known Problems Mother     Bipolar Disorder Paternal Uncle     Breast Cancer Neg Hx     Colon Cancer Neg Hx     Eclampsia Neg Hx     Ovarian Cancer Neg Hx      Labor Neg Hx     Spont Abortions Neg Hx     Stroke Neg Hx     Heart Attack Neg Hx        Social History     Socioeconomic History    Marital status: Single     Spouse name: Not on file    Number of children: Not on file    Years of education: Not on file    Highest education level: Not on file   Occupational History    Not on file   Tobacco Use    Smoking status: Never    Smokeless tobacco: Never   Vaping Use    Vaping Use: Never used   Substance and Sexual Activity    Alcohol use: Not Currently     Alcohol/week: 0.0 standard drinks     Comment: Occassional Social    Drug use: No    Sexual activity: Yes     Partners: Male   Other Topics Concern    Not on file   Social History Narrative    Not on file     Social Determinants of Health     Financial Resource Strain: Not on file   Food Insecurity: Not on file   Transportation Needs: Not on file   Physical Activity: Not on file   Stress: Not on file   Social Connections: Not on file   Intimate Partner Violence: Not on file   Housing Stability: Not on file         MEDICATIONS:  Current Outpatient Medications   Medication Sig Dispense Refill    metroNIDAZOLE (METROGEL VAGINAL) 0.75 % vaginal gel One applicator intravaginally every night for 5 days 1 each 1     No current facility-administered medications for this visit. ALLERGIES:  Allergies as of 07/26/2022 - Fully Reviewed 07/26/2022   Allergen Reaction Noted    No known allergies      Seasonal  06/06/2013         REVIEW OF SYSTEMS:    yes   A minimum of an eleven point review of systems was completed. Review Of Systems (11 point):  Constitutional: No fever, chills or malaise; No weight change or fatigue  Head and Eyes: No vision, Headache, Dizziness or trauma in last 12 months  ENT ROS: No hearing, Tinnitis, sinus or taste problems  Hematological and Lymphatic ROS:No Lymphoma, Von Willebrand's, Hemophillia or Bleeding History  Psych ROS: No Depression, Homicidal thoughts,suicidal thoughts, or anxiety  Breast ROS: No prior breast abnormalities or lumps  Respiratory ROS: No SOB, Pneumoniae,Cough, or Pulmonary Embolism History  Cardiovascular ROS: No Chest Pain with Exertion, Palpitations, Syncope, Edema, Arrhythmia  Gastrointestinal ROS: No Indigestion, Heartburn, Nausea, vomiting, Diarrhea, Constipation,or Bowel Changes; No Bloody Stools or melena  Genito-Urinary ROS: No Dysuria, Hematuria or Nocturia. No Urinary Incontinence or Vaginal Discharge. + pelvic pain near old incision site  Musculoskeletal ROS: No Arthralgia, Arthritis,Gout,Osteoporosis or Rheumatism  Neurological ROS: No CVA, Migraines, Epilepsy, Seizure Hx, or Limb Weakness  Dermatological ROS: No Rash, Itching, Hives, Mole Changes or Cancer          Blood pressure 118/76, height 5' 5\" (1.651 m), weight 228 lb (103.4 kg), last menstrual period 07/05/2022, not currently breastfeeding.     Chaperone for Intimate Exam  Chaperone was offered and accepted as part of the rooming process. Chaperone: Noemi         Abdomen: Soft non-tender; good bowel sounds. No guarding, rebound or rigidity. No CVA tenderness bilaterally. Extremities: No calf tenderness, DTR 2/4, and No edema bilaterally    Pelvic: Vulva and vagina appear normal. Bimanual exam reveals normal uterus and adnexa. Diagnostics:  No results found. Lab Results:  Results for orders placed or performed in visit on 05/24/21   POCT urine pregnancy   Result Value Ref Range    Preg Test, Ur Negative     Control Present          Assessment:   Diagnosis Orders   1. Acute vaginitis  Vaginitis DNA Probe    C.trachomatis N.gonorrhoeae DNA      2. Pelvic pain  HCG, Quantitative, Pregnancy    US PELVIS COMPLETE    US NON OB TRANSVAGINAL        Patient Active Problem List    Diagnosis Date Noted    Postpartum depression 02/03/2021     2/3/2021 started on Lexapro      PLTCS 7/4/20 F Apg 8/9 Wt 8#11 07/04/2020    Rh+/RI/GBS bacteruria 07/03/2020    Plantar fascial fibromatosis 05/18/2020    History of alcohol abuse 01/14/2019    History of cholecystectomy 01/14/2019    Epigastric pain 01/14/2019    Hx of pancreatitis 08/22/2017    Alcohol abuse 02/08/2017    Tinea corporis 02/08/2017    ASCUS with positive high risk HPV cervical 02/26/2016    Atypical squamous cell changes of undetermined significance (ASCUS) on cervical cytology with positive high risk human papilloma virus (HPV) 01/05/2016    Cervical atypism 01/05/2016    HPV in female 01/01/2016    Microscopic hematuria 04/16/2014    Disorder of lung 03/19/2014    Cholelithiasis and cholecystitis without obstruction 03/17/2014    Migraine without aura 09/03/2013    Headache 09/03/2013    Depression/Anxiety 06/06/2013    Overweight 06/06/2013       Counseling Hormonal Based Birth Control:      The patient was seen and counseled on all forms of birth control both male and female  reversible and non.  She is aware that hormonal based birth control may increase her risk of developing a blood clot which may increase her morbidity and or mortality. She was counseled on alternate non hormonal based contraception options. We discussed that smoking and any hormonal based contraception may increase the patients risks of developing these life threatening blood clots. All patients are encouraged to stop smoking at the time of contraceptive counseling. Cessation programs were reviewed. The patient was instructed to use barrier contraception for sexually transmitted disease prevention. The patient was also informed of antibiotics decreasing contraceptive efficacy and the need for barrier contraception from the onset of her antibiotic dosing and through a minimum of thirty days from antibiotic cessation. The life threatening side effect profile was reviewed in detail this includes but is not limited to shortness of breath, chest pain, severe or persistent headaches, or calf pain. If any of these occur the patient has been instructed to stop using her hormonal based contraception, notify the office, and go to the emergency department or call 911. The patient denied any personal history of blood clots in her leg, lung, or heart and denied any family history of stroke, TIA, sudden cardiac death < 36 y.o.,pulmonary embolism, or deep venous thrombosis. PLAN:  Return in about 13 days (around 8/8/2022) for depo injection/ annual exam.  Abstain x 2 weeks. Complete quant prior to next visit. If negative, to receive first dose of depo. Signs Hormone consent form. Pelvic ultrasound ordered. Vaginal cultures collected. Repeat Annual every 1 year  Cervical Cytology Evaluation begins at 24years old. If Negative Cytology, Follow-up screening per current guidelines. Return to the office in 2 weeks. Counseled on preventative health maintenance follow-up.   Orders Placed This Encounter   Procedures    Vaginitis DNA Probe     Standing Status:   Future     Standing Expiration Date:   7/25/2023    C.trachomatis N.gonorrhoeae DNA     Standing Status:   Future     Standing Expiration Date:   7/25/2023    US PELVIS COMPLETE     Standing Status:   Future     Standing Expiration Date:   10/26/2022     Order Specific Question:   Reason for exam:     Answer:   pelvic pain    US NON OB TRANSVAGINAL     Standing Status:   Future     Standing Expiration Date:   1/26/2023     Order Specific Question:   Reason for exam:     Answer:   pelvic pain    HCG, Quantitative, Pregnancy     Standing Status:   Future     Standing Expiration Date:   8/25/2022           The patient, Byron Dai is a 29 y.o. female, was seen with a total time spent of 20 minutes for the visit on this date of service by the E/M provider. The time component had both face to face and non face to face time spent in determining the total time component. Counseling and education regarding her diagnosis listed below and her options regarding those diagnoses were also included in determining her time component. Diagnosis Orders   1. Acute vaginitis  Vaginitis DNA Probe    C.trachomatis N.gonorrhoeae DNA      2. Pelvic pain  HCG, Quantitative, Pregnancy    US PELVIS COMPLETE    US NON OB TRANSVAGINAL           The patient had her preventative health maintenance recommendations and follow-up reviewed with her at the completion of her visit.

## 2022-07-27 ENCOUNTER — TELEPHONE (OUTPATIENT)
Dept: OBGYN CLINIC | Age: 37
End: 2022-07-27

## 2022-07-27 LAB
C TRACH DNA GENITAL QL NAA+PROBE: NEGATIVE
CANDIDA SPECIES, DNA PROBE: NEGATIVE
GARDNERELLA VAGINALIS, DNA PROBE: POSITIVE
N. GONORRHOEAE DNA: NEGATIVE
SOURCE: ABNORMAL
SPECIMEN DESCRIPTION: NORMAL
TRICHOMONAS VAGINALIS DNA: NEGATIVE

## 2022-07-27 NOTE — TELEPHONE ENCOUNTER
----- Message from NAHUN Logan - CNP sent at 7/27/2022  7:53 AM EDT -----  +BV-metrogel 1 applicator everynight X 5 nights- sent script from office yesterday  Make sure patient filled script

## 2022-08-08 ENCOUNTER — HOSPITAL ENCOUNTER (OUTPATIENT)
Age: 37
Discharge: HOME | End: 2022-08-08
Payer: MEDICAID

## 2022-08-08 DIAGNOSIS — R10.2 PELVIC PAIN: ICD-10-CM

## 2022-08-08 LAB — HCG QUANTITATIVE: <1 MIU/ML

## 2022-08-08 PROCEDURE — 36415 COLL VENOUS BLD VENIPUNCTURE: CPT

## 2022-08-08 PROCEDURE — 84702 CHORIONIC GONADOTROPIN TEST: CPT

## 2022-08-09 ENCOUNTER — OFFICE VISIT (OUTPATIENT)
Dept: OBGYN CLINIC | Age: 37
End: 2022-08-09
Payer: MEDICAID

## 2022-08-09 ENCOUNTER — HOSPITAL ENCOUNTER (OUTPATIENT)
Age: 37
Setting detail: SPECIMEN
Discharge: HOME | End: 2022-08-09

## 2022-08-09 VITALS
DIASTOLIC BLOOD PRESSURE: 86 MMHG | HEIGHT: 65 IN | SYSTOLIC BLOOD PRESSURE: 124 MMHG | WEIGHT: 230 LBS | BODY MASS INDEX: 38.32 KG/M2

## 2022-08-09 DIAGNOSIS — Z01.419 WELL FEMALE EXAM WITH ROUTINE GYNECOLOGICAL EXAM: Primary | ICD-10-CM

## 2022-08-09 DIAGNOSIS — Z11.51 SPECIAL SCREENING EXAMINATION FOR HUMAN PAPILLOMAVIRUS (HPV): ICD-10-CM

## 2022-08-09 PROCEDURE — 99214 OFFICE O/P EST MOD 30 MIN: CPT | Performed by: NURSE PRACTITIONER

## 2022-08-09 RX ORDER — MEDROXYPROGESTERONE ACETATE 150 MG/ML
150 INJECTION, SUSPENSION INTRAMUSCULAR ONCE
Status: COMPLETED | OUTPATIENT
Start: 2022-08-09 | End: 2022-08-09

## 2022-08-09 RX ADMIN — MEDROXYPROGESTERONE ACETATE 150 MG: 150 INJECTION, SUSPENSION INTRAMUSCULAR at 12:55

## 2022-08-09 ASSESSMENT — PATIENT HEALTH QUESTIONNAIRE - PHQ9
1. LITTLE INTEREST OR PLEASURE IN DOING THINGS: 0
5. POOR APPETITE OR OVEREATING: 0
8. MOVING OR SPEAKING SO SLOWLY THAT OTHER PEOPLE COULD HAVE NOTICED. OR THE OPPOSITE, BEING SO FIGETY OR RESTLESS THAT YOU HAVE BEEN MOVING AROUND A LOT MORE THAN USUAL: 0
10. IF YOU CHECKED OFF ANY PROBLEMS, HOW DIFFICULT HAVE THESE PROBLEMS MADE IT FOR YOU TO DO YOUR WORK, TAKE CARE OF THINGS AT HOME, OR GET ALONG WITH OTHER PEOPLE: 0
SUM OF ALL RESPONSES TO PHQ9 QUESTIONS 1 & 2: 0
SUM OF ALL RESPONSES TO PHQ QUESTIONS 1-9: 0
3. TROUBLE FALLING OR STAYING ASLEEP: 0
SUM OF ALL RESPONSES TO PHQ QUESTIONS 1-9: 0
6. FEELING BAD ABOUT YOURSELF - OR THAT YOU ARE A FAILURE OR HAVE LET YOURSELF OR YOUR FAMILY DOWN: 0
SUM OF ALL RESPONSES TO PHQ QUESTIONS 1-9: 0
7. TROUBLE CONCENTRATING ON THINGS, SUCH AS READING THE NEWSPAPER OR WATCHING TELEVISION: 0
4. FEELING TIRED OR HAVING LITTLE ENERGY: 0
9. THOUGHTS THAT YOU WOULD BE BETTER OFF DEAD, OR OF HURTING YOURSELF: 0
2. FEELING DOWN, DEPRESSED OR HOPELESS: 0
SUM OF ALL RESPONSES TO PHQ QUESTIONS 1-9: 0

## 2022-08-09 NOTE — PROGRESS NOTES
Chaperone for Intimate Exam  Chaperone was offered and accepted as part of the rooming process.   Chaperone: Alexis Flores CMA

## 2022-08-09 NOTE — PROGRESS NOTES
After obtaining consent, and per orders of [facility] CNP, injection of depo provera 150mg given IM in Right upper quad. gluteus by Ronak Kwok CMA. Patient instructed to remain in clinic for 20 minutes afterwards, and to report any adverse reaction to me immediately.   LOT [id number]  EXP 1/2024  [address] [id number]  RTC 11/1/22 for next depo

## 2022-08-09 NOTE — PROGRESS NOTES
HPV in female 2016    Migraines     Mixed anxiety and depressive disorder     Postpartum depression 2/3/2021    Seasonal allergies     Strep throat 2018    Tonsillitis 2018                                                                   Past Surgical History:   Procedure Laterality Date     SECTION N/A 2020     SECTION/PRIMARY performed by Mary Tavares DO at [facility] L[facility]D OR    CHOLECYSTECTOMY      FOOT SURGERY Left 2019    Plantar fasciectomy left foot     LAPAROSCOPY      PLANTAR FASCIA SURGERY Right 2018    PLANTAR FASCIOTOMY RIGHT AND PRP INJECTION RIGHT FOOT - CELLSAVER    PLANTAR FASCIA SURGERY Left 2019    OPEN PLANTAR FASCIECTOMY WITH PRP INJECTION LEFT performed by Fernando Fernandez DPM at [facility] Right 2018    PLANTAR FASCIOTOMY RIGHT AND PRP INJECTION RIGHT FOOT - CELLSAVER performed by Fernando Fernandez DPM at [address] [place] [address] History   Problem Relation Age of Onset    Other Paternal Grandfather         sepsis    Diabetes Paternal Grandfather     Kidney Disease Paternal Grandfather         had transplant that caused sepsis    Cancer Paternal Grandmother         COLON    Hypertension Paternal Grandmother     Hypertension Father     No Known Problems Mother     Bipolar Disorder Paternal Uncle     Breast Cancer Neg Hx     Colon Cancer Neg Hx     Eclampsia Neg Hx     Ovarian Cancer Neg Hx      Labor Neg Hx     Spont Abortions Neg Hx     Stroke Neg Hx     Heart Attack Neg Hx      Social History     Socioeconomic History    Marital status: Single     Spouse name: Not on file    Number of children: Not on file    Years of education: Not on file    Highest education level: Not on file   Occupational History    Not on file   Tobacco Use    Smoking status: Never    Smokeless tobacco: Never   Vaping Use    Vaping Use: Never used   Substance and Sexual Activity    Alcohol use: Yes     Comment: Jacki Ruiz Drug use: No    Sexual activity: Yes     Partners: Male   Other Topics Concern    Not on file   Social History Narrative    Not on file     Social Determinants of Health     Financial Resource Strain: Not on file   Food Insecurity: Not on file   Transportation Needs: Not on file   Physical Activity: Not on file   Stress: Not on file   Social Connections: Not on file   Intimate Partner Violence: Not on file   Housing Stability: Not on file       MEDICATIONS:  No current outpatient medications on file. Current Facility-Administered Medications   Medication Dose Route Frequency Provider Last Rate Last Admin    medroxyPROGESTERone (DEPO-PROVERA) injection 150 mg  150 mg IntraMUSCular Once NAHUN Deras CNP               ALLERGIES:  Allergies as of 08/09/2022 - Fully Reviewed 08/09/2022   Allergen Reaction Noted    No known allergies      Seasonal  06/06/2013       Symptoms of decreased mood absent  Symptoms of anhedonia absent    **If either question is answered in a  positive fashion then complete the PHQ9 Scoring Evaluation and make the appropriate referral**      Immunization status: stated as current, but no records available. Gynecologic History:  Menarche: 16 yo  Menopause at [address] [place] yo     Patient's last menstrual period was 07/05/2022. Sexually Active: Yes    STD History: Yes chlamydia awhile ago     Permanent Sterilization: No   Reversible Birth Control: Yes to start on depo        Hormone Replacement Exposure: No      Genetic Qualified Family History of Breast, Ovarian , Colon or Uterine Cancer: Yes unsure of what kind- will check with family. If YES see scanned worksheet.     Preventative Health Testing:    Health Maintenance:  Health Maintenance Due   Topic Date Due    COVID-19 Vaccine (1) Never done    Varicella vaccine (1 of 2 - 2-dose childhood series) Never done    Pneumococcal 0-64 years Vaccine (1 - PCV) Never done    Hepatitis C screen  Never done    A1C test (Diabetic or Prediabetic)  06/23/2021    Depression Monitoring  09/30/2021       Date of Last Pap Smear: 2/3/2021 neg/ neg  Abnormal Pap Smear History: yes  Colposcopy History: 2016 colp  Date of Last Mammogram: NA  Date of Last Colonoscopy:   Date of Last Bone Density:      ________________________________________________________________________        REVIEW OF SYSTEMS:    yes   A minimum of an eleven point review of systems was completed. Review Of Systems (11 point):  Constitutional: No fever, chills or malaise; No weight change or fatigue  Head and Eyes: No vision changes, Headache, Dizziness or trauma in last 12 months  ENT ROS: No hearing, Tinnitis, sinus or taste problems  Hematological and Lymphatic ROS:No Lymphoma, Von Willebrand's, Hemophillia or Bleeding History  Psych ROS: No Depression, Homicidal thoughts,suicidal thoughts, or anxiety  Breast ROS: No breast abnormalities or lumps  Respiratory ROS: No SOB, Pneumoniae,Cough, or Pulmonary Embolism   Cardiovascular ROS: No Chest Pain with Exertion, Palpitations, Syncope, Edema, Arrhythmia  Gastrointestinal ROS: No Indigestion, Heartburn, Nausea, vomiting, Diarrhea, Constipation,or Bowel Changes; No Bloody Stools or melena  Genito-Urinary ROS: No Dysuria, Hematuria or Nocturia.  No Urinary Incontinence or Vaginal Discharge  Musculoskeletal ROS: No Arthralgia, Arthritis,Gout,Osteoporosis or Rheumatism  Neurological ROS: No CVA, Migraines, Epilepsy, Seizure Hx, or Limb Weakness  Dermatological ROS: No Rash, Itching, Hives, Mole Changes or Cancer                                                                                                                                                                                                                                  PHYSICAL Exam:     Constitutional:  Vitals:    08/09/22 1049   BP: 124/86   Site: Left Upper Arm   Position: Sitting   Cuff Size: Large Adult   Weight: 230 lb (104.3 kg)   Height: 5' 5\" (1.651 m) Chaperone for Intimate Exam  Chaperone was offered and accepted as part of the rooming process. Chaperone: Harlan Damon          General Appearance: This  is a well Developed, well Nourished, well groomed female. Her BMI was reviewed. Nutritional decision making was discussed. Skin:  There was a Normal Inspection of the skin without rashes or lesions. There were no rashes. (Papular, Maculopapular, Hives, Pustular, Macular)     There were no lesions (Ulcers, Erythema, Abn. Appearing Nevi)            Lymphatic:  No Lymph Nodes were Palpable in the neck , axilla or groin.  0 # Of Lymph Nodes; Location ; Character [Normal]  [Shotty] [Tender] [Enlarged]     Neck and EENT:  The neck was supple. There were no masses   The thyroid was not enlarged and had no masses. Perrla, EOMI B/L, TMI B/L No Abnormalities. Throat inspected-No exudates or Masses, Nares Patent No Masses        Respiratory: The lungs were auscultated and found to be clear. There were no rales, rhonchi or wheezes. There was a good respiratory effort. Cardiovascular: The heart was in a regular rate and rhythm. . No S3 or S4. There was no murmur appreciated. Location, grade, and radiation are not applicable. Extremities: The patients extremities were without calf tenderness, edema, or varicosities. There was full range of motion in all four extremities. Pulses in all four extremities were appreciated and are 2/4. Abdomen: The abdomen was soft and non-tender. There were good bowel sounds in all quadrants and there was no guarding, rebound or rigidity. On evaluation there was no evidence of hepatosplenomegaly and there was no costal vertebral adeline tenderness bilaterally. No hernias were appreciated. Abdominal Scars: C/S scar    Psych:   The patient had a normal Orientation to: Time, Place, Person, and Situation  There is no Mood / Affect changes    Breast:  (Chest)  normal appearance, no masses or tenderness, No nipple retraction or dimpling, No nipple discharge or bleeding, No axillary or supraclavicular adenopathy, Normal to palpation without dominant masses  Self breast exams were reviewed in detail. Literature was given. Pelvic Exam:  External genitalia: normal general appearance  Urinary system: urethral meatus normal  Vaginal: normal mucosa without prolapse or lesions  Cervix: normal appearance  Adnexa: normal bimanual exam  Uterus: normal single, nontender    Rectal Exam:  exam declined by patient          Musculosk:  Normal Gait and station was noted. Digits were evaluated without abnormal findings. Range of motion, stability and strength were evaluated and found to be appropriate for the patients age. ASSESSMENT:      29 y.o. Annual   Diagnosis Orders   1. Well female exam with routine gynecological exam  PAP SMEAR      2.  Special screening examination for human papillomavirus (HPV)  PAP SMEAR             Chief Complaint   Patient presents with    Annual Exam     Last pap 2/3/21 WNL HPV-     Injections     Depo provera HCG 8/8/22 NEGATIVE           Past Medical History:   Diagnosis Date    Alcohol abuse 2/8/2017    Atypical squamous cell changes of undetermined significance (ASCUS) on cervical cytology with positive high risk human papilloma virus (HPV) 1/5/16    Chlamydia 10/9/12    Depressive disorder 06/06/2013    is doing well now, no longer on medication    Generalized anxiety disorder     History of anxiety     HPV in female 1/2016    Migraines     Mixed anxiety and depressive disorder     Postpartum depression 2/3/2021    Seasonal allergies     Strep throat 08/2018    Tonsillitis 08/2018         Patient Active Problem List    Diagnosis Date Noted    Postpartum depression 02/03/2021     2/3/2021 started on Lexapro      PLTCS 7/4/20 F Apg 8/9 Wt 8#11 07/04/2020    Rh+/RI/GBS bacteruria 07/03/2020    Plantar fascial fibromatosis 05/18/2020    History of alcohol abuse 01/14/2019    History of cholecystectomy 01/14/2019    Epigastric pain 01/14/2019    Hx of pancreatitis 08/22/2017    Alcohol abuse 02/08/2017    Tinea corporis 02/08/2017    ASCUS with positive high risk HPV cervical 02/26/2016    Atypical squamous cell changes of undetermined significance (ASCUS) on cervical cytology with positive high risk human papilloma virus (HPV) 01/05/2016    Cervical atypism 01/05/2016    HPV in female 01/01/2016    Microscopic hematuria 04/16/2014    Disorder of lung 03/19/2014    Cholelithiasis and cholecystitis without obstruction 03/17/2014    Migraine without aura 09/03/2013    Headache 09/03/2013    Depression/Anxiety 06/06/2013    Overweight 06/06/2013          Hereditary Breast, Ovarian, Colon and Uterine Cancer screening Done. Tobacco & Secondary smoke risks reviewed; instructed on cessation and avoidance      Counseling Completed:  Preventative Health Recommendations and Follow up. The patient was informed of the recommended preventative health recommendations. 1. Annuals every year; Cytology collections per prevailing guidelines. 2. Mammograms begin every year at 35 yo if no abnormalities are found and no family history. 3. Bone density studies every 2-3 years. Begin at 71 yo. If no fracture history or osteoporosis family history. (significant). 4. Colonoscopy begin at 40 yo. Repeat every ten years if negative and no family history. 5. Calcium of 0929-8636 mg/day in split dosing  6. Vitamin D 400-800 IU/day  7. All other preventative health recommendations will be managed by the patients Primary care physician. Counseling Hormonal Based Birth Control:      The patient was seen and counseled on all forms of birth control both male and female  reversible and non. She is aware that hormonal based birth control may increase her risk of developing a blood clot which may increase her morbidity and or mortality. She was counseled on alternate non hormonal based contraception options.   We discussed that smoking and any hormonal based contraception may increase the patients risks of developing these life threatening blood clots. All patients are encouraged to stop smoking at the time of contraceptive counseling. Cessation programs were reviewed. The patient was instructed to use barrier contraception for sexually transmitted disease prevention. The patient was also informed of antibiotics decreasing contraceptive efficacy and the need for barrier contraception from the onset of her antibiotic dosing and through a minimum of thirty days from antibiotic cessation. The life threatening side effect profile was reviewed in detail this includes but is not limited to shortness of breath, chest pain, severe or persistent headaches, or calf pain. If any of these occur the patient has been instructed to stop using her hormonal based contraception, notify the office, and go to the emergency department or call 911. The patient denied any personal history of blood clots in her leg, lung, or heart and denied any family history of stroke, TIA, sudden cardiac death < 36 y.o.,pulmonary embolism, or deep venous thrombosis. PLAN:  Return in about 12 weeks (around 11/1/2022) for depo. Pap smear obtained. Repeat Annual every 1 year  Cervical Cytology Evaluation begins at 24years old. If Negative Cytology, Follow-up screening per current guidelines. Mammograms every 1 year. If 37 yo and last mammogram was negative. Calcium and Vitamin D dosing reviewed. Colonoscopy screening reviewed as well as onset for bone density testing. Birth control and barrier recommendations discussed. STD counseling and prevention reviewed. Gardisil counseling completed for all patients 10-37 yo. Routine health maintenance per patients PCP. Orders Placed This Encounter   Procedures    PAP SMEAR     Patient History:    No LMP recorded.   OBGYN Status: Having periods  Past Surgical History:  7/4/2020: Alon Mckinney SECTION; N/A      Comment:   SECTION/PRIMARY performed by Corrie Long DO at [facility] L[facility]D OR  No date: CHOLECYSTECTOMY  2019: FOOT SURGERY; Left      Comment:  Plantar fasciectomy left foot   No date: LAPAROSCOPY  2018: PLANTAR FASCIA SURGERY; Right      Comment:  PLANTAR FASCIOTOMY RIGHT AND PRP INJECTION RIGHT FOOT -                CELLSAVER  2019: PLANTAR FASCIA SURGERY; Left      Comment:  OPEN PLANTAR FASCIECTOMY WITH PRP INJECTION LEFT                performed by Cabrera Burch DPM at [address] [place] [address]  2018: DE INCISION OF FOOT/TOE FASCIA; Right      Comment:  PLANTAR FASCIOTOMY RIGHT AND PRP INJECTION RIGHT FOOT -                CELLSAVER performed by Cabrera Burch DPM at [address] [place] [address]    Problem List       Edg Problems Affecting Cytology    HPV in female    Cervical atypism   Social History    Tobacco Use      Smoking status: Never      Smokeless tobacco: Never       Standing Status:   Future     Standing Expiration Date:   2023     Order Specific Question:   Collection Type     Answer: Thin Prep     Order Specific Question:   Prior Abnormal Pap Test     Answer:   No     Order Specific Question:   Screening or Diagnostic     Answer:   Screening     Order Specific Question:   HPV Requested? Answer:   Yes     Order Specific Question:   High Risk Patient     Answer:   N/A           The patient, Byron Dai is a 29 y.o. female, was seen with a total time spent of 20 minutes for the visit on this date of service by the E/M provider. The time component had both face to face and non face to face time spent in determining the total time component. Counseling and education regarding her diagnosis listed below and her options regarding those diagnoses were also included in determining her time component. Diagnosis Orders   1. Well female exam with routine gynecological exam  PAP SMEAR      2.  Special screening examination for human papillomavirus (HPV)  PAP SMEAR           The patient had her preventative health maintenance recommendations and follow-up reviewed with her at the completion of her visit.

## 2022-08-18 LAB — CYTOLOGY REPORT: NORMAL

## 2022-11-01 ENCOUNTER — NURSE ONLY (OUTPATIENT)
Dept: OBGYN CLINIC | Age: 37
End: 2022-11-01
Payer: MEDICAID

## 2022-11-01 VITALS
SYSTOLIC BLOOD PRESSURE: 114 MMHG | HEIGHT: 65 IN | WEIGHT: 232 LBS | DIASTOLIC BLOOD PRESSURE: 70 MMHG | BODY MASS INDEX: 38.65 KG/M2

## 2022-11-01 DIAGNOSIS — N94.6 DYSMENORRHEA: ICD-10-CM

## 2022-11-01 DIAGNOSIS — Z32.02 NEGATIVE PREGNANCY TEST: Primary | ICD-10-CM

## 2022-11-01 LAB
CONTROL: NORMAL
PREGNANCY TEST URINE, POC: NEGATIVE

## 2022-11-01 PROCEDURE — 96372 THER/PROPH/DIAG INJ SC/IM: CPT | Performed by: NURSE PRACTITIONER

## 2022-11-01 PROCEDURE — 81025 URINE PREGNANCY TEST: CPT | Performed by: NURSE PRACTITIONER

## 2022-11-01 RX ORDER — MEDROXYPROGESTERONE ACETATE 150 MG/ML
150 INJECTION, SUSPENSION INTRAMUSCULAR ONCE
Status: COMPLETED | OUTPATIENT
Start: 2022-11-01 | End: 2022-11-01

## 2022-11-01 RX ADMIN — MEDROXYPROGESTERONE ACETATE 150 MG: 150 INJECTION, SUSPENSION INTRAMUSCULAR at 15:49

## 2023-01-12 ENCOUNTER — HOSPITAL ENCOUNTER (EMERGENCY)
Age: 38
Discharge: HOME | End: 2023-01-12
Attending: EMERGENCY MEDICINE
Payer: MEDICAID

## 2023-01-12 VITALS
RESPIRATION RATE: 17 BRPM | BODY MASS INDEX: 41.65 KG/M2 | HEIGHT: 65 IN | DIASTOLIC BLOOD PRESSURE: 92 MMHG | OXYGEN SATURATION: 96 % | HEART RATE: 95 BPM | SYSTOLIC BLOOD PRESSURE: 152 MMHG | TEMPERATURE: 98.6 F | WEIGHT: 250 LBS

## 2023-01-12 DIAGNOSIS — N30.01 ACUTE CYSTITIS WITH HEMATURIA: ICD-10-CM

## 2023-01-12 DIAGNOSIS — K52.9 GASTROENTERITIS: Primary | ICD-10-CM

## 2023-01-12 LAB
ABSOLUTE EOS #: 0 K/UL (ref 0–0.4)
ABSOLUTE IMMATURE GRANULOCYTE: 0 K/UL (ref 0–0.3)
ABSOLUTE LYMPH #: 0.82 K/UL (ref 1–4.8)
ABSOLUTE MONO #: 0.27 K/UL (ref 0.1–0.8)
ALBUMIN SERPL-MCNC: 4.3 G/DL (ref 3.5–5.2)
ALBUMIN/GLOBULIN RATIO: 1.1 (ref 1–2.5)
ALP BLD-CCNC: 74 U/L (ref 35–104)
ALT SERPL-CCNC: 26 U/L (ref 5–33)
ANION GAP SERPL CALCULATED.3IONS-SCNC: 12 MMOL/L (ref 9–17)
AST SERPL-CCNC: 20 U/L
BACTERIA: ABNORMAL
BASOPHILS # BLD: 0 % (ref 0–2)
BASOPHILS ABSOLUTE: 0 K/UL (ref 0–0.2)
BILIRUB SERPL-MCNC: 0.7 MG/DL (ref 0.3–1.2)
BILIRUBIN URINE: NEGATIVE
BUN BLDV-MCNC: 9 MG/DL (ref 6–20)
CALCIUM SERPL-MCNC: 9.4 MG/DL (ref 8.6–10.4)
CASTS UA: ABNORMAL /LPF (ref 0–8)
CHLORIDE BLD-SCNC: 101 MMOL/L (ref 98–107)
CO2: 20 MMOL/L (ref 20–31)
COLOR: YELLOW
CREAT SERPL-MCNC: 0.6 MG/DL (ref 0.5–0.9)
EOSINOPHILS RELATIVE PERCENT: 0 % (ref 1–4)
EPITHELIAL CELLS UA: ABNORMAL /HPF (ref 0–5)
GFR SERPL CREATININE-BSD FRML MDRD: >60 ML/MIN/1.73M2
GLUCOSE BLD-MCNC: 111 MG/DL (ref 70–99)
GLUCOSE URINE: NEGATIVE
HCG QUALITATIVE: NEGATIVE
HCT VFR BLD CALC: 43.8 % (ref 36.3–47.1)
HEMOGLOBIN: 14.4 G/DL (ref 11.9–15.1)
IMMATURE GRANULOCYTES: 0 %
KETONES, URINE: ABNORMAL
LEUKOCYTE ESTERASE, URINE: ABNORMAL
LIPASE: 42 U/L (ref 13–60)
LYMPHOCYTES # BLD: 9 % (ref 24–44)
MCH RBC QN AUTO: 26.5 PG (ref 25.2–33.5)
MCHC RBC AUTO-ENTMCNC: 32.9 G/DL (ref 28.4–34.8)
MCV RBC AUTO: 80.5 FL (ref 82.6–102.9)
MONOCYTES # BLD: 3 % (ref 1–7)
MORPHOLOGY: ABNORMAL
NITRITE, URINE: NEGATIVE
NRBC AUTOMATED: 0 PER 100 WBC
PDW BLD-RTO: 13.7 % (ref 11.8–14.4)
PH UA: 5.5 (ref 5–8)
PLATELET # BLD: 306 K/UL (ref 138–453)
PMV BLD AUTO: 11.3 FL (ref 8.1–13.5)
POTASSIUM SERPL-SCNC: 4.1 MMOL/L (ref 3.7–5.3)
PROTEIN UA: ABNORMAL
RBC # BLD: 5.44 M/UL (ref 3.95–5.11)
RBC UA: ABNORMAL /HPF (ref 0–4)
SEG NEUTROPHILS: 88 % (ref 36–66)
SEGMENTED NEUTROPHILS ABSOLUTE COUNT: 8.01 K/UL (ref 1.8–7.7)
SODIUM BLD-SCNC: 133 MMOL/L (ref 135–144)
SPECIFIC GRAVITY UA: 1.03 (ref 1–1.03)
TOTAL PROTEIN: 8.3 G/DL (ref 6.4–8.3)
TURBIDITY: ABNORMAL
URINE HGB: ABNORMAL
UROBILINOGEN, URINE: NORMAL
WBC # BLD: 9.1 K/UL (ref 3.5–11.3)
WBC UA: ABNORMAL /HPF (ref 0–5)

## 2023-01-12 PROCEDURE — 6370000000 HC RX 637 (ALT 250 FOR IP): Performed by: STUDENT IN AN ORGANIZED HEALTH CARE EDUCATION/TRAINING PROGRAM

## 2023-01-12 PROCEDURE — 96374 THER/PROPH/DIAG INJ IV PUSH: CPT

## 2023-01-12 PROCEDURE — 6360000002 HC RX W HCPCS: Performed by: STUDENT IN AN ORGANIZED HEALTH CARE EDUCATION/TRAINING PROGRAM

## 2023-01-12 PROCEDURE — 84703 CHORIONIC GONADOTROPIN ASSAY: CPT

## 2023-01-12 PROCEDURE — 80053 COMPREHEN METABOLIC PANEL: CPT

## 2023-01-12 PROCEDURE — 2500000003 HC RX 250 WO HCPCS: Performed by: STUDENT IN AN ORGANIZED HEALTH CARE EDUCATION/TRAINING PROGRAM

## 2023-01-12 PROCEDURE — 2580000003 HC RX 258: Performed by: STUDENT IN AN ORGANIZED HEALTH CARE EDUCATION/TRAINING PROGRAM

## 2023-01-12 PROCEDURE — 96375 TX/PRO/DX INJ NEW DRUG ADDON: CPT

## 2023-01-12 PROCEDURE — 85025 COMPLETE CBC W/AUTO DIFF WBC: CPT

## 2023-01-12 PROCEDURE — 83690 ASSAY OF LIPASE: CPT

## 2023-01-12 PROCEDURE — 81001 URINALYSIS AUTO W/SCOPE: CPT

## 2023-01-12 PROCEDURE — 99284 EMERGENCY DEPT VISIT MOD MDM: CPT

## 2023-01-12 RX ORDER — ONDANSETRON 4 MG/1
4 TABLET, FILM COATED ORAL EVERY 8 HOURS PRN
Qty: 20 TABLET | Refills: 0 | Status: SHIPPED | OUTPATIENT
Start: 2023-01-12

## 2023-01-12 RX ORDER — CEPHALEXIN 500 MG/1
500 CAPSULE ORAL 2 TIMES DAILY
Qty: 14 CAPSULE | Refills: 0 | Status: SHIPPED | OUTPATIENT
Start: 2023-01-12 | End: 2023-01-19

## 2023-01-12 RX ORDER — CEPHALEXIN 250 MG/1
500 CAPSULE ORAL ONCE
Status: COMPLETED | OUTPATIENT
Start: 2023-01-12 | End: 2023-01-12

## 2023-01-12 RX ORDER — METOCLOPRAMIDE HYDROCHLORIDE 5 MG/ML
10 INJECTION INTRAMUSCULAR; INTRAVENOUS ONCE
Status: COMPLETED | OUTPATIENT
Start: 2023-01-12 | End: 2023-01-12

## 2023-01-12 RX ORDER — DIPHENHYDRAMINE HYDROCHLORIDE 50 MG/ML
25 INJECTION INTRAMUSCULAR; INTRAVENOUS ONCE
Status: COMPLETED | OUTPATIENT
Start: 2023-01-12 | End: 2023-01-12

## 2023-01-12 RX ORDER — FAMOTIDINE 10 MG/ML
20 INJECTION, SOLUTION INTRAVENOUS ONCE
Status: COMPLETED | OUTPATIENT
Start: 2023-01-12 | End: 2023-01-12

## 2023-01-12 RX ORDER — KETOROLAC TROMETHAMINE 30 MG/ML
30 INJECTION, SOLUTION INTRAMUSCULAR; INTRAVENOUS ONCE
Status: COMPLETED | OUTPATIENT
Start: 2023-01-12 | End: 2023-01-12

## 2023-01-12 RX ORDER — DICYCLOMINE HCL 20 MG
20 TABLET ORAL EVERY 6 HOURS PRN
Qty: 16 TABLET | Refills: 0 | Status: SHIPPED | OUTPATIENT
Start: 2023-01-12 | End: 2023-01-16

## 2023-01-12 RX ORDER — SODIUM CHLORIDE, SODIUM LACTATE, POTASSIUM CHLORIDE, AND CALCIUM CHLORIDE .6; .31; .03; .02 G/100ML; G/100ML; G/100ML; G/100ML
1000 INJECTION, SOLUTION INTRAVENOUS ONCE
Status: COMPLETED | OUTPATIENT
Start: 2023-01-12 | End: 2023-01-12

## 2023-01-12 RX ADMIN — SODIUM CHLORIDE, POTASSIUM CHLORIDE, SODIUM LACTATE AND CALCIUM CHLORIDE 1000 ML: 600; 310; 30; 20 INJECTION, SOLUTION INTRAVENOUS at 21:18

## 2023-01-12 RX ADMIN — CEPHALEXIN 500 MG: 250 CAPSULE ORAL at 22:34

## 2023-01-12 RX ADMIN — DIPHENHYDRAMINE HYDROCHLORIDE 25 MG: 50 INJECTION, SOLUTION INTRAMUSCULAR; INTRAVENOUS at 21:19

## 2023-01-12 RX ADMIN — METOCLOPRAMIDE 10 MG: 5 INJECTION, SOLUTION INTRAMUSCULAR; INTRAVENOUS at 21:19

## 2023-01-12 RX ADMIN — KETOROLAC TROMETHAMINE 30 MG: 30 INJECTION, SOLUTION INTRAMUSCULAR; INTRAVENOUS at 21:19

## 2023-01-12 RX ADMIN — FAMOTIDINE 20 MG: 10 INJECTION, SOLUTION INTRAVENOUS at 21:19

## 2023-01-12 ASSESSMENT — PAIN - FUNCTIONAL ASSESSMENT
PAIN_FUNCTIONAL_ASSESSMENT: NONE - DENIES PAIN
PAIN_FUNCTIONAL_ASSESSMENT: 0-10

## 2023-01-12 ASSESSMENT — ENCOUNTER SYMPTOMS
ABDOMINAL DISTENTION: 0
ANAL BLEEDING: 0
RHINORRHEA: 0
CHEST TIGHTNESS: 0
SORE THROAT: 0
DIARRHEA: 1
SHORTNESS OF BREATH: 0
PHOTOPHOBIA: 0
CONSTIPATION: 0
ABDOMINAL PAIN: 1
NAUSEA: 1
VOMITING: 1

## 2023-01-12 ASSESSMENT — PAIN SCALES - GENERAL: PAINLEVEL_OUTOF10: 10

## 2023-01-13 NOTE — ED NOTES
The following labs were labeled with appropriate pt sticker and tubed to lab:     [] Blue     [x] Lavender   [] on ice  [x] Green/yellow  [] Green/black [] on ice  [] Juliet Reed  [] on ice  [] Yellow  [x] Red  [] Type/ Screen  [] ABG  [] VBG    [] COVID-19 swab    [] Rapid  [] PCR  [] Flu swab  [] Peds Viral Panel     [x] Urine Sample  [] Fecal Sample  [] Pelvic Cultures  [] Blood Cultures  [] X 2  [] STREP Cultures       Abraham Sanchez RN  01/12/23 2125       Abraham Sanchez RN  01/12/23 2125

## 2023-01-13 NOTE — ED NOTES
Patient here c/o nausea, vomiting, diarrhea, and abdominal pain x1 day. Patient states family members in the house recently had \"a stomach bug\". States she hasn't been able to keep much PO intake down today. Denies chest pain, shortness of breath, leg swelling or pain, numbness or tingling. Denies urinary changes. Denies blood in stool or emesis. On arrival, patient ambulatory, A+Ox4, respirations even and unlabored, speaking in complete sentences. Patient tachycardic between 100:110.       Emma Diaz RN  01/12/23 6065

## 2023-01-13 NOTE — ED PROVIDER NOTES
[facility]  ED  Emergency Department Encounter  EmergencyMedicine Resident     Pt Juan Mckeon  MRN: [id number]  [place] 1985  Date of evaluation: 23  PCP:  Marcelo LOCKHART M.D., MD    CHIEF COMPLAINT       Chief Complaint   Patient presents with    Diarrhea     Started today    Nausea       HISTORY OF PRESENT ILLNESS  (Location/Symptom, Timing/Onset, Context/Setting, Quality, Duration, Modifying Factors, Severity.)      Hebert Pineda is a 40 y.o. female who presents with nausea vomiting diarrhea epigastric abdominal tenderness since this morning. States her child was recently sick. Denies any chest pain shortness of breath vaginal bleeding or discharge. Patient is on birth control. Patient does drink etoh fairly heavily    PAST MEDICAL / SURGICAL / SOCIAL / FAMILY HISTORY      has a past medical history of Alcohol abuse, Atypical squamous cell changes of undetermined significance (ASCUS) on cervical cytology with positive high risk human papilloma virus (HPV), Chlamydia, Depressive disorder, Generalized anxiety disorder, History of anxiety, HPV in female, Migraines, Mixed anxiety and depressive disorder, Postpartum depression, Seasonal allergies, Strep throat, and Tonsillitis. has a past surgical history that includes Cholecystectomy; Plantar fascia surgery (Right, 2018); pr fasciotomy foot&/toe (Right, 2018); Foot surgery (Left, 2019); Plantar fascia surgery (Left, 2019); laparoscopy; and  section (N/A, 2020).       Social History     Socioeconomic History    Marital status: Single     Spouse name: Not on file    Number of children: Not on file    Years of education: Not on file    Highest education level: Not on file   Occupational History    Not on file   Tobacco Use    Smoking status: Never    Smokeless tobacco: Never   Vaping Use    Vaping Use: Never used   Substance and Sexual Activity    Alcohol use: Yes     Comment: Occassional Social    Drug use: No    Sexual activity: Yes     Partners: Male   Other Topics Concern    Not on file   Social History Narrative    Not on file     Social Determinants of Health     Financial Resource Strain: Not on file   Food Insecurity: Not on file   Transportation Needs: Not on file   Physical Activity: Not on file   Stress: Not on file   Social Connections: Not on file   Intimate Partner Violence: Not on file   Housing Stability: Not on file       Family History   Problem Relation Age of Onset    Other Paternal Grandfather         sepsis    Diabetes Paternal Grandfather     Kidney Disease Paternal Grandfather         had transplant that caused sepsis    Cancer Paternal Grandmother         COLON    Hypertension Paternal Grandmother     Hypertension Father     No Known Problems Mother     Bipolar Disorder Paternal Uncle     Breast Cancer Neg Hx     Colon Cancer Neg Hx     Eclampsia Neg Hx     Ovarian Cancer Neg Hx      Labor Neg Hx     Spont Abortions Neg Hx     Stroke Neg Hx     Heart Attack Neg Hx        Allergies:  No known allergies and Seasonal    Home Medications:  Prior to Admission medications    Medication Sig Start Date End Date Taking? Authorizing Provider   cephALEXin (KEFLEX) 500 MG capsule Take 1 capsule by mouth 2 times daily for 7 days 23 Yes [employer] Sandra, DO   ondansetron (ZOFRAN) 4 MG tablet Take 1 tablet by mouth every 8 hours as needed for Nausea 23  Yes [employer] Sandra, DO   dicyclomine (BENTYL) 20 MG tablet Take 1 tablet by mouth every 6 hours as needed (abdominal pain) 23 Yes [employer] Sandra, DO       REVIEW OF SYSTEMS    (2-9 systems for level 4, 10 or more for level 5)      Review of Systems   Constitutional:  Negative for chills and fever. HENT:  Negative for rhinorrhea and sore throat. Eyes:  Negative for photophobia. Respiratory:  Negative for chest tightness and shortness of breath.     Cardiovascular:  Negative for chest pain. Gastrointestinal:  Positive for abdominal pain, diarrhea, nausea and vomiting. Negative for abdominal distention, anal bleeding and constipation. Endocrine: Negative for polyuria. Genitourinary:  Negative for difficulty urinating and flank pain. Musculoskeletal:  Negative for arthralgias. Skin:  Negative for rash. Neurological:  Negative for weakness and headaches. PHYSICAL EXAM   (up to 7 for level 4, 8 or more for level 5)      INITIAL VITALS:   BP (!) 152/92   Pulse 95   Temp 98.6 °F (37 °C)   Resp 17   Ht 5' 5\" (1.651 m)   Wt 250 lb (113.4 kg)   SpO2 96%   BMI 41.60 kg/m²     Physical Exam  Constitutional:       General: She is not in acute distress. HENT:      Head: Normocephalic and atraumatic. Nose: Nose normal.      Mouth/Throat:      Mouth: Mucous membranes are moist.   Eyes:      Extraocular Movements: Extraocular movements intact. Pupils: Pupils are equal, round, and reactive to light. Cardiovascular:      Rate and Rhythm: Tachycardia present. Pulses: Normal pulses. Heart sounds: Normal heart sounds. Pulmonary:      Effort: Pulmonary effort is normal.      Breath sounds: Normal breath sounds. Abdominal:      General: There is no distension. Tenderness: There is no right CVA tenderness, left CVA tenderness, guarding or rebound. Comments: Minimal epigastric tenderness   Musculoskeletal:      Right lower leg: No edema. Left lower leg: No edema. Skin:     Capillary Refill: Capillary refill takes less than 2 seconds. Neurological:      General: No focal deficit present.        DIFFERENTIAL  DIAGNOSIS     PLAN (LABS / IMAGING / EKG):  Orders Placed This Encounter   Procedures    CBC with Auto Differential    CMP    Lipase    HCG Qualitative, Serum    Urinalysis with Reflex to Culture    Microscopic Urinalysis       MEDICATIONS ORDERED:  Orders Placed This Encounter   Medications    lactated ringers bolus    metoclopramide (REGLAN) injection 10 mg    diphenhydrAMINE (BENADRYL) injection 25 mg    famotidine (PEPCID) injection 20 mg    ketorolac (TORADOL) injection 30 mg    cephALEXin (KEFLEX) capsule 500 mg     Order Specific Question:   Antimicrobial Indications     Answer:   Urinary Tract Infection    cephALEXin (KEFLEX) 500 MG capsule     Sig: Take 1 capsule by mouth 2 times daily for 7 days     Dispense:  14 capsule     Refill:  0    ondansetron (ZOFRAN) 4 MG tablet     Sig: Take 1 tablet by mouth every 8 hours as needed for Nausea     Dispense:  20 tablet     Refill:  0    dicyclomine (BENTYL) 20 MG tablet     Sig: Take 1 tablet by mouth every 6 hours as needed (abdominal pain)     Dispense:  16 tablet     Refill:  0       DIAGNOSTIC RESULTS / EMERGENCY DEPARTMENT COURSE / MDM   LAB RESULTS:  Results for orders placed or performed during the hospital encounter of 01/12/23   CBC with Auto Differential   Result Value Ref Range    WBC 9.1 3.5 - 11.3 k/uL    RBC 5.44 (H) 3.95 - 5.11 m/uL    Hemoglobin 14.4 11.9 - 15.1 g/dL    Hematocrit 43.8 36.3 - 47.1 %    MCV 80.5 (L) 82.6 - 102.9 fL    MCH 26.5 25.2 - 33.5 pg    MCHC 32.9 28.4 - 34.8 g/dL    RDW 13.7 11.8 - 14.4 %    Platelets 681 736 - 228 k/uL    MPV 11.3 8.1 - 13.5 fL    NRBC Automated 0.0 0.0 per 100 WBC    Immature Granulocytes 0 0 %    Seg Neutrophils 88 (H) 36 - 66 %    Lymphocytes 9 (L) 24 - 44 %    Monocytes 3 1 - 7 %    Eosinophils % 0 (L) 1 - 4 %    Basophils 0 0 - 2 %    Absolute Immature Granulocyte 0.00 0.00 - 0.30 k/uL    Segs Absolute 8.01 (H) 1.8 - 7.7 k/uL    Absolute Lymph # 0.82 (L) 1.0 - 4.8 k/uL    Absolute Mono # 0.27 0.1 - 0.8 k/uL    Absolute Eos # 0.00 0.0 - 0.4 k/uL    Basophils Absolute 0.00 0.0 - 0.2 k/uL    Morphology MICROCYTOSIS PRESENT    CMP   Result Value Ref Range    Glucose 111 (H) 70 - 99 mg/dL    BUN 9 6 - 20 mg/dL    Creatinine 0.60 0.50 - 0.90 mg/dL    Est, Glom Filt Rate >60 >60 mL/min/1.73m2    Calcium 9.4 8.6 - 10.4 mg/dL    Sodium 133 (L) 135 - 144 mmol/L    Potassium 4.1 3.7 - 5.3 mmol/L    Chloride 101 98 - 107 mmol/L    CO2 20 20 - 31 mmol/L    Anion Gap 12 9 - 17 mmol/L    Alkaline Phosphatase 74 35 - 104 U/L    ALT 26 5 - 33 U/L    AST 20 <32 U/L    Total Bilirubin 0.7 0.3 - 1.2 mg/dL    Total Protein 8.3 6.4 - 8.3 g/dL    Albumin 4.3 3.5 - 5.2 g/dL    Albumin/Globulin Ratio 1.1 1.0 - 2.5   Lipase   Result Value Ref Range    Lipase 42 13 - 60 U/L   HCG Qualitative, Serum   Result Value Ref Range    hCG Qual NEGATIVE NEGATIVE   Urinalysis with Reflex to Culture    Specimen: Urine   Result Value Ref Range    Color, UA Yellow Yellow    Turbidity UA Cloudy (A) Clear    Glucose, Ur NEGATIVE NEGATIVE    Bilirubin Urine NEGATIVE NEGATIVE    Ketones, Urine TRACE (A) NEGATIVE    Specific Gravity, UA 1.028 1.005 - 1.030    Urine Hgb MODERATE (A) NEGATIVE    pH, UA 5.5 5.0 - 8.0    Protein, UA TRACE (A) NEGATIVE    Urobilinogen, Urine Normal Normal    Nitrite, Urine NEGATIVE NEGATIVE    Leukocyte Esterase, Urine SMALL (A) NEGATIVE   Microscopic Urinalysis   Result Value Ref Range    WBC, UA 5 TO 10 0 - 5 /HPF    RBC, UA 0 TO 2 0 - 4 /HPF    Casts UA  0 - 8 /LPF     0 TO 2 HYALINE Reference range defined for non-centrifuged specimen. Epithelial Cells UA 20 TO 50 0 - 5 /HPF    Bacteria, UA MODERATE (A) None       RADIOLOGY:  No results found. EKG Interpretation  None    MDM  Medical Decision Making  Patient with likely viral gastroenteritis. Noted to have UTI as well treated for this. Patient nontoxic had symptomatic control. No acute process found on labs or focal abdominal tenderness that would warrant CT imaging. Patient discharged home nontoxic strict return follow-up precautions    Amount and/or Complexity of Data Reviewed  External Data Reviewed: labs and radiology. Labs: ordered. Decision-making details documented in ED Course. Radiology:  Decision-making details documented in ED Course. Risk  Prescription drug management.         EMERGENCY DEPARTMENT COURSE:  ED Course as of 01/13/23 0424   u Jan 12, 2023   2102 Patient with nausea vomit abdominal pain diarrhea. Nontoxic nonperitoneal.  Will give symptomatic treatment given labs and urine [ZE]   2231 Patient with UTI will give Keflex. Symptoms have resolved. Likely related to viral gastroenteritis will discharge [ZE]      ED Course User Index  [ZE] Genaro Holliday DO       PROCEDURES:  Procedures     CONSULTS:  None    CRITICAL CARE:  See MDM    FINAL IMPRESSION      1. Gastroenteritis    2.  Acute cystitis with hematuria          DISPOSITION / PLAN     DISPOSITION Decision To Discharge 01/12/2023 10:33:10 PM      PATIENT REFERRED TO:  [facility]  [address] [address]  [phone]    If symptoms worsen    Natali Nuñez MD  [address]  [phone]    In 2 days      DISCHARGE MEDICATIONS:  Discharge Medication List as of 1/12/2023 11:01 PM        START taking these medications    Details   cephALEXin (KEFLEX) 500 MG capsule Take 1 capsule by mouth 2 times daily for 7 days, Disp-14 capsule, R-0Print      ondansetron (ZOFRAN) 4 MG tablet Take 1 tablet by mouth every 8 hours as needed for Nausea, Disp-20 tablet, R-0Print      dicyclomine (BENTYL) 20 MG tablet Take 1 tablet by mouth every 6 hours as needed (abdominal pain), Disp-16 tablet, R-0Print             Mavis DO Maykel  Emergency Medicine Resident    (Please note that portions of thisnote were completed with a voice recognition program.  Efforts were made to edit the dictations but occasionally words are mis-transcribed.)        Genaro Holliday DO  Resident  01/13/23 9747

## 2023-01-13 NOTE — DISCHARGE INSTRUCTIONS
Take antibiotics as prescribed for UTI. Use Zofran for nausea and Bentyl for abdominal pain. If you develop abdominal pain or worsening of symptoms in any way return to the emergency department or call 911.

## 2023-01-13 NOTE — PROGRESS NOTES
[address] [facility] [address]  PROGRESS NOTE    Shift date: 1.12.2023  Shift day: Thursday   Shift # 2    Room # 91/19   Name: Alise Joseph                Jain: unknown   Place of [facility]: unknown    Referral: Routine Visit    Admit Date & Time: 1/12/2023  8:43 PM    Assessment:  Alise Joseph is a 40 y.o. female in the hospital. Upon entering the room writer observes patient appearing unwell but coping. States that she is ok but needs to use the restroom. Intervention:  Writer introduced self and title as  Writer offered space for the patient  to express feelings, needs, and concerns and provided a ministry presence. Outcome:  Patient remains calm and coping    Plan:  Chaplains will remain available to offer spiritual and emotional support as needed.       Electronically signed by Elina Monroe on 1/12/2023 at 11:05 PM.  [facility]  [phone]       01/12/23 2145   Encounter Summary   Service Provided For: Patient   Referral/Consult From: [facility] Family members   Last Encounter  01/12/23   Complexity of Encounter Moderate   Begin Time 2145   End Time  2200   Total Time Calculated 15 min   Encounter    Type Initial Screen/Assessment   Assessment/Intervention/Outcome   Assessment Calm;Coping   Intervention Active listening   Outcome Expressed feelings, needs, and concerns     Electronically signed by Aditya Coelho on 1/12/2023 at 11:06 PM

## 2023-01-26 ENCOUNTER — NURSE ONLY (OUTPATIENT)
Dept: OBGYN CLINIC | Age: 38
End: 2023-01-26
Payer: MEDICAID

## 2023-01-26 VITALS
DIASTOLIC BLOOD PRESSURE: 74 MMHG | WEIGHT: 231 LBS | HEIGHT: 65 IN | SYSTOLIC BLOOD PRESSURE: 116 MMHG | BODY MASS INDEX: 38.49 KG/M2

## 2023-01-26 DIAGNOSIS — N94.6 DYSMENORRHEA: Primary | ICD-10-CM

## 2023-01-26 DIAGNOSIS — Z32.02 NEGATIVE PREGNANCY TEST: ICD-10-CM

## 2023-01-26 LAB
CONTROL: NORMAL
PREGNANCY TEST URINE, POC: NEGATIVE

## 2023-01-26 PROCEDURE — 96372 THER/PROPH/DIAG INJ SC/IM: CPT | Performed by: NURSE PRACTITIONER

## 2023-01-26 PROCEDURE — 81025 URINE PREGNANCY TEST: CPT | Performed by: NURSE PRACTITIONER

## 2023-01-26 RX ORDER — MEDROXYPROGESTERONE ACETATE 150 MG/ML
150 INJECTION, SUSPENSION INTRAMUSCULAR ONCE
Status: COMPLETED | OUTPATIENT
Start: 2023-01-26 | End: 2023-01-26

## 2023-01-26 RX ADMIN — MEDROXYPROGESTERONE ACETATE 150 MG: 150 INJECTION, SUSPENSION INTRAMUSCULAR at 12:32

## 2023-04-25 ENCOUNTER — NURSE ONLY (OUTPATIENT)
Dept: OBGYN CLINIC | Age: 38
End: 2023-04-25
Payer: MEDICAID

## 2023-04-25 VITALS
WEIGHT: 227 LBS | DIASTOLIC BLOOD PRESSURE: 80 MMHG | SYSTOLIC BLOOD PRESSURE: 110 MMHG | BODY MASS INDEX: 37.82 KG/M2 | HEIGHT: 65 IN

## 2023-04-25 DIAGNOSIS — Z32.02 NEGATIVE PREGNANCY TEST: ICD-10-CM

## 2023-04-25 DIAGNOSIS — N94.6 DYSMENORRHEA: Primary | ICD-10-CM

## 2023-04-25 LAB
CONTROL: NORMAL
PREGNANCY TEST URINE, POC: NEGATIVE

## 2023-04-25 PROCEDURE — 81025 URINE PREGNANCY TEST: CPT | Performed by: NURSE PRACTITIONER

## 2023-04-25 PROCEDURE — 99999 PR OFFICE/OUTPT VISIT,PROCEDURE ONLY: CPT | Performed by: NURSE PRACTITIONER

## 2023-04-25 RX ORDER — MEDROXYPROGESTERONE ACETATE 150 MG/ML
150 INJECTION, SUSPENSION INTRAMUSCULAR ONCE
Status: COMPLETED | OUTPATIENT
Start: 2023-04-25 | End: 2023-04-25

## 2023-04-25 RX ORDER — MEDROXYPROGESTERONE ACETATE 150 MG/ML
150 INJECTION, SUSPENSION INTRAMUSCULAR
COMMUNITY

## 2023-04-25 RX ADMIN — MEDROXYPROGESTERONE ACETATE 150 MG: 150 INJECTION, SUSPENSION INTRAMUSCULAR at 09:42

## 2023-04-25 NOTE — PROGRESS NOTES
Per Carlos Kimble, depo provera given in her left delt. LOT [id number] exp 5/2024. upt negative. To return in 12 weeks for next injection. See MAR for further injection information.

## 2023-07-24 DIAGNOSIS — Z30.09 FAMILY PLANNING: Primary | ICD-10-CM

## 2023-07-26 ENCOUNTER — HOSPITAL ENCOUNTER (OUTPATIENT)
Age: 38
Discharge: HOME | End: 2023-07-26
Payer: MEDICAID

## 2023-07-26 DIAGNOSIS — Z30.09 FAMILY PLANNING: ICD-10-CM

## 2023-07-26 LAB — B-HCG SERPL EIA 3RD IS-ACNC: <1 MIU/ML

## 2023-07-26 PROCEDURE — 36415 COLL VENOUS BLD VENIPUNCTURE: CPT

## 2023-07-26 PROCEDURE — 84702 CHORIONIC GONADOTROPIN TEST: CPT

## 2023-07-31 ENCOUNTER — NURSE ONLY (OUTPATIENT)
Dept: OBGYN CLINIC | Age: 38
End: 2023-07-31
Payer: MEDICAID

## 2023-07-31 VITALS — DIASTOLIC BLOOD PRESSURE: 70 MMHG | SYSTOLIC BLOOD PRESSURE: 118 MMHG

## 2023-07-31 DIAGNOSIS — Z32.02 NEGATIVE PREGNANCY TEST: Primary | ICD-10-CM

## 2023-07-31 DIAGNOSIS — N94.6 DYSMENORRHEA: ICD-10-CM

## 2023-07-31 PROCEDURE — 99999 PR OFFICE/OUTPT VISIT,PROCEDURE ONLY: CPT | Performed by: NURSE PRACTITIONER

## 2023-07-31 PROCEDURE — 96372 THER/PROPH/DIAG INJ SC/IM: CPT | Performed by: NURSE PRACTITIONER

## 2023-07-31 RX ORDER — MEDROXYPROGESTERONE ACETATE 150 MG/ML
150 INJECTION, SUSPENSION INTRAMUSCULAR ONCE
Status: COMPLETED | OUTPATIENT
Start: 2023-07-31 | End: 2023-07-31

## 2023-07-31 RX ADMIN — MEDROXYPROGESTERONE ACETATE 150 MG: 150 INJECTION, SUSPENSION INTRAMUSCULAR at 14:19

## 2023-07-31 NOTE — PROGRESS NOTES
Per Taft Forte depo provera 150mg given R deltoid, lot [id number]. Negative hcg quant completed 7/26/23.

## 2023-08-02 ENCOUNTER — HOSPITAL ENCOUNTER (EMERGENCY)
Age: 38
Discharge: HOME | End: 2023-08-02
Attending: EMERGENCY MEDICINE
Payer: MEDICAID

## 2023-08-02 VITALS
WEIGHT: 227.74 LBS | SYSTOLIC BLOOD PRESSURE: 124 MMHG | BODY MASS INDEX: 37.94 KG/M2 | DIASTOLIC BLOOD PRESSURE: 91 MMHG | TEMPERATURE: 98.3 F | OXYGEN SATURATION: 97 % | RESPIRATION RATE: 17 BRPM | HEIGHT: 65 IN | HEART RATE: 98 BPM
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
## 2023-08-02 DIAGNOSIS — R51.9 NONINTRACTABLE HEADACHE, UNSPECIFIED CHRONICITY PATTERN, UNSPECIFIED HEADACHE TYPE: Primary | ICD-10-CM

## 2023-08-02 PROCEDURE — 6360000002 HC RX W HCPCS: Performed by: STUDENT IN AN ORGANIZED HEALTH CARE EDUCATION/TRAINING PROGRAM

## 2023-08-02 PROCEDURE — 99284 EMERGENCY DEPT VISIT MOD MDM: CPT

## 2023-08-02 PROCEDURE — 2580000003 HC RX 258: Performed by: STUDENT IN AN ORGANIZED HEALTH CARE EDUCATION/TRAINING PROGRAM

## 2023-08-02 PROCEDURE — 96361 HYDRATE IV INFUSION ADD-ON: CPT

## 2023-08-02 PROCEDURE — 6370000000 HC RX 637 (ALT 250 FOR IP): Performed by: STUDENT IN AN ORGANIZED HEALTH CARE EDUCATION/TRAINING PROGRAM

## 2023-08-02 PROCEDURE — 96374 THER/PROPH/DIAG INJ IV PUSH: CPT

## 2023-08-02 PROCEDURE — 96375 TX/PRO/DX INJ NEW DRUG ADDON: CPT

## 2023-08-02 RX ORDER — DIPHENHYDRAMINE HYDROCHLORIDE 50 MG/ML
25 INJECTION INTRAMUSCULAR; INTRAVENOUS ONCE
Status: COMPLETED | OUTPATIENT
Start: 2023-08-02 | End: 2023-08-02

## 2023-08-02 RX ORDER — PROCHLORPERAZINE EDISYLATE 5 MG/ML
10 INJECTION INTRAMUSCULAR; INTRAVENOUS ONCE
Status: COMPLETED | OUTPATIENT
Start: 2023-08-02 | End: 2023-08-02

## 2023-08-02 RX ORDER — ACETAMINOPHEN 500 MG
1000 TABLET ORAL ONCE
Status: COMPLETED | OUTPATIENT
Start: 2023-08-02 | End: 2023-08-02

## 2023-08-02 RX ORDER — 0.9 % SODIUM CHLORIDE 0.9 %
1000 INTRAVENOUS SOLUTION INTRAVENOUS ONCE
Status: COMPLETED | OUTPATIENT
Start: 2023-08-02 | End: 2023-08-02

## 2023-08-02 RX ADMIN — PROCHLORPERAZINE EDISYLATE 10 MG: 5 INJECTION INTRAMUSCULAR; INTRAVENOUS at 03:52

## 2023-08-02 RX ADMIN — SODIUM CHLORIDE 1000 ML: 9 INJECTION, SOLUTION INTRAVENOUS at 03:53

## 2023-08-02 RX ADMIN — DIPHENHYDRAMINE HYDROCHLORIDE 25 MG: 50 INJECTION INTRAMUSCULAR; INTRAVENOUS at 03:52

## 2023-08-02 RX ADMIN — ACETAMINOPHEN 1000 MG: 500 TABLET ORAL at 03:52

## 2023-08-02 ASSESSMENT — PAIN SCALES - GENERAL: PAINLEVEL_OUTOF10: 6

## 2023-08-02 ASSESSMENT — ENCOUNTER SYMPTOMS
VOMITING: 0
BACK PAIN: 0
SHORTNESS OF BREATH: 0
ABDOMINAL PAIN: 0

## 2023-08-02 ASSESSMENT — PAIN - FUNCTIONAL ASSESSMENT: PAIN_FUNCTIONAL_ASSESSMENT: 0-10
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
## 2023-08-14 ENCOUNTER — OFFICE VISIT (OUTPATIENT)
Dept: PODIATRY | Age: 38
End: 2023-08-14
Payer: MEDICAID

## 2023-08-14 VITALS — HEIGHT: 65 IN | BODY MASS INDEX: 37.82 KG/M2 | WEIGHT: 227 LBS

## 2023-08-14 DIAGNOSIS — M79.672 LEFT FOOT PAIN: Primary | ICD-10-CM

## 2023-08-14 PROCEDURE — 99204 OFFICE O/P NEW MOD 45 MIN: CPT | Performed by: PODIATRIST

## 2023-08-14 PROCEDURE — G8427 DOCREV CUR MEDS BY ELIG CLIN: HCPCS | Performed by: PODIATRIST

## 2023-08-14 PROCEDURE — G8417 CALC BMI ABV UP PARAM F/U: HCPCS | Performed by: PODIATRIST

## 2023-08-14 PROCEDURE — 1036F TOBACCO NON-USER: CPT | Performed by: PODIATRIST

## 2023-08-14 RX ORDER — HYDROCODONE BITARTRATE AND ACETAMINOPHEN 5; 325 MG/1; MG/1
1 TABLET ORAL EVERY 6 HOURS PRN
Qty: 28 TABLET | Refills: 0 | Status: SHIPPED | OUTPATIENT
Start: 2023-08-14 | End: 2023-08-21

## 2023-08-14 RX ORDER — KETOROLAC TROMETHAMINE 10 MG/1
10 TABLET, FILM COATED ORAL EVERY 6 HOURS PRN
Qty: 20 TABLET | Refills: 0 | Status: SHIPPED | OUTPATIENT
Start: 2023-08-14 | End: 2024-08-13

## 2023-08-14 RX ORDER — IBUPROFEN 800 MG/1
800 TABLET ORAL 3 TIMES DAILY
COMMUNITY
Start: 2023-08-05

## 2023-09-10 ENCOUNTER — HOSPITAL ENCOUNTER (EMERGENCY)
Age: 38
Discharge: HOME | End: 2023-09-10
Attending: EMERGENCY MEDICINE
Payer: MEDICAID

## 2023-09-10 VITALS
RESPIRATION RATE: 20 BRPM | HEART RATE: 98 BPM | DIASTOLIC BLOOD PRESSURE: 96 MMHG | TEMPERATURE: 99.2 F | BODY MASS INDEX: 38.86 KG/M2 | WEIGHT: 233.25 LBS | SYSTOLIC BLOOD PRESSURE: 147 MMHG | OXYGEN SATURATION: 98 % | HEIGHT: 65 IN

## 2023-09-10 DIAGNOSIS — A08.4 VIRAL GASTROENTERITIS: Primary | ICD-10-CM

## 2023-09-10 DIAGNOSIS — R51.9 ACUTE NONINTRACTABLE HEADACHE, UNSPECIFIED HEADACHE TYPE: ICD-10-CM

## 2023-09-10 LAB
ALBUMIN SERPL-MCNC: 4.5 G/DL (ref 3.5–5.2)
ALBUMIN/GLOB SERPL: 1.3 {RATIO} (ref 1–2.5)
ALP SERPL-CCNC: 84 U/L (ref 35–104)
ALT SERPL-CCNC: 27 U/L (ref 5–33)
ANION GAP SERPL CALCULATED.3IONS-SCNC: 14 MMOL/L (ref 9–17)
AST SERPL-CCNC: 17 U/L
BASOPHILS # BLD: 0.04 K/UL (ref 0–0.2)
BASOPHILS NFR BLD: 0 % (ref 0–2)
BILIRUB SERPL-MCNC: 0.5 MG/DL (ref 0.3–1.2)
BUN SERPL-MCNC: 8 MG/DL (ref 6–20)
CALCIUM SERPL-MCNC: 9.1 MG/DL (ref 8.6–10.4)
CHLORIDE SERPL-SCNC: 101 MMOL/L (ref 98–107)
CO2 SERPL-SCNC: 18 MMOL/L (ref 20–31)
CREAT SERPL-MCNC: 0.5 MG/DL (ref 0.5–0.9)
EOSINOPHIL # BLD: 0.18 K/UL (ref 0–0.44)
EOSINOPHILS RELATIVE PERCENT: 2 % (ref 1–4)
ERYTHROCYTE [DISTWIDTH] IN BLOOD BY AUTOMATED COUNT: 13.4 % (ref 11.8–14.4)
GFR SERPL CREATININE-BSD FRML MDRD: >60 ML/MIN/1.73M2
GLUCOSE SERPL-MCNC: 116 MG/DL (ref 70–99)
HCG SERPL QL: NEGATIVE
HCT VFR BLD AUTO: 43.6 % (ref 36.3–47.1)
HGB BLD-MCNC: 14 G/DL (ref 11.9–15.1)
IMM GRANULOCYTES # BLD AUTO: 0.04 K/UL (ref 0–0.3)
IMM GRANULOCYTES NFR BLD: 0 %
LIPASE SERPL-CCNC: 47 U/L (ref 13–60)
LYMPHOCYTES NFR BLD: 3.17 K/UL (ref 1.1–3.7)
LYMPHOCYTES RELATIVE PERCENT: 29 % (ref 24–43)
MCH RBC QN AUTO: 26 PG (ref 25.2–33.5)
MCHC RBC AUTO-ENTMCNC: 32.1 G/DL (ref 28.4–34.8)
MCV RBC AUTO: 81 FL (ref 82.6–102.9)
MONOCYTES NFR BLD: 0.73 K/UL (ref 0.1–1.2)
MONOCYTES NFR BLD: 7 % (ref 3–12)
NEUTROPHILS NFR BLD: 62 % (ref 36–65)
NEUTS SEG NFR BLD: 6.91 K/UL (ref 1.5–8.1)
NRBC BLD-RTO: 0 PER 100 WBC
PLATELET # BLD AUTO: 342 K/UL (ref 138–453)
PMV BLD AUTO: 11.1 FL (ref 8.1–13.5)
POTASSIUM SERPL-SCNC: 3.9 MMOL/L (ref 3.7–5.3)
PROT SERPL-MCNC: 7.9 G/DL (ref 6.4–8.3)
RBC # BLD AUTO: 5.38 M/UL (ref 3.95–5.11)
RBC # BLD: ABNORMAL 10*6/UL
SARS-COV-2 RDRP RESP QL NAA+PROBE: NOT DETECTED
SODIUM SERPL-SCNC: 133 MMOL/L (ref 135–144)
SPECIMEN DESCRIPTION: NORMAL
WBC OTHER # BLD: 11.1 K/UL (ref 3.5–11.3)

## 2023-09-10 PROCEDURE — 96374 THER/PROPH/DIAG INJ IV PUSH: CPT

## 2023-09-10 PROCEDURE — 96375 TX/PRO/DX INJ NEW DRUG ADDON: CPT

## 2023-09-10 PROCEDURE — 2580000003 HC RX 258: Performed by: STUDENT IN AN ORGANIZED HEALTH CARE EDUCATION/TRAINING PROGRAM

## 2023-09-10 PROCEDURE — 87635 SARS-COV-2 COVID-19 AMP PRB: CPT

## 2023-09-10 PROCEDURE — 96361 HYDRATE IV INFUSION ADD-ON: CPT

## 2023-09-10 PROCEDURE — 80053 COMPREHEN METABOLIC PANEL: CPT

## 2023-09-10 PROCEDURE — 83690 ASSAY OF LIPASE: CPT

## 2023-09-10 PROCEDURE — 85025 COMPLETE CBC W/AUTO DIFF WBC: CPT

## 2023-09-10 PROCEDURE — 84703 CHORIONIC GONADOTROPIN ASSAY: CPT

## 2023-09-10 PROCEDURE — 6370000000 HC RX 637 (ALT 250 FOR IP): Performed by: STUDENT IN AN ORGANIZED HEALTH CARE EDUCATION/TRAINING PROGRAM

## 2023-09-10 PROCEDURE — 99284 EMERGENCY DEPT VISIT MOD MDM: CPT

## 2023-09-10 PROCEDURE — 6360000002 HC RX W HCPCS: Performed by: STUDENT IN AN ORGANIZED HEALTH CARE EDUCATION/TRAINING PROGRAM

## 2023-09-10 RX ORDER — ACETAMINOPHEN 500 MG
1000 TABLET ORAL EVERY 8 HOURS PRN
Qty: 21 TABLET | Refills: 0 | Status: SHIPPED | OUTPATIENT
Start: 2023-09-10 | End: 2023-09-17

## 2023-09-10 RX ORDER — ACETAMINOPHEN 500 MG
1000 TABLET ORAL ONCE
Status: COMPLETED | OUTPATIENT
Start: 2023-09-10 | End: 2023-09-10

## 2023-09-10 RX ORDER — DICYCLOMINE HCL 20 MG
20 TABLET ORAL 4 TIMES DAILY
Qty: 30 TABLET | Refills: 0 | Status: SHIPPED | OUTPATIENT
Start: 2023-09-10

## 2023-09-10 RX ORDER — METOCLOPRAMIDE HYDROCHLORIDE 5 MG/ML
10 INJECTION INTRAMUSCULAR; INTRAVENOUS ONCE
Status: COMPLETED | OUTPATIENT
Start: 2023-09-10 | End: 2023-09-10

## 2023-09-10 RX ORDER — IBUPROFEN 800 MG/1
800 TABLET ORAL EVERY 8 HOURS PRN
Qty: 21 TABLET | Refills: 0 | Status: SHIPPED | OUTPATIENT
Start: 2023-09-10 | End: 2023-09-17

## 2023-09-10 RX ORDER — ONDANSETRON 4 MG/1
4 TABLET, FILM COATED ORAL EVERY 8 HOURS PRN
Qty: 20 TABLET | Refills: 0 | Status: SHIPPED | OUTPATIENT
Start: 2023-09-10

## 2023-09-10 RX ORDER — KETOROLAC TROMETHAMINE 30 MG/ML
30 INJECTION, SOLUTION INTRAMUSCULAR; INTRAVENOUS ONCE
Status: COMPLETED | OUTPATIENT
Start: 2023-09-10 | End: 2023-09-10

## 2023-09-10 RX ORDER — 0.9 % SODIUM CHLORIDE 0.9 %
1000 INTRAVENOUS SOLUTION INTRAVENOUS ONCE
Status: COMPLETED | OUTPATIENT
Start: 2023-09-10 | End: 2023-09-10

## 2023-09-10 RX ORDER — DIPHENHYDRAMINE HYDROCHLORIDE 50 MG/ML
25 INJECTION INTRAMUSCULAR; INTRAVENOUS ONCE
Status: COMPLETED | OUTPATIENT
Start: 2023-09-10 | End: 2023-09-10

## 2023-09-10 RX ADMIN — DIPHENHYDRAMINE HYDROCHLORIDE 25 MG: 50 INJECTION INTRAMUSCULAR; INTRAVENOUS at 07:02

## 2023-09-10 RX ADMIN — ACETAMINOPHEN 1000 MG: 500 TABLET ORAL at 07:56

## 2023-09-10 RX ADMIN — KETOROLAC TROMETHAMINE 30 MG: 30 INJECTION, SOLUTION INTRAMUSCULAR; INTRAVENOUS at 07:03

## 2023-09-10 RX ADMIN — METOCLOPRAMIDE 10 MG: 5 INJECTION, SOLUTION INTRAMUSCULAR; INTRAVENOUS at 07:03

## 2023-09-10 RX ADMIN — SODIUM CHLORIDE 1000 ML: 9 INJECTION, SOLUTION INTRAVENOUS at 07:02

## 2023-09-10 ASSESSMENT — ENCOUNTER SYMPTOMS
PHOTOPHOBIA: 0
NAUSEA: 1
ABDOMINAL PAIN: 1
ABDOMINAL DISTENTION: 0
SORE THROAT: 0
RHINORRHEA: 0
ANAL BLEEDING: 0
SHORTNESS OF BREATH: 0
VOMITING: 0
DIARRHEA: 1
CHEST TIGHTNESS: 0
CONSTIPATION: 0

## 2023-09-10 ASSESSMENT — PAIN SCALES - GENERAL: PAINLEVEL_OUTOF10: 10

## 2023-09-10 ASSESSMENT — PAIN - FUNCTIONAL ASSESSMENT: PAIN_FUNCTIONAL_ASSESSMENT: 0-10

## 2023-09-10 NOTE — ED TRIAGE NOTES
Pt presents to ED with a c/o headache, abdominal pain, nausea, diarrhea. Pt states she's having these symptoms for 2 days. Pt took 2 tablets of Tylenol at middnight. Attached to full cardiac monitor. Establish an IV line. Placed on semi villatoro's position. Call light within reach.

## 2023-09-10 NOTE — ED NOTES
Patient is resting on cot, no acute distress noted. Patient is AOx4. Bed is in lowest position with call light with in reach. Patient denies any further needs at this time and will continue with plan of care.         Gorge Gamboa, RN  09/10/23 1124

## 2023-09-10 NOTE — ED PROVIDER NOTES
[facility] ED  Emergency Department Encounter  Emergency Medicine Resident     Pt Romana Dominguez  MRN: [id number]  [address] [facility] 1985  Date of evaluation: 9/10/23  PCP:  Steven Romero M.D., MD  Note Started: 6:47 AM EDT      CHIEF COMPLAINT       Chief Complaint   Patient presents with    Abdominal Pain    Headache    Diarrhea    Nausea       HISTORY OF PRESENT ILLNESS  (Location/Symptom, Timing/Onset, Context/Setting, Quality, Duration, Modifying Factors, Severity.)      Ac Hyman is a 41 y.o. female who presents here with multiple complaints. She is having headache and nausea but denies vomiting. She is also having generalized abdominal pain and nonbloody diarrhea. She denies cough chest pain or shortness of breath. She does not drink daily but has had issues with her pancreas in the past.  She states her headache feels like it could be from a cold or one of her migraines but she denies any numbness or weakness vision changes or speech changes. Patient does have recent sick contacts. Patient has had her gallbladder removed    PAST MEDICAL / SURGICAL / SOCIAL / FAMILY HISTORY      has a past medical history of Alcohol abuse, Atypical squamous cell changes of undetermined significance (ASCUS) on cervical cytology with positive high risk human papilloma virus (HPV), Chlamydia, Depressive disorder, Generalized anxiety disorder, History of anxiety, HPV in female, Migraines, Mixed anxiety and depressive disorder, Postpartum depression, Seasonal allergies, Strep throat, and Tonsillitis. has a past surgical history that includes Cholecystectomy; Plantar fascia surgery (Right, 2018); pr fasciotomy foot&/toe (Right, 2018); Foot surgery (Left, 2019); Plantar fascia surgery (Left, 2019); laparoscopy; and  section (N/A, 2020).     Social History     Socioeconomic History    Marital status: Single     Spouse name: Not on file    Number of children:

## 2023-09-11 ENCOUNTER — HOSPITAL ENCOUNTER (EMERGENCY)
Age: 38
Discharge: HOME | End: 2023-09-11
Attending: EMERGENCY MEDICINE
Payer: MEDICAID

## 2023-09-11 VITALS
TEMPERATURE: 97.4 F | DIASTOLIC BLOOD PRESSURE: 99 MMHG | RESPIRATION RATE: 17 BRPM | SYSTOLIC BLOOD PRESSURE: 135 MMHG | HEART RATE: 92 BPM | OXYGEN SATURATION: 93 %

## 2023-09-11 DIAGNOSIS — G43.909 MIGRAINE WITHOUT STATUS MIGRAINOSUS, NOT INTRACTABLE, UNSPECIFIED MIGRAINE TYPE: Primary | ICD-10-CM

## 2023-09-11 PROCEDURE — 2580000003 HC RX 258: Performed by: STUDENT IN AN ORGANIZED HEALTH CARE EDUCATION/TRAINING PROGRAM

## 2023-09-11 PROCEDURE — 6370000000 HC RX 637 (ALT 250 FOR IP): Performed by: STUDENT IN AN ORGANIZED HEALTH CARE EDUCATION/TRAINING PROGRAM

## 2023-09-11 PROCEDURE — 6360000002 HC RX W HCPCS: Performed by: STUDENT IN AN ORGANIZED HEALTH CARE EDUCATION/TRAINING PROGRAM

## 2023-09-11 PROCEDURE — 99284 EMERGENCY DEPT VISIT MOD MDM: CPT

## 2023-09-11 PROCEDURE — 96374 THER/PROPH/DIAG INJ IV PUSH: CPT

## 2023-09-11 PROCEDURE — 96375 TX/PRO/DX INJ NEW DRUG ADDON: CPT

## 2023-09-11 RX ORDER — PROCHLORPERAZINE EDISYLATE 5 MG/ML
10 INJECTION INTRAMUSCULAR; INTRAVENOUS ONCE
Status: DISCONTINUED | OUTPATIENT
Start: 2023-09-11 | End: 2023-09-11

## 2023-09-11 RX ORDER — 0.9 % SODIUM CHLORIDE 0.9 %
1000 INTRAVENOUS SOLUTION INTRAVENOUS ONCE
Status: COMPLETED | OUTPATIENT
Start: 2023-09-11 | End: 2023-09-11

## 2023-09-11 RX ORDER — ACETAMINOPHEN 500 MG
1000 TABLET ORAL ONCE
Status: COMPLETED | OUTPATIENT
Start: 2023-09-11 | End: 2023-09-11

## 2023-09-11 RX ORDER — KETOROLAC TROMETHAMINE 15 MG/ML
15 INJECTION, SOLUTION INTRAMUSCULAR; INTRAVENOUS ONCE
Status: COMPLETED | OUTPATIENT
Start: 2023-09-11 | End: 2023-09-11

## 2023-09-11 RX ORDER — KETOROLAC TROMETHAMINE 15 MG/ML
15 INJECTION, SOLUTION INTRAMUSCULAR; INTRAVENOUS ONCE
Status: DISCONTINUED | OUTPATIENT
Start: 2023-09-11 | End: 2023-09-11

## 2023-09-11 RX ORDER — PROCHLORPERAZINE EDISYLATE 5 MG/ML
10 INJECTION INTRAMUSCULAR; INTRAVENOUS ONCE
Status: COMPLETED | OUTPATIENT
Start: 2023-09-11 | End: 2023-09-11

## 2023-09-11 RX ORDER — DIPHENHYDRAMINE HCL 25 MG
25 TABLET ORAL ONCE
Status: DISCONTINUED | OUTPATIENT
Start: 2023-09-11 | End: 2023-09-11

## 2023-09-11 RX ORDER — DIPHENHYDRAMINE HYDROCHLORIDE 50 MG/ML
12.5 INJECTION INTRAMUSCULAR; INTRAVENOUS ONCE
Status: COMPLETED | OUTPATIENT
Start: 2023-09-11 | End: 2023-09-11

## 2023-09-11 RX ADMIN — PROCHLORPERAZINE EDISYLATE 10 MG: 5 INJECTION INTRAMUSCULAR; INTRAVENOUS at 19:36

## 2023-09-11 RX ADMIN — SODIUM CHLORIDE 1000 ML: 9 INJECTION, SOLUTION INTRAVENOUS at 19:43

## 2023-09-11 RX ADMIN — KETOROLAC TROMETHAMINE 15 MG: 15 INJECTION, SOLUTION INTRAMUSCULAR; INTRAVENOUS at 19:35

## 2023-09-11 RX ADMIN — DIPHENHYDRAMINE HYDROCHLORIDE 12.5 MG: 50 INJECTION INTRAMUSCULAR; INTRAVENOUS at 19:33

## 2023-09-11 RX ADMIN — ACETAMINOPHEN 1000 MG: 500 TABLET ORAL at 19:32

## 2023-09-11 NOTE — DISCHARGE INSTRUCTIONS
You have been seen in the ER today for headache. If you begin to experience any symptoms such as chest pain shortness of breath nausea vomiting dizziness drowsiness abdominal pain loss of consciousness or any other symptoms you find concerning please return to the ED for follow-up evaluation. If you have been given pain medication please take them only as prescribed. Do not take more medication than prescribed at any given time. Please follow-up with your primary care provider within 3-5 days for continued care, sooner if you have concerns.

## 2023-09-12 NOTE — ED NOTES
Pt arrived to ED via triage. Pt states having migraine since Friday, worsening with double vision. Pt states taking tylenol and benadryl this morning about 0800. Pt has no other complaints. VSS, NAD, AOx4. Patient resting in bed, respirations even and unlabored. Call light in reach.        Yuriy Ball RN  09/11/23 2042

## 2023-09-25 ENCOUNTER — OFFICE VISIT (OUTPATIENT)
Dept: PODIATRY | Age: 38
End: 2023-09-25
Payer: MEDICAID

## 2023-09-25 DIAGNOSIS — M79.672 LEFT FOOT PAIN: Primary | ICD-10-CM

## 2023-09-25 PROCEDURE — 99214 OFFICE O/P EST MOD 30 MIN: CPT | Performed by: PODIATRIST

## 2023-09-25 PROCEDURE — G8428 CUR MEDS NOT DOCUMENT: HCPCS | Performed by: PODIATRIST

## 2023-09-25 PROCEDURE — G8417 CALC BMI ABV UP PARAM F/U: HCPCS | Performed by: PODIATRIST

## 2023-09-25 PROCEDURE — 1036F TOBACCO NON-USER: CPT | Performed by: PODIATRIST

## 2023-10-02 NOTE — PROGRESS NOTES
[address] [facility] POD[facility]ATRY  [address] [address] [address] [facility]  Dept: [phone]    RETURN PATIENT PROGRESS NOTE  Date of patient's visit: 9/25/2023  Patient's Name:  Polo Martino YOB: 1985            Patient Care Team:  Florinda Rothman MD as PCP - General (Family Medicine)  Anna Salguero DO as Consulting Physician (Obstetrics & Gynecology)  Jocelyn Randhawa DPM as Consulting Physician (Podiatry)  Rita Jimenez MD as Obstetrician (Perinatology)       Camille Cardozasandy 41 y.o. female that presents for follow-up of   Chief Complaint   Patient presents with    Foot Injury     left     Symptoms began 6 week(s) ago and are decreased . Patient relates pain is Present. Pain is rated 4 out of 10 and is described as mild. Treatments prior to today's visit include: being in cam boot from ankle sprain and metataral fracture . Currently denies F/C/N/V. Allergies   Allergen Reactions    No Known Allergies     Seasonal        Past Medical History:   Diagnosis Date    Alcohol abuse 2/8/2017    Atypical squamous cell changes of undetermined significance (ASCUS) on cervical cytology with positive high risk human papilloma virus (HPV) 1/5/16    Chlamydia 10/9/12    Depressive disorder 06/06/2013    is doing well now, no longer on medication    Generalized anxiety disorder     History of anxiety     HPV in female 1/2016    Migraines     Mixed anxiety and depressive disorder     Postpartum depression 2/3/2021    Seasonal allergies     Strep throat 08/2018    Tonsillitis 08/2018       Prior to Admission medications    Medication Sig Start Date End Date Taking?  Authorizing Provider   acetaminophen (TYLENOL) 500 MG tablet Take 2 tablets by mouth every 8 hours as needed for Pain 9/10/23 9/17/23  Pavithra Mishra DO   ibuprofen (ADVIL;MOTRIN) 800 MG tablet Take 1 tablet by mouth every 8 hours as needed for Pain 9/10/23 9/17/23

## 2023-10-19 PROBLEM — S92.302S: Status: ACTIVE | Noted: 2023-09-18

## 2023-10-19 RX ORDER — RIZATRIPTAN BENZOATE 10 MG/1
TABLET ORAL
COMMUNITY
Start: 2023-09-18

## 2023-10-23 ENCOUNTER — OFFICE VISIT (OUTPATIENT)
Dept: OBGYN CLINIC | Age: 38
End: 2023-10-23
Payer: MEDICAID

## 2023-10-23 ENCOUNTER — HOSPITAL ENCOUNTER (OUTPATIENT)
Age: 38
Setting detail: SPECIMEN
Discharge: HOME | End: 2023-10-23

## 2023-10-23 VITALS
WEIGHT: 227 LBS | SYSTOLIC BLOOD PRESSURE: 120 MMHG | HEIGHT: 65 IN | DIASTOLIC BLOOD PRESSURE: 86 MMHG | BODY MASS INDEX: 37.82 KG/M2

## 2023-10-23 DIAGNOSIS — Z11.51 ENCOUNTER FOR SCREENING FOR HUMAN PAPILLOMAVIRUS (HPV): ICD-10-CM

## 2023-10-23 DIAGNOSIS — N94.6 DYSMENORRHEA: ICD-10-CM

## 2023-10-23 DIAGNOSIS — Z32.02 NEGATIVE PREGNANCY TEST: ICD-10-CM

## 2023-10-23 DIAGNOSIS — N89.8 VAGINAL ODOR: ICD-10-CM

## 2023-10-23 DIAGNOSIS — Z01.419 VISIT FOR GYNECOLOGIC EXAMINATION: Primary | ICD-10-CM

## 2023-10-23 LAB
CONTROL: NORMAL
PREGNANCY TEST URINE, POC: NEGATIVE

## 2023-10-23 PROCEDURE — 99395 PREV VISIT EST AGE 18-39: CPT | Performed by: NURSE PRACTITIONER

## 2023-10-23 PROCEDURE — G8484 FLU IMMUNIZE NO ADMIN: HCPCS | Performed by: NURSE PRACTITIONER

## 2023-10-23 PROCEDURE — 81025 URINE PREGNANCY TEST: CPT | Performed by: NURSE PRACTITIONER

## 2023-10-23 RX ORDER — MEDROXYPROGESTERONE ACETATE 150 MG/ML
150 INJECTION, SUSPENSION INTRAMUSCULAR ONCE
Status: COMPLETED | OUTPATIENT
Start: 2023-10-23 | End: 2023-10-23

## 2023-10-23 RX ADMIN — MEDROXYPROGESTERONE ACETATE 150 MG: 150 INJECTION, SUSPENSION INTRAMUSCULAR at 16:12

## 2023-10-23 NOTE — PROGRESS NOTES
Per Fernando Saucedo, depo  given in her left delt. LOT [id number] exp 3/2025. Upt negative. To return in 12 weeks for next injection. See MAR for further injection information.
her visit.

## 2023-10-25 ENCOUNTER — TELEPHONE (OUTPATIENT)
Dept: OBGYN CLINIC | Age: 38
End: 2023-10-25

## 2023-10-25 RX ORDER — METRONIDAZOLE 500 MG/1
500 TABLET ORAL 2 TIMES DAILY
Qty: 14 TABLET | Refills: 0 | Status: SHIPPED | OUTPATIENT
Start: 2023-10-25 | End: 2023-11-01

## 2023-10-25 NOTE — TELEPHONE ENCOUNTER
----- Message from NAHUN Liang - CNP sent at 10/24/2023  4:22 PM EDT -----  +BV- flagyl 500mg PO bID x 7 days

## 2023-10-26 LAB
CYTOLOGY REPORT: NORMAL
HPV I/H RISK 4 DNA CVX QL NAA+PROBE: NOT DETECTED
HPV SAMPLE: NORMAL
HPV, INTERPRETATION: NORMAL
HPV16 DNA CVX QL NAA+PROBE: NOT DETECTED
HPV18 DNA CVX QL NAA+PROBE: NOT DETECTED
SPECIMEN DESCRIPTION: NORMAL

## 2023-11-02 ENCOUNTER — TELEPHONE (OUTPATIENT)
Dept: OBGYN CLINIC | Age: 38
End: 2023-11-02

## 2023-11-02 NOTE — TELEPHONE ENCOUNTER
Pt finished flagyl yesterday ( 11/1/23) however still feels like she has slight odor , no discharge. What should she take to treat?    [phone]

## 2023-11-03 RX ORDER — CLINDAMYCIN HYDROCHLORIDE 300 MG/1
300 CAPSULE ORAL 2 TIMES DAILY
Qty: 14 CAPSULE | Refills: 0 | Status: SHIPPED | OUTPATIENT
Start: 2023-11-03 | End: 2023-11-10

## 2023-11-03 NOTE — TELEPHONE ENCOUNTER
Per Alessandro Maddox NP Patient can try cleocin 300 mg PO BID x 7 days    Pt finished flagyl yesterday ( 11/1/23) however still feels like she has slight odor , no discharge. What should she take to treat?

## 2023-11-13 ENCOUNTER — OFFICE VISIT (OUTPATIENT)
Dept: PODIATRY | Age: 38
End: 2023-11-13
Payer: MEDICAID

## 2023-11-13 VITALS — WEIGHT: 227 LBS | HEIGHT: 65 IN | BODY MASS INDEX: 37.82 KG/M2

## 2023-11-13 DIAGNOSIS — M79.672 LEFT FOOT PAIN: Primary | ICD-10-CM

## 2023-11-13 PROCEDURE — 1036F TOBACCO NON-USER: CPT | Performed by: PODIATRIST

## 2023-11-13 PROCEDURE — G8484 FLU IMMUNIZE NO ADMIN: HCPCS | Performed by: PODIATRIST

## 2023-11-13 PROCEDURE — G8417 CALC BMI ABV UP PARAM F/U: HCPCS | Performed by: PODIATRIST

## 2023-11-13 PROCEDURE — G8427 DOCREV CUR MEDS BY ELIG CLIN: HCPCS | Performed by: PODIATRIST

## 2023-11-13 PROCEDURE — 99214 OFFICE O/P EST MOD 30 MIN: CPT | Performed by: PODIATRIST

## 2023-11-13 RX ORDER — IBUPROFEN 600 MG/1
600 TABLET ORAL 3 TIMES DAILY PRN
Qty: 90 TABLET | Refills: 0 | Status: SHIPPED | OUTPATIENT
Start: 2023-11-13

## 2023-11-13 NOTE — PROGRESS NOTES
the above findings were reviewed PRIOR to the patients arrival and with the patient today. Previous patient encounter was reviewed. Encounters from the patients other medical providers were reviewed and noted. I personally interpreted the imaging and labs and discussed the results with the patient Time was spent educating the patient and their families/caregivers on proper care of the feet and ankles. All the above diagnosis were addressed at todays visit and all questions were answered. A total of 30 minutes was spent with this patients encounter which included charting after the patients visit  . Verbal and written instructions given to patient. Contact office with any questions/problems/concerns. No orders of the defined types were placed in this encounter. No orders of the defined types were placed in this encounter. RTC in 9week(s).     11/13/2023      Electronically signed by Liss Valdez DPM on 11/13/2023 at 12:57 PM  11/13/2023

## 2023-11-17 ENCOUNTER — TELEPHONE (OUTPATIENT)
Dept: OBGYN CLINIC | Age: 38
End: 2023-11-17

## 2023-11-17 ENCOUNTER — OFFICE VISIT (OUTPATIENT)
Dept: OBGYN CLINIC | Age: 38
End: 2023-11-17
Payer: MEDICAID

## 2023-11-17 ENCOUNTER — HOSPITAL ENCOUNTER (OUTPATIENT)
Age: 38
Setting detail: SPECIMEN
Discharge: HOME | End: 2023-11-17

## 2023-11-17 VITALS
BODY MASS INDEX: 37.49 KG/M2 | SYSTOLIC BLOOD PRESSURE: 116 MMHG | WEIGHT: 225 LBS | DIASTOLIC BLOOD PRESSURE: 78 MMHG | HEIGHT: 65 IN

## 2023-11-17 DIAGNOSIS — N76.0 ACUTE VAGINITIS: Primary | ICD-10-CM

## 2023-11-17 DIAGNOSIS — N76.0 ACUTE VAGINITIS: ICD-10-CM

## 2023-11-17 DIAGNOSIS — N89.8 VAGINAL ODOR: ICD-10-CM

## 2023-11-17 LAB
C TRACH DNA SPEC QL PROBE+SIG AMP: NORMAL
CANDIDA SPECIES: NEGATIVE
GARDNERELLA VAGINALIS: NEGATIVE
N GONORRHOEA DNA SPEC QL PROBE+SIG AMP: NORMAL
SOURCE: NORMAL
SPECIMEN DESCRIPTION: NORMAL
TRICHOMONAS: NEGATIVE

## 2023-11-17 PROCEDURE — G8484 FLU IMMUNIZE NO ADMIN: HCPCS | Performed by: NURSE PRACTITIONER

## 2023-11-17 PROCEDURE — 99213 OFFICE O/P EST LOW 20 MIN: CPT | Performed by: NURSE PRACTITIONER

## 2023-11-17 PROCEDURE — G8427 DOCREV CUR MEDS BY ELIG CLIN: HCPCS | Performed by: NURSE PRACTITIONER

## 2023-11-17 PROCEDURE — G8417 CALC BMI ABV UP PARAM F/U: HCPCS | Performed by: NURSE PRACTITIONER

## 2023-11-17 PROCEDURE — 1036F TOBACCO NON-USER: CPT | Performed by: NURSE PRACTITIONER

## 2023-11-17 RX ORDER — METRONIDAZOLE 7.5 MG/G
1 GEL VAGINAL DAILY
Qty: 1 EACH | Refills: 0 | Status: SHIPPED | OUTPATIENT
Start: 2023-11-17 | End: 2023-11-22

## 2023-11-17 NOTE — TELEPHONE ENCOUNTER
Pt finished meds to treat BV, has some concerns , would like to speak w nurse    [phone] to discuss

## 2023-11-17 NOTE — PROGRESS NOTES
Max Sims      YOB: 1985          The patient was seen today. She is here regarding c/o vaginal odor x few weeks. Used flagyl and cleocin without relief. Marcie Gutierrez Her bowels are regular and she is voiding without difficulty.      HPI:  Max Sims is a 41 y.o. female [id number] vaginal odor       OB History    Para Term  AB Living   5 3 3 0 2 3   SAB IAB Ectopic Molar Multiple Live Births   0 2 0 0 0 3      # Outcome Date GA Lbr Henrik/2nd Weight Sex Delivery Anes PTL Lv   5 Term 20 38w5d  3.929 kg (8 lb 10.6 oz) F CS-LTranv Spinal N BETTY      Name: Mayra Gaster: 8  Apgar5: 9   4 Term 02/15/09 39w0d 07:00 3.515 kg (7 lb 12 oz) F Vag-Spont None  BETTY      Name: Carpenter Lorraine   3 Term 06 40w0d 07:00 3.771 kg (8 lb 5 oz) F Vag-Forceps None  BETTY      Name: Leslie Model   2 IAB            1 IAB                Past Medical History:   Diagnosis Date    Alcohol abuse 2017    Atypical squamous cell changes of undetermined significance (ASCUS) on cervical cytology with positive high risk human papilloma virus (HPV) 16    Chlamydia 10/9/12    Depressive disorder 2013    is doing well now, no longer on medication    Generalized anxiety disorder     History of anxiety     HPV in female 2016    Migraines     Mixed anxiety and depressive disorder     Postpartum depression 2/3/2021    Seasonal allergies     Strep throat 2018    Tonsillitis 2018       Past Surgical History:   Procedure Laterality Date     SECTION N/A 2020     SECTION/PRIMARY performed by Desire Terry DO at [address] [facility] OR    CHOLECYSTECTOMY      FOOT SURGERY Left 2019    Plantar fasciectomy left foot     LAPAROSCOPY      PLANTAR FASCIA SURGERY Right 2018    PLANTAR FASCIOTOMY RIGHT AND PRP INJECTION RIGHT FOOT - CELLSAVER    PLANTAR FASCIA SURGERY Left 2019    OPEN PLANTAR FASCIECTOMY WITH PRP INJECTION LEFT performed by Bryan Miller

## 2023-11-21 LAB
C TRACH DNA SPEC QL PROBE+SIG AMP: NEGATIVE
N GONORRHOEA DNA SPEC QL PROBE+SIG AMP: NEGATIVE
SPECIMEN DESCRIPTION: NORMAL

## 2023-12-01 ENCOUNTER — TELEPHONE (OUTPATIENT)
Dept: OBGYN CLINIC | Age: 38
End: 2023-12-01

## 2023-12-01 RX ORDER — CLINDAMYCIN HYDROCHLORIDE 300 MG/1
300 CAPSULE ORAL 2 TIMES DAILY
Qty: 14 CAPSULE | Refills: 0 | Status: SHIPPED | OUTPATIENT
Start: 2023-12-01 | End: 2023-12-08

## 2023-12-01 NOTE — TELEPHONE ENCOUNTER
Pt called  in stating she finished her medication for BV , but she still has it , can something different be done or called in for her , call into [address]

## 2024-01-31 DIAGNOSIS — N92.6 MISSED PERIOD: Primary | ICD-10-CM

## 2024-06-12 DIAGNOSIS — Z30.09 FAMILY PLANNING: Primary | ICD-10-CM

## 2024-06-14 ENCOUNTER — HOSPITAL ENCOUNTER (OUTPATIENT)
Age: 39
Discharge: HOME | End: 2024-06-14
Payer: MEDICAID

## 2024-06-14 DIAGNOSIS — Z30.09 FAMILY PLANNING: ICD-10-CM

## 2024-06-14 LAB — B-HCG SERPL EIA 3RD IS-ACNC: <0.2 MIU/ML (ref 0–7)

## 2024-06-14 PROCEDURE — 84702 CHORIONIC GONADOTROPIN TEST: CPT

## 2024-06-17 ENCOUNTER — OFFICE VISIT (OUTPATIENT)
Dept: OBGYN CLINIC | Age: 39
End: 2024-06-17
Payer: MEDICAID

## 2024-06-17 VITALS
SYSTOLIC BLOOD PRESSURE: 122 MMHG | HEIGHT: 65 IN | BODY MASS INDEX: 39.32 KG/M2 | DIASTOLIC BLOOD PRESSURE: 74 MMHG | WEIGHT: 236 LBS

## 2024-06-17 DIAGNOSIS — N94.6 DYSMENORRHEA: Primary | ICD-10-CM

## 2024-06-17 DIAGNOSIS — N92.0 MENORRHAGIA WITH REGULAR CYCLE: ICD-10-CM

## 2024-06-17 PROCEDURE — G8427 DOCREV CUR MEDS BY ELIG CLIN: HCPCS | Performed by: NURSE PRACTITIONER

## 2024-06-17 PROCEDURE — 1036F TOBACCO NON-USER: CPT | Performed by: NURSE PRACTITIONER

## 2024-06-17 PROCEDURE — G8417 CALC BMI ABV UP PARAM F/U: HCPCS | Performed by: NURSE PRACTITIONER

## 2024-06-17 PROCEDURE — 96372 THER/PROPH/DIAG INJ SC/IM: CPT | Performed by: NURSE PRACTITIONER

## 2024-06-17 PROCEDURE — 99213 OFFICE O/P EST LOW 20 MIN: CPT | Performed by: NURSE PRACTITIONER

## 2024-06-17 RX ORDER — MEDROXYPROGESTERONE ACETATE 150 MG/ML
150 INJECTION, SUSPENSION INTRAMUSCULAR ONCE
Status: COMPLETED | OUTPATIENT
Start: 2024-06-17 | End: 2024-06-17

## 2024-06-17 RX ADMIN — MEDROXYPROGESTERONE ACETATE 150 MG: 150 INJECTION, SUSPENSION INTRAMUSCULAR at 15:09

## 2024-06-17 NOTE — PROGRESS NOTES
Per Nan pt given depo provera 150mg left deltoid lot#[id number] exp 10/1/2025 quant negative.   
01/05/2016    Cervical atypism 01/05/2016    HPV in female 01/01/2016    Microscopic hematuria 04/16/2014    Disorder of lung 03/19/2014    Cholelithiasis and cholecystitis without obstruction 03/17/2014    Migraine without aura 09/03/2013    Headache 09/03/2013    Depression/Anxiety 06/06/2013    Overweight 06/06/2013           PLAN:  Return in about 4 months (around 10/17/2024) for annual.  HRT signed  Denies a personal or family hx of a blood clot to the leg/lung/brain  Rx depo  Repeat Annual every 1 year  Cervical Cytology Evaluation begins at 21 years old.  If Negative Cytology, Follow-up screening per current guidelines.   Return to the office in 12 weeks.  Counseled on preventative health maintenance follow-up.  No orders of the defined types were placed in this encounter.          The patient, Itzel Kapoor is a 38 y.o. female, was seen with a total time spent of 20 minutes for the visit on this date of service by the E/M provider. The time component had both face to face and non face to face time spent in determining the total time component.  Counseling and education regarding her diagnosis listed below and her options regarding those diagnoses were also included in determining her time component.      Diagnosis Orders   1. Dysmenorrhea  medroxyPROGESTERone (DEPO-PROVERA) injection 150 mg           The patient had her preventative health maintenance recommendations and follow-up reviewed with her at the completion of her visit.

## 2024-09-12 ENCOUNTER — NURSE ONLY (OUTPATIENT)
Dept: OBGYN CLINIC | Age: 39
End: 2024-09-12

## 2024-09-12 VITALS
BODY MASS INDEX: 38.82 KG/M2 | WEIGHT: 233 LBS | HEIGHT: 65 IN | SYSTOLIC BLOOD PRESSURE: 116 MMHG | DIASTOLIC BLOOD PRESSURE: 82 MMHG

## 2024-09-12 DIAGNOSIS — N94.6 DYSMENORRHEA: ICD-10-CM

## 2024-09-12 DIAGNOSIS — N89.8 VAGINAL DISCHARGE: Primary | ICD-10-CM

## 2024-09-12 DIAGNOSIS — Z11.3 SCREEN FOR STD (SEXUALLY TRANSMITTED DISEASE): ICD-10-CM

## 2024-09-12 DIAGNOSIS — Z32.02 NEGATIVE PREGNANCY TEST: ICD-10-CM

## 2024-09-12 LAB
CONTROL: NORMAL
PREGNANCY TEST URINE, POC: NEGATIVE

## 2024-09-12 RX ORDER — MEDROXYPROGESTERONE ACETATE 150 MG/ML
150 INJECTION, SUSPENSION INTRAMUSCULAR ONCE
Status: COMPLETED | OUTPATIENT
Start: 2024-09-12 | End: 2024-09-12

## 2024-09-12 RX ADMIN — MEDROXYPROGESTERONE ACETATE 150 MG: 150 INJECTION, SUSPENSION INTRAMUSCULAR at 12:56

## 2024-09-12 ASSESSMENT — PATIENT HEALTH QUESTIONNAIRE - PHQ9
SUM OF ALL RESPONSES TO PHQ QUESTIONS 1-9: 0
1. LITTLE INTEREST OR PLEASURE IN DOING THINGS: NOT AT ALL
SUM OF ALL RESPONSES TO PHQ9 QUESTIONS 1 & 2: 0
SUM OF ALL RESPONSES TO PHQ QUESTIONS 1-9: 0
2. FEELING DOWN, DEPRESSED OR HOPELESS: NOT AT ALL

## 2024-09-17 ENCOUNTER — TELEPHONE (OUTPATIENT)
Dept: OBGYN CLINIC | Age: 39
End: 2024-09-17

## 2024-09-17 RX ORDER — METRONIDAZOLE 500 MG/1
500 TABLET ORAL 2 TIMES DAILY
Qty: 14 TABLET | Refills: 0 | Status: SHIPPED | OUTPATIENT
Start: 2024-09-17 | End: 2024-09-24

## 2024-09-17 RX ORDER — FLUCONAZOLE 150 MG/1
150 TABLET ORAL WEEKLY
Qty: 2 TABLET | Refills: 0 | Status: SHIPPED | OUTPATIENT
Start: 2024-09-17

## 2024-12-12 ENCOUNTER — OFFICE VISIT (OUTPATIENT)
Dept: OBGYN CLINIC | Age: 39
End: 2024-12-12
Payer: MEDICAID

## 2024-12-12 VITALS
WEIGHT: 232 LBS | SYSTOLIC BLOOD PRESSURE: 118 MMHG | DIASTOLIC BLOOD PRESSURE: 76 MMHG | BODY MASS INDEX: 38.65 KG/M2 | HEIGHT: 65 IN

## 2024-12-12 DIAGNOSIS — N94.6 DYSMENORRHEA: ICD-10-CM

## 2024-12-12 DIAGNOSIS — Z32.02 NEGATIVE PREGNANCY TEST: ICD-10-CM

## 2024-12-12 DIAGNOSIS — Z01.419 WELL WOMAN EXAM WITH ROUTINE GYNECOLOGICAL EXAM: Primary | ICD-10-CM

## 2024-12-12 DIAGNOSIS — N89.8 VAGINAL ODOR: ICD-10-CM

## 2024-12-12 LAB
CONTROL: NORMAL
PREGNANCY TEST URINE, POC: NEGATIVE

## 2024-12-12 PROCEDURE — 99395 PREV VISIT EST AGE 18-39: CPT | Performed by: NURSE PRACTITIONER

## 2024-12-12 PROCEDURE — G8484 FLU IMMUNIZE NO ADMIN: HCPCS | Performed by: NURSE PRACTITIONER

## 2024-12-12 PROCEDURE — 96372 THER/PROPH/DIAG INJ SC/IM: CPT | Performed by: NURSE PRACTITIONER

## 2024-12-12 PROCEDURE — G8427 DOCREV CUR MEDS BY ELIG CLIN: HCPCS | Performed by: NURSE PRACTITIONER

## 2024-12-12 PROCEDURE — 1036F TOBACCO NON-USER: CPT | Performed by: NURSE PRACTITIONER

## 2024-12-12 PROCEDURE — 81025 URINE PREGNANCY TEST: CPT | Performed by: NURSE PRACTITIONER

## 2024-12-12 PROCEDURE — G8417 CALC BMI ABV UP PARAM F/U: HCPCS | Performed by: NURSE PRACTITIONER

## 2024-12-12 PROCEDURE — 99213 OFFICE O/P EST LOW 20 MIN: CPT | Performed by: NURSE PRACTITIONER

## 2024-12-12 RX ORDER — MEDROXYPROGESTERONE ACETATE 150 MG/ML
150 INJECTION, SUSPENSION INTRAMUSCULAR ONCE
Status: COMPLETED | OUTPATIENT
Start: 2024-12-12 | End: 2024-12-12

## 2024-12-12 RX ADMIN — MEDROXYPROGESTERONE ACETATE 150 MG: 150 INJECTION, SUSPENSION INTRAMUSCULAR at 09:53

## 2024-12-12 NOTE — PROGRESS NOTES
History and Physical  [facility]  [facility] [address]  [place], [place]  [address] [phone]   Fax [phone]  Itzel Kapoor  [birth year]24              39 y.o.  Chief Complaint   Patient presents with    Annual Exam    Injections       No LMP recorded. Patient has had an injection.             Primary Care Physician: America Aguilar MD    The patient was seen and examined. She has no chief complaint today and is here for her annual exam.  Her bowels are regular. There are no voiding complaints. She denies any bloating.  She denies vaginal discharge and was counseled on STD's and the need for barrier contraception.     HPI : Itzel Kapoor is a 39 y.o. female     Annual exam  Desires to continue on depo   Restarted depo 2024  C/o vaginal odor x few days  Spots only on depo    no Bloating  no Early Satiety  no Unexplained weight change of more than 15 lbs  no  PMB  no  PCB  ________________________________________________________________________  OB History    Para Term  AB Living   5 3 3 0 2 3   SAB IAB Ectopic Molar Multiple Live Births   0 2 0 0 0 3      # Outcome Date GA Lbr Henrik/2nd Weight Sex Type Anes PTL Lv   5 Term 20 38w5d  3.929 kg (8 lb 10.6 oz) F CS-LTranv Spinal N BETTY      Name: DEEJAY,BABY GIRL ITZEL      Apgar1: 8  Apgar5: 9   4 Term 02/15/09 39w0d 07:00 3.515 kg (7 lb 12 oz) F Vag-Spont None  BETTY      Name: Analina   3 Term 06 40w0d 07:00 3.771 kg (8 lb 5 oz) F Vag-Forceps None  BETTY      Name: Marielh   2 IAB            1 IAB              Past Medical History:   Diagnosis Date    Alcohol abuse 2017    ASCUS with positive high risk HPV cervical 2018    Atypical squamous cell changes of undetermined significance (ASCUS) on cervical cytology with positive high risk human papilloma virus (HPV) 2016    Chlamydia 10/09/2012    Depressive disorder 2013    is doing well now, no longer on medication

## 2024-12-12 NOTE — PROGRESS NOTES
Per Nan pt given Depo Provera 150 mg IM in her left deltoid. Pt tolerated injection well. Pt scheduled for next injection in 12 weeks.     Lot: [id number]  Exp: 3/1/26

## 2025-07-13 ENCOUNTER — OFFICE VISIT (OUTPATIENT)
Age: 40
End: 2025-07-13

## 2025-07-13 VITALS
TEMPERATURE: 100.3 F | HEART RATE: 105 BPM | BODY MASS INDEX: 39.22 KG/M2 | OXYGEN SATURATION: 96 % | DIASTOLIC BLOOD PRESSURE: 81 MMHG | SYSTOLIC BLOOD PRESSURE: 137 MMHG | HEIGHT: 65 IN | WEIGHT: 235.4 LBS | RESPIRATION RATE: 19 BRPM

## 2025-07-13 DIAGNOSIS — J02.0 ACUTE STREPTOCOCCAL PHARYNGITIS: Primary | ICD-10-CM

## 2025-07-13 DIAGNOSIS — J03.00 ACUTE NON-RECURRENT STREPTOCOCCAL TONSILLITIS: ICD-10-CM

## 2025-07-13 LAB — S PYO AG THROAT QL: POSITIVE

## 2025-07-13 RX ORDER — CEFTRIAXONE 1 G/1
1000 INJECTION, POWDER, FOR SOLUTION INTRAMUSCULAR; INTRAVENOUS ONCE
Status: COMPLETED | OUTPATIENT
Start: 2025-07-13 | End: 2025-07-13

## 2025-07-13 RX ADMIN — CEFTRIAXONE 1000 MG: 1 INJECTION, POWDER, FOR SOLUTION INTRAMUSCULAR; INTRAVENOUS at 12:06

## 2025-07-27 ENCOUNTER — OFFICE VISIT (OUTPATIENT)
Age: 40
End: 2025-07-27

## 2025-07-27 VITALS
BODY MASS INDEX: 38.27 KG/M2 | DIASTOLIC BLOOD PRESSURE: 79 MMHG | OXYGEN SATURATION: 97 % | WEIGHT: 230 LBS | SYSTOLIC BLOOD PRESSURE: 123 MMHG | HEART RATE: 96 BPM

## 2025-07-27 DIAGNOSIS — H69.93 DYSFUNCTION OF BOTH EUSTACHIAN TUBES: ICD-10-CM

## 2025-07-27 DIAGNOSIS — J03.90 TONSILLITIS: Primary | ICD-10-CM

## 2025-07-27 RX ORDER — PREDNISONE 20 MG/1
20 TABLET ORAL DAILY
Qty: 5 TABLET | Refills: 0 | Status: SHIPPED | OUTPATIENT
Start: 2025-07-27 | End: 2025-08-01

## 2025-07-27 RX ORDER — DEXAMETHASONE SODIUM PHOSPHATE 10 MG/ML
10 INJECTION, SOLUTION INTRA-ARTICULAR; INTRALESIONAL; INTRAMUSCULAR; INTRAVENOUS; SOFT TISSUE ONCE
Status: COMPLETED | OUTPATIENT
Start: 2025-07-27 | End: 2025-07-27

## 2025-07-27 RX ADMIN — DEXAMETHASONE SODIUM PHOSPHATE 10 MG: 10 INJECTION, SOLUTION INTRA-ARTICULAR; INTRALESIONAL; INTRAMUSCULAR; INTRAVENOUS; SOFT TISSUE at 11:54

## (undated) DEVICE — PAD,ABDOMINAL,5"X9",ST,LF,25/BX: Brand: MEDLINE INDUSTRIES, INC.

## (undated) DEVICE — LOOP VES W25MM THK1MM MAXI RED SIL FLD REPELLENT 100 PER

## (undated) DEVICE — SOLUTION IRRIG 1500ML STRL H2O USP POUR PLAS BTL

## (undated) DEVICE — 3M™ WARMING BLANKET, UPPER BODY, 10 PER CASE, 42268: Brand: BAIR HUGGER™

## (undated) DEVICE — TOWEL,OR,DSP,ST,BLUE,DLX,XR,4/PK,20PK/CS: Brand: MEDLINE

## (undated) DEVICE — SKIN AFFIX SURG ADHESIVE 72/CS 0.55ML: Brand: MEDLINE

## (undated) DEVICE — LOOP VES W13MM THK09MM MINI RED SIL FLD REPELLENT

## (undated) DEVICE — GLOVE SURG SZ 65 L12IN FNGR THK87MIL WHT LTX FREE

## (undated) DEVICE — Device

## (undated) DEVICE — MEDI-VAC YANK SUCT HNDL W/TPRD BULBOUS TIP & NON-CONDUCTIVE: Brand: CARDINAL HEALTH

## (undated) DEVICE — SKIN PREP TRAY W/CHG: Brand: MEDLINE INDUSTRIES, INC.

## (undated) DEVICE — TOURNIQUET CUF BLD PRESSURE 4X18 IN 2 PRT SINGLE BLDR RED

## (undated) DEVICE — SUTURE VCRL SZ 4-0 L27IN ABSRB UD L19MM FS-2 3/8 CIR REV J422H

## (undated) DEVICE — SUTURE ABSORBABLE BRAIDED 2-0 CT-1 27 IN UD VICRYL J259H

## (undated) DEVICE — GLOVE SURG SZ 8 L12IN FNGR THK87MIL WHT LTX FREE

## (undated) DEVICE — PADDING UNDERCAST W4INXL4YD COT FBR LO LINTING WYTEX

## (undated) DEVICE — GOWN,SIRUS,NON REINFRCD,LARGE,SET IN SL: Brand: MEDLINE

## (undated) DEVICE — GLOVE SURG SZ 75 L12IN FNGR THK87MIL WHT LTX FREE

## (undated) DEVICE — STANDARD HYPODERMIC NEEDLE,POLYPROPYLENE HUB: Brand: MONOJECT

## (undated) DEVICE — STERILE LATEX POWDER-FREE SURGICAL GLOVESWITH NITRILE COATING: Brand: PROTEXIS

## (undated) DEVICE — GOWN,SIRUS,NONRNF,SETINSLV,XL,20/CS: Brand: MEDLINE

## (undated) DEVICE — SURGICAL PROCEDURE PACK C SECT B

## (undated) DEVICE — CHLORAPREP 26ML ORANGE

## (undated) DEVICE — CATHETERIZATION KIT PEDIATRIC 16 FR 5 CC INDWL INF CTRL

## (undated) DEVICE — SURGICAL PROCEDURE PACK C SECT ST CHARLES SCMH

## (undated) DEVICE — SUTURE VCRL SZ 0 L36IN ABSRB UD L36MM CT-1 1/2 CIR J946H

## (undated) DEVICE — Z DISCONTINUED NO SUB IDED SPLINT ORTH W5XL30IN LAYERED FBRGLS FOAM PD BRTH BK MOLD

## (undated) DEVICE — GLOVE SURG SZ 7 L12IN FNGR THK79MIL GRN LTX FREE